# Patient Record
Sex: FEMALE | Race: WHITE | NOT HISPANIC OR LATINO | Employment: OTHER | URBAN - METROPOLITAN AREA
[De-identification: names, ages, dates, MRNs, and addresses within clinical notes are randomized per-mention and may not be internally consistent; named-entity substitution may affect disease eponyms.]

---

## 2024-06-18 ENCOUNTER — TELEPHONE (OUTPATIENT)
Dept: FAMILY MEDICINE CLINIC | Facility: CLINIC | Age: 69
End: 2024-06-18
Payer: COMMERCIAL

## 2024-06-18 NOTE — TELEPHONE ENCOUNTER
RELAY:  Patient daughter called ofc requesting we run ins to verify it; Mary Alice in our office has gotten on [facility] Website; Ins Policy is active.  However, we will need a copy of the card @ check in to verify the correct policy and the patient will be responsible for making sure Carmen Odonnellgomery is in the patient's network.

## 2024-06-19 ENCOUNTER — APPOINTMENT (OUTPATIENT)
Dept: CT IMAGING | Facility: HOSPITAL | Age: 69
End: 2024-06-19
Payer: COMMERCIAL

## 2024-06-19 ENCOUNTER — APPOINTMENT (OUTPATIENT)
Dept: GENERAL RADIOLOGY | Facility: HOSPITAL | Age: 69
End: 2024-06-19
Payer: COMMERCIAL

## 2024-06-19 ENCOUNTER — OFFICE VISIT (OUTPATIENT)
Dept: FAMILY MEDICINE CLINIC | Facility: CLINIC | Age: 69
End: 2024-06-19
Payer: COMMERCIAL

## 2024-06-19 ENCOUNTER — HOSPITAL ENCOUNTER (INPATIENT)
Facility: HOSPITAL | Age: 69
LOS: 14 days | Discharge: HOME | End: 2024-07-03
Attending: EMERGENCY MEDICINE | Admitting: STUDENT IN AN ORGANIZED HEALTH CARE EDUCATION/TRAINING PROGRAM
Payer: COMMERCIAL

## 2024-06-19 VITALS
OXYGEN SATURATION: 99 % | BODY MASS INDEX: 55.56 KG/M2 | HEIGHT: 60 IN | HEART RATE: 87 BPM | DIASTOLIC BLOOD PRESSURE: 84 MMHG | WEIGHT: 283 LBS | SYSTOLIC BLOOD PRESSURE: 132 MMHG

## 2024-06-19 DIAGNOSIS — K92.1 BLACK STOOLS: ICD-10-CM

## 2024-06-19 DIAGNOSIS — R06.02 SHORTNESS OF BREATH: ICD-10-CM

## 2024-06-19 DIAGNOSIS — K14.8 TONGUE LESION: ICD-10-CM

## 2024-06-19 DIAGNOSIS — I82.5Y9 CHRONIC DEEP VEIN THROMBOSIS (DVT) OF PROXIMAL VEIN OF LOWER EXTREMITY, UNSPECIFIED LATERALITY: ICD-10-CM

## 2024-06-19 DIAGNOSIS — K29.70 GASTRITIS: ICD-10-CM

## 2024-06-19 DIAGNOSIS — D62 ACUTE BLOOD LOSS ANEMIA: Primary | ICD-10-CM

## 2024-06-19 DIAGNOSIS — N17.9 ACUTE RENAL FAILURE, UNSPECIFIED ACUTE RENAL FAILURE TYPE: ICD-10-CM

## 2024-06-19 DIAGNOSIS — Z76.89 ENCOUNTER TO ESTABLISH CARE: Primary | ICD-10-CM

## 2024-06-19 DIAGNOSIS — E87.5 ACUTE HYPERKALEMIA: ICD-10-CM

## 2024-06-19 DIAGNOSIS — K92.2 ACUTE GI BLEEDING: ICD-10-CM

## 2024-06-19 PROBLEM — I10 PRIMARY HYPERTENSION: Status: ACTIVE | Noted: 2024-06-19

## 2024-06-19 PROBLEM — I83.009 CHRONIC CUTANEOUS VENOUS STASIS ULCER: Status: ACTIVE | Noted: 2024-06-19

## 2024-06-19 PROBLEM — L97.909 CHRONIC CUTANEOUS VENOUS STASIS ULCER: Status: ACTIVE | Noted: 2024-06-19

## 2024-06-19 PROBLEM — N19 RENAL FAILURE: Status: ACTIVE | Noted: 2024-06-19

## 2024-06-19 PROBLEM — I89.0 CHRONIC ACQUIRED LYMPHEDEMA: Status: ACTIVE | Noted: 2024-06-19

## 2024-06-19 PROBLEM — I82.509 CHRONIC DEEP VEIN THROMBOSIS (DVT): Status: ACTIVE | Noted: 2024-06-19

## 2024-06-19 LAB
ABO GROUP BLD: NORMAL
ABO GROUP BLD: NORMAL
ALBUMIN SERPL-MCNC: 3.2 G/DL (ref 3.5–5.2)
ALBUMIN/GLOB SERPL: 0.8 G/DL
ALP SERPL-CCNC: 59 U/L (ref 39–117)
ALT SERPL W P-5'-P-CCNC: 22 U/L (ref 1–33)
ANION GAP SERPL CALCULATED.3IONS-SCNC: 14 MMOL/L (ref 5–15)
AST SERPL-CCNC: 30 U/L (ref 1–32)
BASOPHILS # BLD AUTO: 0.03 10*3/MM3 (ref 0–0.2)
BASOPHILS NFR BLD AUTO: 0.2 % (ref 0–1.5)
BILIRUB SERPL-MCNC: 0.3 MG/DL (ref 0–1.2)
BLD GP AB SCN SERPL QL: NEGATIVE
BUN SERPL-MCNC: 88 MG/DL (ref 8–23)
BUN/CREAT SERPL: 13 (ref 7–25)
CALCIUM SPEC-SCNC: 9.5 MG/DL (ref 8.6–10.5)
CHLORIDE SERPL-SCNC: 103 MMOL/L (ref 98–107)
CO2 SERPL-SCNC: 17 MMOL/L (ref 22–29)
CREAT SERPL-MCNC: 6.77 MG/DL (ref 0.57–1)
DEPRECATED RDW RBC AUTO: 59.2 FL (ref 37–54)
DEVELOPER EXPIRATION DATE: ABNORMAL
DEVELOPER LOT NUMBER: ABNORMAL
EGFRCR SERPLBLD CKD-EPI 2021: 6.2 ML/MIN/1.73
EOSINOPHIL # BLD AUTO: 0.22 10*3/MM3 (ref 0–0.4)
EOSINOPHIL NFR BLD AUTO: 1.8 % (ref 0.3–6.2)
ERYTHROCYTE [DISTWIDTH] IN BLOOD BY AUTOMATED COUNT: 16.5 % (ref 12.3–15.4)
EXPIRATION DATE: ABNORMAL
FECAL OCCULT BLOOD SCREEN, POC: POSITIVE
GLOBULIN UR ELPH-MCNC: 4.1 GM/DL
GLUCOSE BLDC GLUCOMTR-MCNC: 113 MG/DL (ref 70–130)
GLUCOSE BLDC GLUCOMTR-MCNC: 116 MG/DL (ref 70–130)
GLUCOSE BLDC GLUCOMTR-MCNC: 117 MG/DL (ref 70–130)
GLUCOSE BLDC GLUCOMTR-MCNC: 128 MG/DL (ref 70–130)
GLUCOSE BLDC GLUCOMTR-MCNC: 188 MG/DL (ref 70–130)
GLUCOSE SERPL-MCNC: 125 MG/DL (ref 65–99)
HCT VFR BLD AUTO: 18.9 % (ref 34–46.6)
HGB BLD-MCNC: 5.8 G/DL (ref 12–15.9)
HOLD SPECIMEN: NORMAL
IMM GRANULOCYTES # BLD AUTO: 0.12 10*3/MM3 (ref 0–0.05)
IMM GRANULOCYTES NFR BLD AUTO: 1 % (ref 0–0.5)
INR PPP: >10 (ref 0.89–1.12)
LYMPHOCYTES # BLD AUTO: 1.81 10*3/MM3 (ref 0.7–3.1)
LYMPHOCYTES NFR BLD AUTO: 15.1 % (ref 19.6–45.3)
Lab: ABNORMAL
MCH RBC QN AUTO: 30.7 PG (ref 26.6–33)
MCHC RBC AUTO-ENTMCNC: 30.7 G/DL (ref 31.5–35.7)
MCV RBC AUTO: 100 FL (ref 79–97)
MONOCYTES # BLD AUTO: 0.7 10*3/MM3 (ref 0.1–0.9)
MONOCYTES NFR BLD AUTO: 5.8 % (ref 5–12)
NEGATIVE CONTROL: NEGATIVE
NEUTROPHILS NFR BLD AUTO: 76.1 % (ref 42.7–76)
NEUTROPHILS NFR BLD AUTO: 9.14 10*3/MM3 (ref 1.7–7)
NRBC BLD AUTO-RTO: 0 /100 WBC (ref 0–0.2)
NT-PROBNP SERPL-MCNC: 3437 PG/ML (ref 0–900)
PLATELET # BLD AUTO: 232 10*3/MM3 (ref 140–450)
PMV BLD AUTO: 11.3 FL (ref 6–12)
POSITIVE CONTROL: POSITIVE
POTASSIUM SERPL-SCNC: 6 MMOL/L (ref 3.5–5.2)
PROT SERPL-MCNC: 7.3 G/DL (ref 6–8.5)
PROTHROMBIN TIME: 82.1 SECONDS (ref 12.2–14.5)
RBC # BLD AUTO: 1.89 10*6/MM3 (ref 3.77–5.28)
RH BLD: POSITIVE
RH BLD: POSITIVE
SODIUM SERPL-SCNC: 134 MMOL/L (ref 136–145)
T&S EXPIRATION DATE: NORMAL
TROPONIN T SERPL HS-MCNC: 26 NG/L
WBC NRBC COR # BLD AUTO: 12.02 10*3/MM3 (ref 3.4–10.8)
WHOLE BLOOD HOLD COAG: NORMAL
WHOLE BLOOD HOLD SPECIMEN: NORMAL

## 2024-06-19 PROCEDURE — 71045 X-RAY EXAM CHEST 1 VIEW: CPT

## 2024-06-19 PROCEDURE — 86900 BLOOD TYPING SEROLOGIC ABO: CPT

## 2024-06-19 PROCEDURE — 74176 CT ABD & PELVIS W/O CONTRAST: CPT

## 2024-06-19 PROCEDURE — 86923 COMPATIBILITY TEST ELECTRIC: CPT

## 2024-06-19 PROCEDURE — 93005 ELECTROCARDIOGRAM TRACING: CPT

## 2024-06-19 PROCEDURE — 99204 OFFICE O/P NEW MOD 45 MIN: CPT | Performed by: PHYSICIAN ASSISTANT

## 2024-06-19 PROCEDURE — 82270 OCCULT BLOOD FECES: CPT | Performed by: EMERGENCY MEDICINE

## 2024-06-19 PROCEDURE — P9016 RBC LEUKOCYTES REDUCED: HCPCS

## 2024-06-19 PROCEDURE — 99418 PROLNG IP/OBS E/M EA 15 MIN: CPT | Performed by: INTERNAL MEDICINE

## 2024-06-19 PROCEDURE — 36415 COLL VENOUS BLD VENIPUNCTURE: CPT

## 2024-06-19 PROCEDURE — 85610 PROTHROMBIN TIME: CPT | Performed by: NURSE PRACTITIONER

## 2024-06-19 PROCEDURE — 36430 TRANSFUSION BLD/BLD COMPNT: CPT

## 2024-06-19 PROCEDURE — 83880 ASSAY OF NATRIURETIC PEPTIDE: CPT | Performed by: EMERGENCY MEDICINE

## 2024-06-19 PROCEDURE — 86901 BLOOD TYPING SEROLOGIC RH(D): CPT | Performed by: EMERGENCY MEDICINE

## 2024-06-19 PROCEDURE — 86900 BLOOD TYPING SEROLOGIC ABO: CPT | Performed by: EMERGENCY MEDICINE

## 2024-06-19 PROCEDURE — 86901 BLOOD TYPING SEROLOGIC RH(D): CPT

## 2024-06-19 PROCEDURE — 99291 CRITICAL CARE FIRST HOUR: CPT

## 2024-06-19 PROCEDURE — 93005 ELECTROCARDIOGRAM TRACING: CPT | Performed by: EMERGENCY MEDICINE

## 2024-06-19 PROCEDURE — 99223 1ST HOSP IP/OBS HIGH 75: CPT | Performed by: INTERNAL MEDICINE

## 2024-06-19 PROCEDURE — 85025 COMPLETE CBC W/AUTO DIFF WBC: CPT | Performed by: EMERGENCY MEDICINE

## 2024-06-19 PROCEDURE — 25010000002 CALCIUM GLUCONATE-NACL 1-0.675 GM/50ML-% SOLUTION: Performed by: EMERGENCY MEDICINE

## 2024-06-19 PROCEDURE — 83036 HEMOGLOBIN GLYCOSYLATED A1C: CPT | Performed by: NURSE PRACTITIONER

## 2024-06-19 PROCEDURE — 84484 ASSAY OF TROPONIN QUANT: CPT | Performed by: EMERGENCY MEDICINE

## 2024-06-19 PROCEDURE — 82948 REAGENT STRIP/BLOOD GLUCOSE: CPT

## 2024-06-19 PROCEDURE — 0 DEXTROSE 5 % SOLUTION 1,000 ML FLEX CONT: Performed by: EMERGENCY MEDICINE

## 2024-06-19 PROCEDURE — 63710000001 INSULIN REGULAR HUMAN PER 5 UNITS: Performed by: EMERGENCY MEDICINE

## 2024-06-19 PROCEDURE — 86850 RBC ANTIBODY SCREEN: CPT | Performed by: EMERGENCY MEDICINE

## 2024-06-19 PROCEDURE — 80053 COMPREHEN METABOLIC PANEL: CPT | Performed by: EMERGENCY MEDICINE

## 2024-06-19 RX ORDER — SODIUM CHLORIDE 9 MG/ML
40 INJECTION, SOLUTION INTRAVENOUS AS NEEDED
Status: DISCONTINUED | OUTPATIENT
Start: 2024-06-19 | End: 2024-07-03 | Stop reason: HOSPADM

## 2024-06-19 RX ORDER — BISACODYL 5 MG/1
5 TABLET, DELAYED RELEASE ORAL DAILY PRN
Status: DISCONTINUED | OUTPATIENT
Start: 2024-06-19 | End: 2024-07-03 | Stop reason: HOSPADM

## 2024-06-19 RX ORDER — PANTOPRAZOLE SODIUM 40 MG/10ML
40 INJECTION, POWDER, LYOPHILIZED, FOR SOLUTION INTRAVENOUS
Status: DISCONTINUED | OUTPATIENT
Start: 2024-06-20 | End: 2024-06-20

## 2024-06-19 RX ORDER — LAMOTRIGINE 25 MG/1
500 TABLET ORAL
COMMUNITY

## 2024-06-19 RX ORDER — ROSUVASTATIN CALCIUM 10 MG/1
10 TABLET, COATED ORAL NIGHTLY
COMMUNITY

## 2024-06-19 RX ORDER — TERAZOSIN 2 MG/1
2 CAPSULE ORAL NIGHTLY
Status: DISCONTINUED | OUTPATIENT
Start: 2024-06-19 | End: 2024-07-03 | Stop reason: HOSPADM

## 2024-06-19 RX ORDER — SODIUM CHLORIDE 0.9 % (FLUSH) 0.9 %
10 SYRINGE (ML) INJECTION AS NEEDED
Status: DISCONTINUED | OUTPATIENT
Start: 2024-06-19 | End: 2024-07-03 | Stop reason: HOSPADM

## 2024-06-19 RX ORDER — PANTOPRAZOLE SODIUM 40 MG/10ML
80 INJECTION, POWDER, LYOPHILIZED, FOR SOLUTION INTRAVENOUS ONCE
Status: COMPLETED | OUTPATIENT
Start: 2024-06-19 | End: 2024-06-19

## 2024-06-19 RX ORDER — NEBIVOLOL 2.5 MG/1
2.5 TABLET ORAL
Status: DISCONTINUED | OUTPATIENT
Start: 2024-06-20 | End: 2024-07-03 | Stop reason: HOSPADM

## 2024-06-19 RX ORDER — HEPARIN SODIUM 5000 [USP'U]/ML
5000 INJECTION, SOLUTION INTRAVENOUS; SUBCUTANEOUS EVERY 12 HOURS SCHEDULED
Status: DISCONTINUED | OUTPATIENT
Start: 2024-06-19 | End: 2024-06-19

## 2024-06-19 RX ORDER — SODIUM CHLORIDE 0.9 % (FLUSH) 0.9 %
10 SYRINGE (ML) INJECTION EVERY 12 HOURS SCHEDULED
Status: DISCONTINUED | OUTPATIENT
Start: 2024-06-19 | End: 2024-07-03 | Stop reason: HOSPADM

## 2024-06-19 RX ORDER — DABIGATRAN ETEXILATE 150 MG/1
150 CAPSULE ORAL 2 TIMES DAILY
Status: ON HOLD | COMMUNITY

## 2024-06-19 RX ORDER — DEXTROSE MONOHYDRATE 50 MG/ML
100 INJECTION, SOLUTION INTRAVENOUS CONTINUOUS
Status: DISCONTINUED | OUTPATIENT
Start: 2024-06-19 | End: 2024-06-19 | Stop reason: CLARIF

## 2024-06-19 RX ORDER — AMLODIPINE BESYLATE 2.5 MG/1
2.5 TABLET ORAL
Status: DISCONTINUED | OUTPATIENT
Start: 2024-06-20 | End: 2024-06-22

## 2024-06-19 RX ORDER — POLYETHYLENE GLYCOL 3350 17 G/17G
17 POWDER, FOR SOLUTION ORAL DAILY PRN
Status: DISCONTINUED | OUTPATIENT
Start: 2024-06-19 | End: 2024-07-03 | Stop reason: HOSPADM

## 2024-06-19 RX ORDER — AMOXICILLIN 250 MG
2 CAPSULE ORAL 2 TIMES DAILY PRN
Status: DISCONTINUED | OUTPATIENT
Start: 2024-06-19 | End: 2024-07-03 | Stop reason: HOSPADM

## 2024-06-19 RX ORDER — BISACODYL 10 MG
10 SUPPOSITORY, RECTAL RECTAL DAILY PRN
Status: DISCONTINUED | OUTPATIENT
Start: 2024-06-19 | End: 2024-07-03 | Stop reason: HOSPADM

## 2024-06-19 RX ORDER — CALCIUM GLUCONATE 20 MG/ML
1000 INJECTION, SOLUTION INTRAVENOUS ONCE
Status: COMPLETED | OUTPATIENT
Start: 2024-06-19 | End: 2024-06-19

## 2024-06-19 RX ORDER — DEXTROSE MONOHYDRATE 25 G/50ML
25 INJECTION, SOLUTION INTRAVENOUS ONCE
Status: COMPLETED | OUTPATIENT
Start: 2024-06-19 | End: 2024-06-19

## 2024-06-19 RX ADMIN — Medication 10 ML: at 21:34

## 2024-06-19 RX ADMIN — SODIUM BICARBONATE 150 MEQ: 84 INJECTION, SOLUTION INTRAVENOUS at 16:30

## 2024-06-19 RX ADMIN — PANTOPRAZOLE SODIUM 80 MG: 40 INJECTION, POWDER, FOR SOLUTION INTRAVENOUS at 16:03

## 2024-06-19 RX ADMIN — SODIUM BICARBONATE 50 MEQ: 84 INJECTION INTRAVENOUS at 16:03

## 2024-06-19 RX ADMIN — CALCIUM GLUCONATE 1000 MG: 20 INJECTION, SOLUTION INTRAVENOUS at 16:03

## 2024-06-19 RX ADMIN — INSULIN HUMAN 5 UNITS: 100 INJECTION, SOLUTION PARENTERAL at 15:47

## 2024-06-19 RX ADMIN — TERAZOSIN HYDROCHLORIDE 2 MG: 2 CAPSULE ORAL at 21:34

## 2024-06-19 RX ADMIN — SODIUM ZIRCONIUM CYCLOSILICATE 10 G: 10 POWDER, FOR SUSPENSION ORAL at 15:47

## 2024-06-19 RX ADMIN — DEXTROSE MONOHYDRATE 25 G: 25 INJECTION, SOLUTION INTRAVENOUS at 15:47

## 2024-06-19 NOTE — ED NOTES
Moon Lerma    Nursing Report ED to Floor:  Mental status: a&ox4  Ambulatory status: non ambulatory at this time  Oxygen Therapy:  room air   Cardiac Rhythm: NSR  Admitted from: home/ed  Safety Concerns:  fall risk  Social Issues: non-english speaking; speaks Faroese  ED Room #:  17    ED Nurse Phone Extension - [phone] or may call [phone].      HPI:   Chief Complaint   Patient presents with    Shortness of Breath    Black or Bloody Stool       Past Medical History:  Past Medical History:   Diagnosis Date    Breast cancer     DVT (deep venous thrombosis)     Hypertension         Past Surgical History:  Past Surgical History:   Procedure Laterality Date    MASTECTOMY, PARTIAL      left breast        Admitting Doctor:   James Zarate DO    Consulting Provider(s):  Consults       No orders found from 5/21/2024 to 6/20/2024.             Admitting Diagnosis:   The primary encounter diagnosis was Acute blood loss anemia. Diagnoses of Acute renal failure, unspecified acute renal failure type, Acute hyperkalemia, and Acute GI bleeding were also pertinent to this visit.    Most Recent Vitals:   Vitals:    06/19/24 1511 06/19/24 1530 06/19/24 1544 06/19/24 1559   BP:  145/84 145/84 146/87   BP Location:       Patient Position:       Pulse: 78 78 80 80   Resp:   20 20   Temp:   98.2 °F (36.8 °C) 98 °F (36.7 °C)   TempSrc:       SpO2: 98% 96% 97% 99%   Weight:       Height:           Active LDAs/IV Access:   Lines, Drains & Airways       Active LDAs       Name Placement date Placement time Site Days    Peripheral IV 06/19/24 1427 Left Antecubital 06/19/24  1427  Antecubital  less than 1    Peripheral IV 06/19/24 1540 Left Antecubital 06/19/24  1540  Antecubital  less than 1                    Labs (abnormal labs have a star):   Labs Reviewed   COMPREHENSIVE METABOLIC PANEL - Abnormal; Notable for the following components:       Result Value    Glucose 125 (*)     BUN 88 (*)     Creatinine 6.77 (*)     Sodium 134 (*)      Potassium 6.0 (*)     CO2 17.0 (*)     Albumin 3.2 (*)     eGFR 6.2 (*)     All other components within normal limits    Narrative:     GFR Normal >60  Chronic Kidney Disease <60  Kidney Failure <15     BNP (IN-HOUSE) - Abnormal; Notable for the following components:    proBNP 3,437.0 (*)     All other components within normal limits    Narrative:     This assay is used as an aid in the diagnosis of individuals suspected of having heart failure. It can be used as an aid in the diagnosis of acute decompensated heart failure (ADHF) in patients presenting with signs and symptoms of ADHF to the emergency department (ED). In addition, NT-proBNP of <300 pg/mL indicates ADHF is not likely.    Age Range Result Interpretation  NT-proBNP Concentration (pg/mL:      <50             Positive            >450                   Gray                 300-450                    Negative             <300    50-75           Positive            >900                  Gray                300-900                  Negative            <300      >75             Positive            >1800                  Gray                300-1800                  Negative            <300   SINGLE HS TROPONIN T - Abnormal; Notable for the following components:    HS Troponin T 26 (*)     All other components within normal limits    Narrative:     High Sensitive Troponin T Reference Range:  <14.0 ng/L- Negative Female for AMI  <22.0 ng/L- Negative Male for AMI  >=14 - Abnormal Female indicating possible myocardial injury.  >=22 - Abnormal Male indicating possible myocardial injury.   Clinicians would have to utilize clinical acumen, EKG, Troponin, and serial changes to determine if it is an Acute Myocardial Infarction or myocardial injury due to an underlying chronic condition.        CBC WITH AUTO DIFFERENTIAL - Abnormal; Notable for the following components:    WBC 12.02 (*)     RBC 1.89 (*)     Hemoglobin 5.8 (*)     Hematocrit 18.9 (*)     .0 (*)      MCHC 30.7 (*)     RDW 16.5 (*)     RDW-SD 59.2 (*)     Neutrophil % 76.1 (*)     Lymphocyte % 15.1 (*)     Immature Grans % 1.0 (*)     Neutrophils, Absolute 9.14 (*)     Immature Grans, Absolute 0.12 (*)     All other components within normal limits   POCT OCCULT BLOOD STOOL - Abnormal; Notable for the following components:    Fecal Occult Blood Positive (*)     All other components within normal limits   POCT GLUCOSE FINGERSTICK - Abnormal; Notable for the following components:    Glucose 188 (*)     All other components within normal limits   POCT GLUCOSE FINGERSTICK - Normal   RAINBOW DRAW    Narrative:     The following orders were created for panel order [place] Draw.  Procedure                               Abnormality         Status                     ---------                               -----------         ------                     Green Top (Gel)[[id number]]                                  Final result               Lavender Top[[id number]]                                     Final result               Gold Top - SST[[id number]]                                   Final result               Ackerman Top[[id number]]                                         Final result               Light Blue Top[[id number]]                                   Final result                 Please view results for these tests on the individual orders.   POCT GLUCOSE FINGERSTICK   POCT GLUCOSE FINGERSTICK   POCT GLUCOSE FINGERSTICK   POCT GLUCOSE FINGERSTICK   TYPE AND SCREEN   PREPARE RBC   ABORH 2ND SPECIMEN VERIFICATION   CBC AND DIFFERENTIAL    Narrative:     The following orders were created for panel order CBC & Differential.  Procedure                               Abnormality         Status                     ---------                               -----------         ------                     CBC Auto Differential[[id number]]        Abnormal            Final result                 Please view results for these tests on  the individual orders.   GREEN TOP   LAVENDER TOP   GOLD TOP - SST   GRAY TOP   LIGHT BLUE TOP       Meds Given in ED:   Medications   sodium chloride 0.9 % flush 10 mL (has no administration in time range)   calcium gluconate 1000 Mg/50ml 0.675% NaCl IV SOLN (1,000 mg Intravenous New Bag 6/19/24 1603)   sodium bicarbonate 8.4 % 150 mEq in dextrose (D5W) 5 % 1,000 mL infusion (greater than 100 mEq) (has no administration in time range)   insulin regular (humuLIN R,novoLIN R) injection 5 Units (5 Units Intravenous Given 6/19/24 1547)   dextrose (D50W) (25 g/50 mL) IV injection 25 g (25 g Intravenous Given 6/19/24 1547)   sodium zirconium cyclosilicate (LOKELMA) packet 10 g (10 g Oral Given 6/19/24 1547)   pantoprazole (PROTONIX) injection 80 mg (80 mg Intravenous Given 6/19/24 1603)   sodium bicarbonate injection 8.4% 50 mEq (50 mEq Intravenous Given 6/19/24 1603)     sodium bicarbonate 8.4 % 150 mEq in dextrose (D5W) 5 % 1,000 mL infusion (greater than 100 mEq), 150 mEq         Last NIH score:                                                          Dysphagia screening results:        Johnnie Coma Scale:  No data recorded     CIWA:        Restraint Type:            Isolation Status:  No active isolations

## 2024-06-19 NOTE — PROGRESS NOTES
"Chief Complaint   Patient presents with    [facility]    Shortness of Breath       Moon Lerma is a 68 y.o. female who presents to [facility].  Patient is visiting her daughter from [place].  She arrived last Wednesday.  Was on an antibiotic course for 2 weeks prior to flying to [place].  She has known chronic DVT in her leg and is on Pradaxa.  She reports new onset shortness of breath and is concerned for blood clot.  She reports dark tarry stools for the last few days.  No abdominal pain.  Has episodic nasal bleeding, none today.  Daughter is present and is interpreting for patient.    Chief Complaint   Patient presents with    [facility]    Shortness of Breath       Past Medical History:   Diagnosis Date    Breast cancer     DVT (deep venous thrombosis)     Hypertension        Past Surgical History:   Procedure Laterality Date    MASTECTOMY, PARTIAL      left breast       History reviewed. No pertinent family history.    Social History     Socioeconomic History    Marital status:    Tobacco Use    Smoking status: Never    Smokeless tobacco: Never   Vaping Use    Vaping status: Never Used   Substance and Sexual Activity    Alcohol use: Not Currently    Drug use: Never    Sexual activity: Defer       No Known Allergies    ROS    Review of Systems   Constitutional:  Positive for fatigue. Negative for chills and fever.   Respiratory:  Positive for shortness of breath. Negative for cough and wheezing.    Cardiovascular:  Positive for leg swelling. Negative for chest pain and palpitations.   Gastrointestinal:  Negative for abdominal pain.        Dark black stools   Neurological:  Negative for dizziness and headache.       Vitals:    06/19/24 1142   BP: 132/84   BP Location: Left arm   Patient Position: Sitting   Cuff Size: Adult   Pulse: 87   SpO2: 99%   Weight: 128 kg (283 lb)   Height: 152.4 cm (60\")     Body mass index is 55.27 kg/m².        Current Outpatient Medications on File Prior to Visit "   Medication Sig Dispense Refill    ANASTROZOLE PO Take  by mouth.      dabigatran etexilate (PRADAXA) 150 MG capsu Take 1 capsule by mouth 2 (Two) Times a Day.      NON FORMULARY MEBEVERINE      NON FORMULARY PROBOL NT      PRAZOSIN HCL PO Take  by mouth.       No current facility-administered medications on file prior to visit.       No results found for this or any previous visit.    PE  Physical Exam  Vitals reviewed.   Constitutional:       General: She is not in acute distress.     Appearance: Normal appearance. She is well-developed. She is not ill-appearing or diaphoretic.   HENT:      Head: Normocephalic and atraumatic.      Mouth/Throat:     Eyes:      Extraocular Movements: Extraocular movements intact.      Conjunctiva/sclera: Conjunctivae normal.   Cardiovascular:      Rate and Rhythm: Normal rate and regular rhythm.      Heart sounds: Heart sounds are distant.   Pulmonary:      Effort: Pulmonary effort is normal. No respiratory distress.      Breath sounds: Decreased air movement present. Decreased breath sounds present. No wheezing, rhonchi or rales.   Musculoskeletal:         General: Normal range of motion.      Cervical back: Normal range of motion.      Right lower leg: Edema present.      Left lower leg: Edema present.      Comments: Patient has swelling in both legs.  Lymphedema.   Neurological:      General: No focal deficit present.      Mental Status: She is alert.   Psychiatric:         Attention and Perception: She is attentive.         Speech: Speech normal.         Behavior: Behavior is cooperative.         A/P    Diagnoses and all orders for this visit:    1. Encounter to establish care (Primary)    2. Shortness of breath  -     Comprehensive Metabolic Panel; Future  -     XR Chest PA & Lateral; Future    3. Black stools  -     CBC Auto Differential; Future    4. Chronic deep vein thrombosis (DVT) of proximal vein of lower extremity, unspecified laterality  On Pradaxa.    5. Tongue  lesion  Time, orajel.  If symptoms persist ENT.        New patient.  Just arrived in [place] with multiple concerns.  Recent travel a week ago on plane.  Because of the nature of her concerns, I recommend she go to the ER.  With history of DVT, new onset shortness of breath and dark tarry stools, I recommend further work-up in ER to assess situation in a quick manner.  VSS.  Pulse ox is 99%.  Unable to order d-dimer with known DVT.  Already anticoagulated.    Will order chest x-ray to evaluate lungs for possible pneumonia.  Will order CBC and FIT test.  She had Covid test yesterday at home and was negative.  Again, reiterated to patient that she should go to ER.       Plan of care reviewed with patient at the conclusion of today's visit. Education was provided regarding diagnosis, management and any prescribed or recommended OTC medications.  Patient verbalizes understanding of and agreement with management plan.    Dictated Utilizing Dragon Dictation     Please note that portions of this note were completed with a voice recognition program.     Part of this note may be an electronic transcription/translation of spoken language to printed text using the Dragon Dictation System.      No follow-ups on file.     Carmen Singh PA-C

## 2024-06-19 NOTE — ED PROVIDER NOTES
Subjective   History of Present Illness  68-year-old female who presents for evaluation of tarry stool.  The daughter, is the primary historian.  She reports the patient has had 3 to 4 days of tarry appearing stool.  She has a previous history of DVT and pulmonary embolism diagnosed 2017 and therefore takes Pradaxa.  Does not any medication.  No previous history of GI bleed in the past.  She denies any abdominal pain.  Maybe mild fatigue but no significant lightheadedness.  She exhibits normal mentation and appears in no acute respiratory distress and has normal vital signs.  She last took her Pradaxa this morning.  Of note the patient also has an underlying history of breast cancer.  This has been worked up thoroughly in [place].  She is currently in [place] because she is visiting relatives.  She last had a PET scan that did not show any definitive metastasis and lab evaluation in mid April.  Her creatinine in mid April was 1.4.  Her hemoglobin at that given time was 9.8.  She denies fever, body aches, or chills.  No dysuria.  No other acute complaints.      Review of Systems   Constitutional:  Positive for activity change, appetite change and fatigue. Negative for chills and fever.   HENT:  Negative for congestion, ear pain, postnasal drip, sinus pressure and sore throat.    Eyes:  Negative for pain, redness and visual disturbance.   Respiratory:  Negative for cough, chest tightness and shortness of breath.    Cardiovascular:  Negative for chest pain, palpitations and leg swelling.   Gastrointestinal:  Positive for blood in stool. Negative for abdominal pain, anal bleeding, diarrhea, nausea and vomiting.   Endocrine: Negative for polydipsia and polyuria.   Genitourinary:  Negative for difficulty urinating, dysuria, frequency and urgency.   Musculoskeletal:  Negative for arthralgias, back pain and neck pain.   Skin:  Negative for pallor and rash.   Allergic/Immunologic: Negative for environmental allergies and  immunocompromised state.   Neurological:  Negative for dizziness, weakness and headaches.   Hematological:  Negative for adenopathy.   Psychiatric/Behavioral:  Negative for confusion, self-injury and suicidal ideas. The patient is not nervous/anxious.    All other systems reviewed and are negative.      Past Medical History:   Diagnosis Date    Breast cancer     Clotting disorder     DVT (deep venous thrombosis)     Hypertension        No Known Allergies    Past Surgical History:   Procedure Laterality Date    MASTECTOMY, PARTIAL      left breast       Family History   Problem Relation Age of Onset    No Known Problems Mother     Diabetes Father        Social History     Socioeconomic History    Marital status:    Tobacco Use    Smoking status: Never    Smokeless tobacco: Never   Vaping Use    Vaping status: Never Used   Substance and Sexual Activity    Alcohol use: Not Currently    Drug use: Never    Sexual activity: Defer           Objective   Physical Exam  Vitals and nursing note reviewed.   Constitutional:       General: She is not in acute distress.     Appearance: Normal appearance. She is well-developed. She is not toxic-appearing or diaphoretic.   HENT:      Head: Normocephalic and atraumatic.      Right Ear: External ear normal.      Left Ear: External ear normal.      Nose: Nose normal.   Eyes:      General: Lids are normal.      Pupils: Pupils are equal, round, and reactive to light.   Neck:      Trachea: No tracheal deviation.   Cardiovascular:      Rate and Rhythm: Normal rate and regular rhythm.      Pulses: No decreased pulses.      Heart sounds: Normal heart sounds. No murmur heard.     No friction rub. No gallop.   Pulmonary:      Effort: Pulmonary effort is normal. No respiratory distress.      Breath sounds: Normal breath sounds. No decreased breath sounds, wheezing, rhonchi or rales.   Abdominal:      General: Bowel sounds are normal.      Palpations: Abdomen is soft.      Tenderness:  There is no abdominal tenderness. There is no guarding or rebound.      Comments: Abdomen is soft and nontender diffusely.    Identified on guaiac testing of the stool.   Genitourinary:     Rectum: Guaiac result positive.   Musculoskeletal:         General: No deformity. Normal range of motion.      Cervical back: Normal range of motion and neck supple.   Lymphadenopathy:      Cervical: No cervical adenopathy.   Skin:     General: Skin is warm and dry.      Findings: No rash.   Neurological:      Mental Status: She is alert and oriented to person, place, and time.      Cranial Nerves: No cranial nerve deficit.      Sensory: No sensory deficit.   Psychiatric:         Speech: Speech normal.         Behavior: Behavior normal.         Thought Content: Thought content normal.         Judgment: Judgment normal.         Critical Care    Performed by: Emerald Bravo MD  Authorized by: Emerald Bravo MD    Critical care provider statement:     Critical care time (minutes):  45    Critical care time was exclusive of:  Separately billable procedures and treating other patients    Critical care was necessary to treat or prevent imminent or life-threatening deterioration of the following conditions:  Dehydration and renal failure    Critical care was time spent personally by me on the following activities:  Development of treatment plan with patient or surrogate, discussions with consultants, evaluation of patient's response to treatment, examination of patient, obtaining history from patient or surrogate, ordering and performing treatments and interventions, ordering and review of laboratory studies, ordering and review of radiographic studies, pulse oximetry, re-evaluation of patient's condition and review of old charts    I assumed direction of critical care for this patient from another provider in my specialty: no      Care discussed with: admitting provider               ED Course  ED Course as of 06/19/24  1720   Wed Jun 19, 2024   1550 This patient is currently visiting family from [place].  Arrived here in [place] roughly 1 week ago.  She has a known history of breast cancer being managed in [place].  Her most recent PET scan in April did not show definitive metastasis.  She also had labs most recently performed in April in [place] prior to coming here to [facility].  At that given time she had a hemoglobin 9.8 and a creatinine to 1.4.  She began to have melena 3 to 4 days ago.  She however is hemodynamically stable with normal heart rate and normal blood pressure.  Her hemoglobin is 5.8.  Her current creatinine is 6.7 with associated hyperkalemia to 6.0.  These are all acute changes.  She appears well nontoxic and in no acute distress.  CT scan of the abdomen pelvis without contrast is nonrevealing.  She is positive for melena with no active or severe bleeding.  I have ordered type and screen, 2 units of packed red blood cells,and protonix.  The patient does take Pradaxa due to a previous history of DVT and this has been held but not reversed at this given time due to hemodynamic stability.  I discussed the patient with Dr. Mazariegos who will consult with no further recommendations at this time.  I discussed the patient with the nephrologist Dr. Deutsch who recommends D5W with 150 mEq of bicarb at 100 mL/hr with Lokelma and appropriate potassium shifting medications.  I also ordered a bolus of 50 mequivalents of bicarb. [NS]      ED Course User Index  [NS] Emerald Bravo MD                                             Medical Decision Making  Differential diagnosis includes acute GI bleed, anemia, renal insufficiency, electrolyte abnormality, acute infectious process.    Chest x-ray and CT scan of abdomen pelvis without contrast shows no acute abnormalities.    Lab evaluation shows acute blood loss anemia with a hemoglobin of 5.8.  As stated previous comparison from 2 months ago was 9.8.  The patient has had a  type and screen ordered and 2 units of packed red blood cells ordered.    Labs also show a creatinine of 6.77 with a previous creatinine of 1.42 months ago.  There is also associated hyperkalemia to 6.0.    EKG independently interpreted myself shows sinus rhythm without acute ischemic changes.    As stated the Hemoccult stool test is positive.    The patient is hemodynamically stable therefore Pradaxa will not be reversed at this given time.    I discussed the GI bleed Dr. Mazariegos the GI service who is agreeable with IV fluids, Protonix, and packed red blood cells.  No reversal deemed necessary at this time.    I discussed the patient's acute renal sufficiency with the nephrologist, Dr. Deutsch, who recommends D5W with 150 mill equivalents of bicarb at 100 mL/h.  Appropriate hyperkalemic medications correction initaited include Lokelma, insulin, dextrose, and calcium gluconate.  In addition to bicarb and the drip a amp of bolus bicarb was also ordered.    I discussed the patient with the hospitalist, Dr. Zarate, who will consult on the patient to determine status of admission.    Problems Addressed:  Acute blood loss anemia: complicated acute illness or injury with systemic symptoms  Acute GI bleeding: complicated acute illness or injury with systemic symptoms  Acute hyperkalemia: complicated acute illness or injury with systemic symptoms  Acute renal failure, unspecified acute renal failure type: complicated acute illness or injury with systemic symptoms    Amount and/or Complexity of Data Reviewed  Independent Historian: friend     Details: Daughter provides additional history.  External Data Reviewed: labs, radiology, ECG and notes.  Labs: ordered. Decision-making details documented in ED Course.  Radiology: ordered and independent interpretation performed. Decision-making details documented in ED Course.  ECG/medicine tests: ordered and independent interpretation performed. Decision-making details documented in ED  Course.     Details: EKG independently interpreted myself shows sinus rhythm without acute ischemic changes.  Discussion of management or test interpretation with external provider(s): The patient was discussed with Dr. Brunner GI, Dr. Deutsch of nephrology, and Dr. Zarate of the hospitalist service.    Risk  OTC drugs.  Prescription drug management.  Decision regarding hospitalization.        Final diagnoses:   Acute blood loss anemia   Acute renal failure, unspecified acute renal failure type   Acute hyperkalemia   Acute GI bleeding       ED Disposition  ED Disposition       ED Disposition   Decision to Admit    Condition   --    Comment   Level of Care: Telemetry [5]   Diagnosis: Renal failure [562197]   Certification: I Certify That Inpatient Hospital Services Are Medically Necessary For Greater Than 2 Midnights                 No follow-up provider specified.       Medication List      No changes were made to your prescriptions during this visit.            Emerald Bravo MD  06/19/24 9471

## 2024-06-19 NOTE — PROGRESS NOTES
Case discussed with Dr Bravo, Patient is presenting with acute kidney injury in the setting of poor oral intake and severe anemia.    Recs  Agree with shifting agents Insulin, bicarb, Dextrose  Start D5w+150 meq of Nabicarb@ 100cc/hr  2 U PRBC for severe anemia  Recheck BMP in 2 hr   Gonzalez cath  Strict I/O  Full consultation note to follow

## 2024-06-19 NOTE — H&P
"    [facility] Medicine Services  HISTORY AND PHYSICAL    Patient Name: Moon Lerma  : 1955  MRN: [id number]  Primary Care Physician: Carmen Singh PA-C  Date of admission: 2024    Subjective   Subjective     Chief Complaint:  Dark stools     HPI:  Moon Lerma is a 68 y.o. female with PMH of breast cancer s/p right mastectomy , PE/ DVT 2016 on chronic anticoagulation, HTN, and chronic lymphedema presented to ED at request of PCP due to abnormal labs and SOA. Patient is from [facility], and traveled to the  a few weeks ago, now visiting family. Was treated fro chronic lymphedema vs mild RLE cellulitis with 20 days of oral antibiotics in [place] prior to traveling. (Cephalosporin/ Augmentin).  Dtr states RLE will weep clear fluid and legs are painful.   Has been having increasing SOA with ambulation over the last week since arriving to visit. Daughter helps with history and medical records. [facility] patient went to establish care with new PCP today with current complaints and was encouraged to go to ED for further evaluation with known SOA, dark tarry stools x 3-4 days. No abdominal pain, no n/v. Has had a decreased oral intake due to \"biting her tongue\" one week ago with left lateral tongue ulceration and significant pain. No f/c, hematochezia or hematemesis. No dizziness or lightheadedness. No chest pain or palpitations. No ASA, NSAID, or ETOH use. Does chronically take Pradaxa since  for chronic DVT. No previous history of GIB or ulcerations. Never had an endoscopy.   In ED with labs with sodium 134, potassium 6.0, bicarb 17, creatinine 6.77, WBC 12, hemoglobin 5.8/ hematocrit 18.9. Positive fecal occult. CT a/p negative for any acute findings. Blood pressures and heart rate stable. Will admit to hospital medicine for further evaluation and treatment.         Review of Systems   Constitutional:  Positive for activity change and appetite change. Negative for " fever.   HENT:  Positive for mouth sores. Negative for trouble swallowing and voice change.    Eyes:  Negative for photophobia and visual disturbance.   Respiratory:  Positive for shortness of breath. Negative for cough and chest tightness.    Cardiovascular:  Positive for leg swelling. Negative for chest pain and palpitations.   Gastrointestinal:  Positive for blood in stool. Negative for abdominal distention, abdominal pain, nausea and vomiting.   Endocrine: Negative for cold intolerance and heat intolerance.   Genitourinary:  Negative for difficulty urinating, dysuria, flank pain and hematuria.   Musculoskeletal:  Negative for arthralgias, back pain, gait problem and joint swelling.   Skin:  Positive for wound. Negative for color change, pallor and rash.   Neurological:  Negative for dizziness, syncope, facial asymmetry, speech difficulty, weakness, light-headedness and numbness.   Hematological:  Negative for adenopathy. Does not bruise/bleed easily.   Psychiatric/Behavioral:  Negative for agitation, behavioral problems and confusion. The patient is not nervous/anxious.           Personal History     Past Medical History:   Diagnosis Date    Breast cancer     Clotting disorder     DVT (deep venous thrombosis)     Hypertension              Past Surgical History:   Procedure Laterality Date    MASTECTOMY, PARTIAL      left breast       Family History:  family history includes Diabetes in her father; No Known Problems in her mother.     Social History:  reports that she has never smoked. She has never used smokeless tobacco. She reports that she does not currently use alcohol. She reports that she does not use drugs.  Social History     Social History Narrative    Just arrived in the [place] from [place] to visit daughter        Medications:  Anastrozole, Calcium Carbonate, Dabigatran Etexilate Mesylate, NON FORMULARY, Prazosin HCl, and dabigatran etexilate    No Known Allergies    Objective   Objective      Vital Signs:   Temp:  [97.6 °F (36.4 °C)-98.2 °F (36.8 °C)] 98 °F (36.7 °C)  Heart Rate:  [77-93] 80  Resp:  [18-20] 20  BP: (106-146)/(74-88) 146/87    Physical Exam   Constitutional: Awake, alert  Eyes: PERRLA, sclerae anicteric, no conjunctival injection  HENT: NCAT, mucous membranes moist  Neck: Supple, no thyromegaly, no lymphadenopathy, trachea midline  Respiratory: Clear to auscultation bilaterally, nonlabored respirations   Cardiovascular: RRR, no murmurs, rubs, or gallops, palpable pedal pulses bilaterally  Gastrointestinal: Positive bowel sounds, soft, nontender, nondistended, obese  Musculoskeletal: No bilateral ankle edema (removed ace wrap from RLE) , no clubbing or cyanosis to extremities  Psychiatric: Appropriate affect, cooperative  Neurologic: Oriented x 3, strength symmetric in all extremities, Cranial Nerves grossly intact to confrontation, speech clear  Skin: No rashes, multiple venous ulcerations on bilateral feet/ toes with chronic venous stasis changes to BLE     Result Review:  I have personally reviewed the results from the time of this admission to 6/19/2024 17:10 EDT and agree with these findings:  [x]  Laboratory list / accordion  []  Microbiology  [x]  Radiology  [x]  EKG/Telemetry   []  Cardiology/Vascular   []  Pathology  []  Old records  []  Other:  Most notable findings include:     LAB RESULTS:      Lab 06/19/24  1328   WBC 12.02*   HEMOGLOBIN 5.8*   HEMATOCRIT 18.9*   PLATELETS 232   NEUTROS ABS 9.14*   IMMATURE GRANS (ABS) 0.12*   LYMPHS ABS 1.81   MONOS ABS 0.70   EOS ABS 0.22   .0*         Lab 06/19/24  1328   SODIUM 134*   POTASSIUM 6.0*   CHLORIDE 103   CO2 17.0*   ANION GAP 14.0   BUN 88*   CREATININE 6.77*   EGFR 6.2*   GLUCOSE 125*   CALCIUM 9.5         Lab 06/19/24  1328   TOTAL PROTEIN 7.3   ALBUMIN 3.2*   GLOBULIN 4.1   ALT (SGPT) 22   AST (SGOT) 30   BILIRUBIN 0.3   ALK PHOS 59         Lab 06/19/24  1328   PROBNP 3,437.0*   HSTROP T 26*             Lab  06/19/24  1422   ABO TYPING O   RH TYPING Positive   ANTIBODY SCREEN Negative         Brief Urine Lab Results       None          Microbiology Results (last 10 days)       ** No results found for the last 240 hours. **            CT Abdomen Pelvis Without Contrast    Result Date: 6/19/2024  CT ABDOMEN PELVIS WO CONTRAST Date of Exam: 6/19/2024 2:45 PM EDT Indication: gi bleed. Comparison: None available. Technique: Axial CT images were obtained of the abdomen and pelvis without the administration of contrast. Reconstructed coronal and sagittal images were also obtained. Automated exposure control and iterative construction methods were used. Findings: Evaluation for GI bleeding is significantly limited in the absence of IV contrast. Accounting for this there is no noncontrast CT evidence of obvious GI hemorrhage. There are subpleural reticulations at the left lung base, likely chronic interstitial change. Unremarkable appearance of the liver, gallbladder, bile ducts, spleen, pancreas, adrenal glands, kidneys, ureters, bladder, uterus, and adnexa. No bowel obstruction or inflammatory change of the GI tract. Normal appendix. There is some fluid in the stomach, nonspecific. No abdominopelvic free fluid or fat stranding. No pneumoperitoneum. Unremarkable noncontrast appearance of the vasculature. There are shotty bilateral inguinal lymph nodes and shotty and mildly enlarged bilateral iliac chain lymph nodes, for example a left external iliac chain lymph node measuring 1.4 cm in short axis (series 2 image 105). No enlarged or conspicuous retroperitoneal nodes. Unremarkable appearance of the body wall soft tissues. No acute or suspicious bony findings. There is rightward curvature of the thoracolumbar spine.     Impression: Impression: No acute abdominopelvic findings. No obvious evidence of active GI bleeding noting that assessment is significantly limited in the absence of IV contrast. Shotty and mildly enlarged  bilateral iliac chain lymph nodes, and shotty inguinal lymph nodes, nonspecific. No bulky adenopathy. Electronically Signed: Joshua Meeks MD  6/19/2024 2:59 PM EDT  Workstation ID: [id number]    XR Chest 1 View    Result Date: 6/19/2024  XR CHEST 1 VW Date of Exam: 6/19/2024 1:43 PM EDT Indication: SOA triage protocol Comparison: None available. Findings: Exam is limited by obesity and low lung volumes. The left lower lobe is poorly penetrated. The right lung appears clear. Heart and pulmonary vessels appear within normal limits for technique. There are no definite effusions.     Impression: Impression: Suboptimal exam. No obvious acute abnormality. Electronically Signed: Piper Saldana MD  6/19/2024 2:46 PM EDT  Workstation ID: [id number]         Assessment & Plan   Assessment & Plan       Renal failure    Acute blood loss anemia    Hyperkalemia    Acute upper GI bleeding    Chronic deep vein thrombosis (DVT)    Primary hypertension    Chronic acquired lymphedema    Chronic cutaneous venous stasis ulcer    69 y/o female on chronic pradaxa for DVT, reported Cr of 1.4 in April, recent prolonged abx for LE edema and weeping; she presents w/ a Cr of 6.77 and K of 6.0, INR is >10, Hgb of 5.8, and melena; suspect high serum levels of pradaxa from renal failure precipitating GIB. Getting fluids w/ bicarb per nephro recs, transfusing blood, planned for GI and nephro eval in the morning. Adding urine studies for further eval of renal insufficiency. *Of note, family is concerned about patient's lack of insurance coverage while in the [place], they would like her stabilized enough to make the trip back to [facility] and complete the rest of her care there, as much as is reasonable    Acute blood loss anemia  Acute upper GIB  --H/H of 5.8/ 18.9 in ED, hemoglobin in April of 9.8 per records   --transfuse 2 units PRBC now  --PT/INR pending   --GI consultation   --clear liquids tonight, NPO after MN   --holding Pradaxa  --PPI      Acute  renal failure  Hyperkalemia  Metabolic acidosis   --creatinine in April was 1.4  --creatinine in ED of 6.77 likely from decreased oral intake and anemia  --Nephrology consultation   --Potassium of 6.0, treated with Insulin/ Bicarb/ Dextrose   --started on Bicarb drip     Chronic LLE DVT  --holding Pradaxa for above  --discussed with Pharmacy, doesn't need DVT ppx at this time due to long half life of Pradaxa and severe renal disease, will keep on mechanical ppx for now       HTN  --cont Nebivolol and Amlodipine daily   (Home medication is a combo pill, family states takes BID, I will start at once daily and monitor )  --consider stopping Amlodipine for BLE edema   --cont Prazosin nightly     Chronic venous stasis ulcer  Chronic lymphedema  --PT wound for compression wraps and toe ulcerations     Oral ulceration   --magic mouthwash     History of breast cancer 2020      DVT prophylaxis:  Mechanical     CODE STATUS:    Level Of Support Discussed With: Patient  Code Status (Patient has no pulse and is not breathing): CPR (Attempt to Resuscitate)  Medical Interventions (Patient has pulse or is breathing): Full Support      Expected Discharge  Expected discharge date/ time has not been documented.    APC Time Spent: 60 minutes  This note has been completed as part of a split-shared workflow.     Signature: Electronically signed by VINITA Dodge, 06/19/24, 6:04 PM EDT        Attending   Admission Attestation       I have performed an independent face-to-face diagnostic evaluation including performing an independent physical examination.  I approve of the documented plan of care above that was reviewed and developed with the advanced practice clinician (APC) and take responsibility for that plan along with its associated risks.  I have updated the HPI as appropriate.    Brief HPI    This is a 69 y/o female from [place] here visiting family, w/ Hx of breast cancer s/p mastectomy on chronic hormonal therapy, chronic  pradaxa use for dvt 2016, HTN; she has recently been on 20 days of oral abx for fluid weeping from her lower extremities. She established with a new PCP this AM and reported new dark/tarry stools, new shortness of breath. In the ED she was noted to have a Cr of 6.77 w/ K of 6.0; family reports her Cr was 1.4 in April 2024.    Attending Physical Exam:  Temp:  [97.6 °F (36.4 °C)-98.3 °F (36.8 °C)] 98.3 °F (36.8 °C)  Heart Rate:  [70-93] 74  Resp:  [16-20] 16  BP: (106-152)/() 128/78    Constitutional: Awake, alert, laying in bed in NAD  Respiratory: Clear to auscultation bilaterally, respiratory effort normal   Cardiovascular: RRR, palpable radial pulse  Gastrointestinal: Positive bowel sounds, soft, nontender, nondistended  Musculoskeletal: BL LE edema  Psychiatric: Appropriate affect, cooperative  Neurologic: Speech clear    Result Review:  I have personally reviewed the results from the time of this admission to 6/20/2024 00:25 EDT and agree with these findings:  [x]  Laboratory list / accordion  []  Microbiology  []  Radiology  []  EKG/Telemetry   []  Cardiology/Vascular   []  Pathology  []  Old records  []  Other:  Most notable findings include: renal failure    Assessment and Plan:    See assessment and plan documented by APC above and updated/edited by me as appropriate.    Total time spent: 58  Time spent includes time reviewing chart, face-to-face time, counseling patient/family/caregiver, ordering medications/tests/procedures, communicating with other health care professionals, documenting clinical information in the electronic health record, and coordination of care.     James Zarate,   06/20/24

## 2024-06-20 ENCOUNTER — TELEPHONE (OUTPATIENT)
Dept: FAMILY MEDICINE CLINIC | Facility: CLINIC | Age: 69
End: 2024-06-20
Payer: COMMERCIAL

## 2024-06-20 ENCOUNTER — APPOINTMENT (OUTPATIENT)
Dept: ULTRASOUND IMAGING | Facility: HOSPITAL | Age: 69
End: 2024-06-20
Payer: COMMERCIAL

## 2024-06-20 LAB
ALBUMIN UR-MCNC: 1.9 MG/DL
ANION GAP SERPL CALCULATED.3IONS-SCNC: 13 MMOL/L (ref 5–15)
ANION GAP SERPL CALCULATED.3IONS-SCNC: 14 MMOL/L (ref 5–15)
BACTERIA UR QL AUTO: ABNORMAL /HPF
BASOPHILS # BLD AUTO: 0.02 10*3/MM3 (ref 0–0.2)
BASOPHILS NFR BLD AUTO: 0.2 % (ref 0–1.5)
BH BB BLOOD EXPIRATION DATE: NORMAL
BH BB BLOOD EXPIRATION DATE: NORMAL
BH BB BLOOD TYPE BARCODE: 5100
BH BB BLOOD TYPE BARCODE: 5100
BH BB DISPENSE STATUS: NORMAL
BH BB DISPENSE STATUS: NORMAL
BH BB PRODUCT CODE: NORMAL
BH BB PRODUCT CODE: NORMAL
BH BB UNIT NUMBER: NORMAL
BH BB UNIT NUMBER: NORMAL
BILIRUB UR QL STRIP: NEGATIVE
BUN SERPL-MCNC: 85 MG/DL (ref 8–23)
BUN SERPL-MCNC: 85 MG/DL (ref 8–23)
BUN/CREAT SERPL: 13.2 (ref 7–25)
BUN/CREAT SERPL: 13.4 (ref 7–25)
C3 SERPL-MCNC: 163 MG/DL (ref 82–167)
C4 SERPL-MCNC: 28 MG/DL (ref 14–44)
CALCIUM SPEC-SCNC: 9 MG/DL (ref 8.6–10.5)
CALCIUM SPEC-SCNC: 9.1 MG/DL (ref 8.6–10.5)
CHLORIDE SERPL-SCNC: 105 MMOL/L (ref 98–107)
CHLORIDE SERPL-SCNC: 96 MMOL/L (ref 98–107)
CLARITY UR: ABNORMAL
CO2 SERPL-SCNC: 19 MMOL/L (ref 22–29)
CO2 SERPL-SCNC: 25 MMOL/L (ref 22–29)
COLOR UR: YELLOW
CREAT SERPL-MCNC: 6.35 MG/DL (ref 0.57–1)
CREAT SERPL-MCNC: 6.43 MG/DL (ref 0.57–1)
CREAT UR-MCNC: 23.5 MG/DL
CROSSMATCH INTERPRETATION: NORMAL
CROSSMATCH INTERPRETATION: NORMAL
DEPRECATED RDW RBC AUTO: 58.7 FL (ref 37–54)
EGFRCR SERPLBLD CKD-EPI 2021: 6.6 ML/MIN/1.73
EGFRCR SERPLBLD CKD-EPI 2021: 6.7 ML/MIN/1.73
EOSINOPHIL # BLD AUTO: 0.22 10*3/MM3 (ref 0–0.4)
EOSINOPHIL NFR BLD AUTO: 2.2 % (ref 0.3–6.2)
EOSINOPHIL SPEC QL MICRO: 0 % EOS/100 CELLS (ref 0–0)
ERYTHROCYTE [DISTWIDTH] IN BLOOD BY AUTOMATED COUNT: 17.9 % (ref 12.3–15.4)
GLUCOSE SERPL-MCNC: 117 MG/DL (ref 65–99)
GLUCOSE SERPL-MCNC: 172 MG/DL (ref 65–99)
GLUCOSE UR STRIP-MCNC: NEGATIVE MG/DL
HBA1C MFR BLD: 5.7 % (ref 4.8–5.6)
HCT VFR BLD AUTO: 24.3 % (ref 34–46.6)
HCT VFR BLD AUTO: 24.6 % (ref 34–46.6)
HGB BLD-MCNC: 8 G/DL (ref 12–15.9)
HGB BLD-MCNC: 8 G/DL (ref 12–15.9)
HGB UR QL STRIP.AUTO: ABNORMAL
HYALINE CASTS UR QL AUTO: ABNORMAL /LPF
IMM GRANULOCYTES # BLD AUTO: 0.07 10*3/MM3 (ref 0–0.05)
IMM GRANULOCYTES NFR BLD AUTO: 0.7 % (ref 0–0.5)
KETONES UR QL STRIP: NEGATIVE
LEUKOCYTE ESTERASE UR QL STRIP.AUTO: ABNORMAL
LYMPHOCYTES # BLD AUTO: 2.02 10*3/MM3 (ref 0.7–3.1)
LYMPHOCYTES NFR BLD AUTO: 20.1 % (ref 19.6–45.3)
MCH RBC QN AUTO: 29.9 PG (ref 26.6–33)
MCHC RBC AUTO-ENTMCNC: 32.5 G/DL (ref 31.5–35.7)
MCV RBC AUTO: 91.8 FL (ref 79–97)
MICROALBUMIN/CREAT UR: 80.9 MG/G (ref 0–29)
MONOCYTES # BLD AUTO: 0.57 10*3/MM3 (ref 0.1–0.9)
MONOCYTES NFR BLD AUTO: 5.7 % (ref 5–12)
NEUTROPHILS NFR BLD AUTO: 7.17 10*3/MM3 (ref 1.7–7)
NEUTROPHILS NFR BLD AUTO: 71.1 % (ref 42.7–76)
NITRITE UR QL STRIP: NEGATIVE
NRBC BLD AUTO-RTO: 0 /100 WBC (ref 0–0.2)
PH UR STRIP.AUTO: 6.5 [PH] (ref 5–8)
PLATELET # BLD AUTO: 209 10*3/MM3 (ref 140–450)
PMV BLD AUTO: 11.3 FL (ref 6–12)
POTASSIUM SERPL-SCNC: 4.9 MMOL/L (ref 3.5–5.2)
POTASSIUM SERPL-SCNC: 5.6 MMOL/L (ref 3.5–5.2)
PROT ?TM UR-MCNC: 17.2 MG/DL
PROT UR QL STRIP: ABNORMAL
RBC # BLD AUTO: 2.68 10*6/MM3 (ref 3.77–5.28)
RBC # UR STRIP: ABNORMAL /HPF
REF LAB TEST METHOD: ABNORMAL
SODIUM SERPL-SCNC: 135 MMOL/L (ref 136–145)
SODIUM SERPL-SCNC: 137 MMOL/L (ref 136–145)
SODIUM UR-SCNC: 75 MMOL/L
SP GR UR STRIP: 1.01 (ref 1–1.03)
SQUAMOUS #/AREA URNS HPF: ABNORMAL /HPF
UNIT  ABO: NORMAL
UNIT  ABO: NORMAL
UNIT  RH: NORMAL
UNIT  RH: NORMAL
UROBILINOGEN UR QL STRIP: ABNORMAL
WBC # UR STRIP: ABNORMAL /HPF
WBC NRBC COR # BLD AUTO: 10.07 10*3/MM3 (ref 3.4–10.8)

## 2024-06-20 PROCEDURE — 85014 HEMATOCRIT: CPT | Performed by: STUDENT IN AN ORGANIZED HEALTH CARE EDUCATION/TRAINING PROGRAM

## 2024-06-20 PROCEDURE — 99232 SBSQ HOSP IP/OBS MODERATE 35: CPT | Performed by: STUDENT IN AN ORGANIZED HEALTH CARE EDUCATION/TRAINING PROGRAM

## 2024-06-20 PROCEDURE — 97164 PT RE-EVAL EST PLAN CARE: CPT

## 2024-06-20 PROCEDURE — 82043 UR ALBUMIN QUANTITATIVE: CPT | Performed by: INTERNAL MEDICINE

## 2024-06-20 PROCEDURE — 86160 COMPLEMENT ANTIGEN: CPT | Performed by: INTERNAL MEDICINE

## 2024-06-20 PROCEDURE — 25810000003 SODIUM CHLORIDE 0.9 % SOLUTION: Performed by: INTERNAL MEDICINE

## 2024-06-20 PROCEDURE — 76775 US EXAM ABDO BACK WALL LIM: CPT

## 2024-06-20 PROCEDURE — 85025 COMPLETE CBC W/AUTO DIFF WBC: CPT | Performed by: NURSE PRACTITIONER

## 2024-06-20 PROCEDURE — 85018 HEMOGLOBIN: CPT | Performed by: STUDENT IN AN ORGANIZED HEALTH CARE EDUCATION/TRAINING PROGRAM

## 2024-06-20 PROCEDURE — 97116 GAIT TRAINING THERAPY: CPT | Performed by: PHYSICAL THERAPIST

## 2024-06-20 PROCEDURE — 84156 ASSAY OF PROTEIN URINE: CPT | Performed by: INTERNAL MEDICINE

## 2024-06-20 PROCEDURE — 81001 URINALYSIS AUTO W/SCOPE: CPT | Performed by: INTERNAL MEDICINE

## 2024-06-20 PROCEDURE — 29581 APPL MULTLAYER CMPRN SYS LEG: CPT

## 2024-06-20 PROCEDURE — 97166 OT EVAL MOD COMPLEX 45 MIN: CPT

## 2024-06-20 PROCEDURE — 82570 ASSAY OF URINE CREATININE: CPT | Performed by: INTERNAL MEDICINE

## 2024-06-20 PROCEDURE — 80048 BASIC METABOLIC PNL TOTAL CA: CPT | Performed by: STUDENT IN AN ORGANIZED HEALTH CARE EDUCATION/TRAINING PROGRAM

## 2024-06-20 PROCEDURE — 0 DEXTROSE 5 % SOLUTION 1,000 ML FLEX CONT: Performed by: EMERGENCY MEDICINE

## 2024-06-20 PROCEDURE — 97535 SELF CARE MNGMENT TRAINING: CPT

## 2024-06-20 PROCEDURE — 84300 ASSAY OF URINE SODIUM: CPT | Performed by: INTERNAL MEDICINE

## 2024-06-20 PROCEDURE — 80048 BASIC METABOLIC PNL TOTAL CA: CPT | Performed by: INTERNAL MEDICINE

## 2024-06-20 PROCEDURE — 87205 SMEAR GRAM STAIN: CPT | Performed by: INTERNAL MEDICINE

## 2024-06-20 PROCEDURE — 99222 1ST HOSP IP/OBS MODERATE 55: CPT | Performed by: PHYSICIAN ASSISTANT

## 2024-06-20 PROCEDURE — 97161 PT EVAL LOW COMPLEX 20 MIN: CPT | Performed by: PHYSICAL THERAPIST

## 2024-06-20 RX ORDER — PANTOPRAZOLE SODIUM 40 MG/10ML
40 INJECTION, POWDER, LYOPHILIZED, FOR SOLUTION INTRAVENOUS
Status: DISCONTINUED | OUTPATIENT
Start: 2024-06-20 | End: 2024-06-28

## 2024-06-20 RX ORDER — SODIUM CHLORIDE 9 MG/ML
75 INJECTION, SOLUTION INTRAVENOUS CONTINUOUS
Status: DISCONTINUED | OUTPATIENT
Start: 2024-06-20 | End: 2024-06-23

## 2024-06-20 RX ORDER — ACETAMINOPHEN 325 MG/1
650 TABLET ORAL EVERY 6 HOURS PRN
Status: DISCONTINUED | OUTPATIENT
Start: 2024-06-20 | End: 2024-07-03 | Stop reason: HOSPADM

## 2024-06-20 RX ADMIN — TERAZOSIN HYDROCHLORIDE 2 MG: 2 CAPSULE ORAL at 21:03

## 2024-06-20 RX ADMIN — SODIUM ZIRCONIUM CYCLOSILICATE 10 G: 10 POWDER, FOR SUSPENSION ORAL at 21:03

## 2024-06-20 RX ADMIN — SODIUM ZIRCONIUM CYCLOSILICATE 10 G: 10 POWDER, FOR SUSPENSION ORAL at 16:37

## 2024-06-20 RX ADMIN — NEBIVOLOL 2.5 MG: 2.5 TABLET ORAL at 08:50

## 2024-06-20 RX ADMIN — ACETAMINOPHEN 650 MG: 325 TABLET, FILM COATED ORAL at 08:50

## 2024-06-20 RX ADMIN — SODIUM ZIRCONIUM CYCLOSILICATE 10 G: 10 POWDER, FOR SUSPENSION ORAL at 08:50

## 2024-06-20 RX ADMIN — AMLODIPINE BESYLATE 2.5 MG: 2.5 TABLET ORAL at 08:50

## 2024-06-20 RX ADMIN — PANTOPRAZOLE SODIUM 40 MG: 40 INJECTION, POWDER, FOR SOLUTION INTRAVENOUS at 05:18

## 2024-06-20 RX ADMIN — SODIUM CHLORIDE 100 ML/HR: 9 INJECTION, SOLUTION INTRAVENOUS at 14:39

## 2024-06-20 RX ADMIN — Medication 10 ML: at 08:51

## 2024-06-20 RX ADMIN — SODIUM BICARBONATE 150 MEQ: 84 INJECTION, SOLUTION INTRAVENOUS at 02:39

## 2024-06-20 RX ADMIN — PANTOPRAZOLE SODIUM 40 MG: 40 INJECTION, POWDER, FOR SOLUTION INTRAVENOUS at 16:37

## 2024-06-20 RX ADMIN — Medication 10 ML: at 21:02

## 2024-06-20 NOTE — CONSULTS
[facility]ology Consult    Referring Provider: Mali Carrillo MD     PCP: Carmen Singh PA-C    Reason for Consultation: GI bleed with melena     Chief complaint: Fatigue, dark stools     History of present illness:    Moon Lerma is a 68 y.o. female who is admitted with acute renal failure and gastrointestinal bleeding with melena.   She resides in [facility] and recently traveled to [place] last week to visit her daughter.   She has history of breast cancer, diagnosed in 2020 with subsequent mastectomy, chronic hormonal therapy and lymphedema.   She has been on a 20 day antibiotic course for concerns of lower extremity cellulitis (Cephalosporin/Augmentin).       The patient bit her tongue one week ago, resulting in left lateral tongue ulceration, significant oral pain and bleeding.   She then began to notice dark stools with lightheadedness, fatigue.   She denies any abdominal pain, nausea nor vomiting.       She denies any history of previous GI bleeding.  She denies use of NSAIDs.  She has never had a colonoscopy nor EGD.      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       Labs on arrival showed acute renal failure with Cr of 6.77, hyperkalemia (K+ 6) and metabolic acidosis.   H&H was 5.8 and 18.   INR is > 10 , Protime is significantly prolonged at 82.        She denies any abdominal surgeries.    Her daughter is at the bedside and very helpful in assisting with history above.   They do voice concerns that they wish to return to [facility] as soon as possible for extended care if patient can be stabilized.       Allergies:  Patient has no known allergies.    Scheduled Meds:  amLODIPine, 2.5 mg, Oral, Q24H  nebivolol, 2.5 mg, Oral, Q24H  pantoprazole, 40 mg, Intravenous, Q AM  sodium chloride, 10 mL, Intravenous, Q12H  sodium zirconium cyclosilicate, 10 g, Oral, TID  terazosin, 2 mg, Oral, Nightly         Infusions:  sodium bicarbonate 8.4 % 150 mEq in dextrose (D5W) 5 % 1,000 mL  infusion (greater than 100 mEq), 150 mEq, Last Rate: 150 mEq (06/20/24 3186)        PRN Meds:    acetaminophen    senna-docusate sodium **AND** polyethylene glycol **AND** bisacodyl **AND** bisacodyl    sodium chloride    sodium chloride    sodium chloride    Home Meds:  Medications Prior to Admission   Medication Sig Dispense Refill Last Dose    ANASTROZOLE PO Take  by mouth.       Calcium Carbonate (CALCIUM 500 PO) Take  by mouth.       dabigatran etexilate (PRADAXA) 150 MG capsu Take 1 capsule by mouth 2 (Two) Times a Day.       Dabigatran Etexilate Mesylate (PRADAXA PO) Take 220 mg by mouth Daily.       Fulvestrant (FASLODEX) 250 MG/5ML chemo syringe Inject 10 mL into the appropriate muscle as directed by prescriber Every 30 (Thirty) Days. Last dose 6/6/24       NON FORMULARY MEBEVERINE       NON FORMULARY PROBOL NT       NON FORMULARY Take 5 mg by mouth Daily. amlodipine and nebivolol compound       PRAZOSIN HCL PO Take 2.5 mg by mouth Daily.       rosuvastatin (CRESTOR) 10 MG tablet Take 1 tablet by mouth Every Night.          ROS: Review of Systems   Constitutional:  Positive for fatigue.   HENT:  Positive for mouth sores.    Eyes: Negative.    Respiratory: Negative.     Cardiovascular:  Positive for leg swelling.   Gastrointestinal:  Positive for blood in stool. Negative for abdominal pain, nausea and vomiting.   Endocrine: Negative.    Genitourinary: Negative.    Musculoskeletal: Negative.    Skin:  Positive for wound.   Neurological:  Positive for weakness.   Hematological:  Bruises/bleeds easily.   Psychiatric/Behavioral: Negative.       PAST MED HX:  Past Medical History:   Diagnosis Date    Breast cancer     Clotting disorder     DVT (deep venous thrombosis)     Hypertension      PAST SURG HX:  Past Surgical History:   Procedure Laterality Date    MASTECTOMY, PARTIAL      left breast     FAM HX:  Family History   Problem Relation Age of Onset    No Known Problems Mother     Diabetes Father      SOC  "HX:  Social History     Socioeconomic History    Marital status:    Tobacco Use    Smoking status: Never    Smokeless tobacco: Never   Vaping Use    Vaping status: Never Used   Substance and Sexual Activity    Alcohol use: Not Currently    Drug use: Never    Sexual activity: Defer     PHYSICAL EXAM  /71   Pulse 74   Temp 97.4 °F (36.3 °C) (Oral)   Resp 16   Ht 152.4 cm (60\")   Wt 128 kg (282 lb 3 oz)   SpO2 97%   BMI 55.11 kg/m²   Wt Readings from Last 3 Encounters:   06/19/24 128 kg (282 lb 3 oz)   06/19/24 128 kg (283 lb)   ,body mass index is 55.11 kg/m².  Physical Exam  Constitutional:       General: She is not in acute distress.     Appearance: She is obese.   HENT:      Head: Normocephalic and atraumatic.   Cardiovascular:      Rate and Rhythm: Normal rate and regular rhythm.   Pulmonary:      Effort: Pulmonary effort is normal. No respiratory distress.   Abdominal:      General: Bowel sounds are normal. There is no distension.      Palpations: Abdomen is soft.      Tenderness: There is no abdominal tenderness. There is no guarding.   Skin:     General: Skin is warm and dry.   Neurological:      Mental Status: She is alert and oriented to person, place, and time.         Results Review:   I reviewed the patient's new clinical results.    Lab Results   Component Value Date    WBC 10.07 06/20/2024    HGB 8.0 (L) 06/20/2024    HGB 5.8 (C) 06/19/2024    HCT 24.6 (L) 06/20/2024    MCV 91.8 06/20/2024     06/20/2024     Lab Results   Component Value Date    INR >10.00 (C) 06/19/2024     Lab Results   Component Value Date    GLUCOSE 117 (H) 06/20/2024    BUN 85 (H) 06/20/2024    CREATININE 6.35 (H) 06/20/2024    BCR 13.4 06/20/2024     06/20/2024    K 5.6 (H) 06/20/2024    CO2 19.0 (L) 06/20/2024    CALCIUM 9.1 06/20/2024    ALBUMIN 3.2 (L) 06/19/2024    ALKPHOS 59 06/19/2024    BILITOT 0.3 06/19/2024    ALT 22 06/19/2024    AST 30 06/19/2024     CT Abdomen/Pelvis without " contrast:  Findings:  Evaluation for GI bleeding is significantly limited in the absence of IV contrast. Accounting for this there is no noncontrast CT evidence of obvious GI hemorrhage.   There are subpleural reticulations at the left lung base, likely chronic interstitial change. Unremarkable appearance of the liver, gallbladder, bile ducts, spleen, pancreas, adrenal glands, kidneys, ureters, bladder, uterus, and adnexa. No bowel obstruction or inflammatory change of the GI tract. Normal appendix. There is some fluid in the stomach, nonspecific. No abdominopelvic free fluid or fat stranding. No pneumoperitoneum. Unremarkable noncontrast appearance of the vasculature. There are shotty bilateral inguinal lymph nodes and shotty and mildly enlarged bilateral iliac chain lymph nodes, for example a left external iliac chain lymph node measuring 1.4 cm in short axis (series 2 image 105). No enlarged or conspicuous retroperitoneal nodes. Unremarkable appearance of the body wall soft tissues. No acute or suspicious bony findings. There is rightward curvature of the thoracolumbar spine.   IMPRESSION:  Impression:  No acute abdominopelvic findings. No obvious evidence of active GI bleeding noting that assessment is significantly limited in the absence of IV contrast.   Shotty and mildly enlarged bilateral iliac chain lymph nodes, and shotty inguinal lymph nodes, nonspecific. No bulky adenopathy.    ASSESSMENTS/PLANS    Gastrointestinal bleeding with melena   Acute blood loss anemia   Supra therapeutic INR in the setting of Pradaxa use with renal failure.  INR >10.    Recent oral ulceration with bleeding  Acute renal failure  Hyperkalemia   History of breast cancer, on chronic hormonal therapy.     Differential of melena is broad, would include peptic ulcer disease, reflux esophagitis, angiectasias. Possible swallowed blood from recent mouth bleeding.  Bleeding is certainly exacerbated by her elevated INR/Pradaxa use in the  setting of renal failure.  She has never had a colonoscopy nor EGD.    She responded well to transfusion overnight with improvement in Hgb to 8.0.     >> Recommend medical management at this time with BID Pantoprazole  >> Discussed EGD and Colonoscopy inpatient if melena persists when INR is corrected.   Can consider this to be performed in [place] if patient remains stable on medical management.     >> Serial H&H  >> Pradaxa has been held.       I discussed the patient's findings and my recommendations with patient and her daughter whom is at the bedside.      BHARAT Monet  06/20/24  09:17 EDT

## 2024-06-20 NOTE — NURSING NOTE
Patient is very overbearing and does not want to use a purwick and demands to go to bathroom. When patient was walking to the restroom her heart went into odd rhythm however, it was difficult to analyze. I told her that she needed to get into a hospital gown so we could more readily access her leads. She protested but I explained it was crucial we determine her rhythm.     Patient's daughter constantly wants to know updates and has further questions that writer cannot answer. Patient care is being affected and I'm not certain if it's the daughter or the patient that is displaying extreme anxiety.

## 2024-06-20 NOTE — PROGRESS NOTES
[facility] Medicine Services  PROGRESS NOTE    Patient Name: Moon Lerma  : 1955  MRN: [id number]    Date of Admission: 2024  Primary Care Physician: Carmen Singh PA-C    Subjective   Subjective     CC:  Acute renal failure    HPI:  Walked around with therapy today without any shortness of breath.  No nausea or vomiting.  No bowel movement.  Patient's daughter is at bedside.    Objective   Objective     Vital Signs:   Temp:  [97.4 °F (36.3 °C)-98.3 °F (36.8 °C)] 97.4 °F (36.3 °C)  Heart Rate:  [70-93] 74  Resp:  [16-20] 18  BP: (106-152)/() 119/69     Physical Exam:  Constitutional: No acute distress, awake, alert, obese  HENT: NCAT, mucous membranes moist  Respiratory: Clear to auscultation bilaterally, respiratory effort normal   Cardiovascular: RRR, no murmurs, rubs, or gallops  Gastrointestinal: Positive bowel sounds, soft, nontender, nondistended  Musculoskeletal: No bilateral ankle edema  Psychiatric: Appropriate affect, cooperative  Neurologic: PERRL, symmetric facies, moves all extremities, speech clear  Skin: No rashes    Results Reviewed:  LAB RESULTS:      Lab 24  0035 24  1328   WBC 10.07 12.02*   HEMOGLOBIN 8.0* 5.8*   HEMATOCRIT 24.6* 18.9*   PLATELETS 209 232   NEUTROS ABS 7.17* 9.14*   IMMATURE GRANS (ABS) 0.07* 0.12*   LYMPHS ABS 2.02 1.81   MONOS ABS 0.57 0.70   EOS ABS 0.22 0.22   MCV 91.8 100.0*   PROTIME  --  82.1*         Lab 24  0035 24  2219 24  1328   SODIUM 137  --  134*   POTASSIUM 5.6*  --  6.0*   CHLORIDE 105  --  103   CO2 19.0*  --  17.0*   ANION GAP 13.0  --  14.0   BUN 85*  --  88*   CREATININE 6.35*  --  6.77*   EGFR 6.7*  --  6.2*   GLUCOSE 117*  --  125*   CALCIUM 9.1  --  9.5   HEMOGLOBIN A1C  --  5.70*  --          Lab 24  1328   TOTAL PROTEIN 7.3   ALBUMIN 3.2*   GLOBULIN 4.1   ALT (SGPT) 22   AST (SGOT) 30   BILIRUBIN 0.3   ALK PHOS 59         Lab 24  1328   PROBNP 3,437.0*    HSTROP T 26*   PROTIME 82.1*   INR >10.00*             Lab 06/19/24  1422   ABO TYPING O   RH TYPING Positive   ANTIBODY SCREEN Negative         Brief Urine Lab Results       None            Microbiology Results Abnormal       None            CT Abdomen Pelvis Without Contrast    Result Date: 6/19/2024  CT ABDOMEN PELVIS WO CONTRAST Date of Exam: 6/19/2024 2:45 PM EDT Indication: gi bleed. Comparison: None available. Technique: Axial CT images were obtained of the abdomen and pelvis without the administration of contrast. Reconstructed coronal and sagittal images were also obtained. Automated exposure control and iterative construction methods were used. Findings: Evaluation for GI bleeding is significantly limited in the absence of IV contrast. Accounting for this there is no noncontrast CT evidence of obvious GI hemorrhage. There are subpleural reticulations at the left lung base, likely chronic interstitial change. Unremarkable appearance of the liver, gallbladder, bile ducts, spleen, pancreas, adrenal glands, kidneys, ureters, bladder, uterus, and adnexa. No bowel obstruction or inflammatory change of the GI tract. Normal appendix. There is some fluid in the stomach, nonspecific. No abdominopelvic free fluid or fat stranding. No pneumoperitoneum. Unremarkable noncontrast appearance of the vasculature. There are shotty bilateral inguinal lymph nodes and shotty and mildly enlarged bilateral iliac chain lymph nodes, for example a left external iliac chain lymph node measuring 1.4 cm in short axis (series 2 image 105). No enlarged or conspicuous retroperitoneal nodes. Unremarkable appearance of the body wall soft tissues. No acute or suspicious bony findings. There is rightward curvature of the thoracolumbar spine.     Impression: Impression: No acute abdominopelvic findings. No obvious evidence of active GI bleeding noting that assessment is significantly limited in the absence of IV contrast. Shotty and mildly  enlarged bilateral iliac chain lymph nodes, and shotty inguinal lymph nodes, nonspecific. No bulky adenopathy. Electronically Signed: Joshua Meeks MD  6/19/2024 2:59 PM EDT  Workstation ID: [id number]    XR Chest 1 View    Result Date: 6/19/2024  XR CHEST 1 VW Date of Exam: 6/19/2024 1:43 PM EDT Indication: SOA triage protocol Comparison: None available. Findings: Exam is limited by obesity and low lung volumes. The left lower lobe is poorly penetrated. The right lung appears clear. Heart and pulmonary vessels appear within normal limits for technique. There are no definite effusions.     Impression: Impression: Suboptimal exam. No obvious acute abnormality. Electronically Signed: Piper Saldana MD  6/19/2024 2:46 PM EDT  Workstation ID: [id number]         Current medications:  Scheduled Meds:amLODIPine, 2.5 mg, Oral, Q24H  nebivolol, 2.5 mg, Oral, Q24H  pantoprazole, 40 mg, Intravenous, Q AM  sodium chloride, 10 mL, Intravenous, Q12H  sodium zirconium cyclosilicate, 10 g, Oral, TID  terazosin, 2 mg, Oral, Nightly      Continuous Infusions:sodium bicarbonate 8.4 % 150 mEq in dextrose (D5W) 5 % 1,000 mL infusion (greater than 100 mEq), 150 mEq, Last Rate: 150 mEq (06/20/24 0239)      PRN Meds:.  senna-docusate sodium **AND** polyethylene glycol **AND** bisacodyl **AND** bisacodyl    sodium chloride    sodium chloride    sodium chloride    Assessment & Plan   Assessment & Plan     Active Hospital Problems    Diagnosis  POA    **Renal failure [N19]  Yes    Acute blood loss anemia [D62]  Unknown    Hyperkalemia [E87.5]  Unknown    Acute upper GI bleeding [K92.2]  Unknown    Chronic deep vein thrombosis (DVT) [I82.509]  Unknown    Primary hypertension [I10]  Unknown    Chronic acquired lymphedema [I89.0]  Unknown    Chronic cutaneous venous stasis ulcer [I83.009, L97.909]  Unknown      Resolved Hospital Problems   No resolved problems to display.        Brief Hospital Course to date:  Moon Lerma is a 68 y.o. female  on chronic pradaxa for DVT, reported Cr of 1.4 in April, recent prolonged abx for LE edema and weeping; she presents w/ a Cr of 6.77 and K of 6.0, INR is >10, Hgb of 5.8, and melena; suspect high serum levels of pradaxa from renal failure, which precipitated GIB.      *Of note, family is concerned about patient's lack of insurance coverage while in the [place], they would like her stabilized enough to make the trip back to [facility] and complete the rest of her care there, as much as is reasonable     Acute blood loss anemia  Acute upper GIB  --H/H of 5.8/ 18.9 in ED, hemoglobin in April of 9.8 per records   --s/p 2 units RBC   --GI consulted  -- If melena persists once INR is corrected, then may perform EGD/colonoscopy inpatient.  Can consider this to be performed in [facility] if patient stable with medical management  --holding Pradaxa  --PPI     Acute renal failure  Hyperkalemia  Metabolic acidosis   --creatinine in April was 1.4  --creatinine in ED of 6.77 likely from decreased oral intake and anemia  --Nephrology consulted  --Potassium of 6.0, treated with Insulin/ Bicarb/ Dextrose   --started on Bicarb drip   --continue TID lokelma  --repeat BMP midday     Chronic LLE DVT  --holding Pradaxa for above  --my practice partner discussed with Pharmacy, doesn't need DVT ppx at this time due to long half life of Pradaxa and severe renal disease; no SCDs given DVT     HTN  --cont Nebivolol and Amlodipine daily   --consider stopping Amlodipine for BLE edema   --cont Prazosin nightly      Chronic venous stasis ulcer  Chronic lymphedema  --PT wound for compression wraps and toe ulcerations      Oral ulceration --magic mouthwash   History of breast cancer 2020    Expected Discharge Location and Transportation: home  Expected Discharge   Expected Discharge Date: 6/22/2024; Expected Discharge Time:      VTE Prophylaxis:  Mechanical VTE prophylaxis orders are present.         AM-PAC 6 Clicks Score (PT): 12 (06/19/24 2015)    CODE  STATUS:   Code Status and Medical Interventions:   Ordered at: 06/19/24 7657     Level Of Support Discussed With:    Patient     Code Status (Patient has no pulse and is not breathing):    CPR (Attempt to Resuscitate)     Medical Interventions (Patient has pulse or is breathing):    Full Support       Mali Carrillo MD  06/20/24

## 2024-06-20 NOTE — PLAN OF CARE
Problem: Adult Inpatient Plan of Care  Goal: Plan of Care Review  Outcome: Ongoing, Progressing  Goal: Patient-Specific Goal (Individualized)  Outcome: Ongoing, Progressing  Goal: Absence of Hospital-Acquired Illness or Injury  Outcome: Ongoing, Progressing  Intervention: Identify and Manage Fall Risk  Recent Flowsheet Documentation  Taken 6/20/2024 1600 by Nola Meier RN  Safety Promotion/Fall Prevention:   activity supervised   assistive device/personal items within reach   clutter free environment maintained   toileting scheduled   safety round/check completed   room organization consistent  Taken 6/20/2024 1400 by Nola Meier RN  Safety Promotion/Fall Prevention:   activity supervised   assistive device/personal items within reach   clutter free environment maintained   toileting scheduled   safety round/check completed   room organization consistent  Taken 6/20/2024 1200 by Nola Meier RN  Safety Promotion/Fall Prevention:   activity supervised   assistive device/personal items within reach   clutter free environment maintained   toileting scheduled   safety round/check completed   room organization consistent  Taken 6/20/2024 1000 by Nola Meier RN  Safety Promotion/Fall Prevention:   activity supervised   assistive device/personal items within reach   clutter free environment maintained   toileting scheduled   safety round/check completed   room organization consistent  Taken 6/20/2024 0800 by Nola Meier RN  Safety Promotion/Fall Prevention:   activity supervised   assistive device/personal items within reach   clutter free environment maintained   toileting scheduled   safety round/check completed   room organization consistent  Intervention: Prevent Skin Injury  Recent Flowsheet Documentation  Taken 6/20/2024 1600 by Nola Meier RN  Body Position:   weight shifting   tilted   left  Skin Protection:   adhesive use limited   tubing/devices free from skin contact    transparent dressing maintained  Taken 6/20/2024 1400 by Nola Meier RN  Body Position:   weight shifting   sitting up in bed  Skin Protection:   adhesive use limited   tubing/devices free from skin contact   transparent dressing maintained   skin-to-skin areas padded   skin-to-device areas padded  Taken 6/20/2024 1200 by Nola Meier RN  Body Position:   weight shifting   supine, legs elevated  Skin Protection:   adhesive use limited   tubing/devices free from skin contact   transparent dressing maintained   skin-to-skin areas padded   skin-to-device areas padded  Taken 6/20/2024 1000 by Nola Meier RN  Body Position:   weight shifting   supine, legs elevated  Skin Protection:   adhesive use limited   tubing/devices free from skin contact   transparent dressing maintained   skin-to-skin areas padded   skin-to-device areas padded  Taken 6/20/2024 0800 by Nola Meier RN  Body Position:   weight shifting   sitting up in bed  Skin Protection:   adhesive use limited   tubing/devices free from skin contact   transparent dressing maintained   skin-to-device areas padded   skin-to-skin areas padded  Intervention: Prevent and Manage VTE (Venous Thromboembolism) Risk  Recent Flowsheet Documentation  Taken 6/20/2024 1600 by Nola Meier RN  Activity Management: activity encouraged  Taken 6/20/2024 1400 by Nola Meier RN  Activity Management: activity encouraged  Taken 6/20/2024 1200 by Nola Meier RN  Activity Management: activity encouraged  Taken 6/20/2024 1000 by Nola Meier RN  Activity Management:   activity encouraged   up in chair  Taken 6/20/2024 0800 by Nola Meier RN  Activity Management: activity encouraged  Range of Motion: active ROM (range of motion) encouraged  Goal: Optimal Comfort and Wellbeing  Outcome: Ongoing, Progressing  Intervention: Provide Person-Centered Care  Recent Flowsheet Documentation  Taken 6/20/2024 0800 by Nola Meier RN  Trust  Relationship/Rapport: care explained  Goal: Readiness for Transition of Care  Outcome: Ongoing, Progressing     Problem: Skin Injury Risk Increased  Goal: Skin Health and Integrity  Outcome: Ongoing, Progressing  Intervention: Optimize Skin Protection  Recent Flowsheet Documentation  Taken 6/20/2024 1600 by Nola Meier RN  Pressure Reduction Techniques:   frequent weight shift encouraged   heels elevated off bed   pressure points protected   weight shift assistance provided  Head of Bed (HOB) Positioning: HOB elevated  Pressure Reduction Devices:   specialty bed utilized   positioning supports utilized   foam padding utilized   heel offloading device utilized  Skin Protection:   adhesive use limited   tubing/devices free from skin contact   transparent dressing maintained  Taken 6/20/2024 1400 by Nola Meier RN  Pressure Reduction Techniques:   frequent weight shift encouraged   heels elevated off bed   pressure points protected   weight shift assistance provided  Head of Bed (HOB) Positioning: HOB elevated  Pressure Reduction Devices:   specialty bed utilized   positioning supports utilized   heel offloading device utilized   foam padding utilized  Skin Protection:   adhesive use limited   tubing/devices free from skin contact   transparent dressing maintained   skin-to-skin areas padded   skin-to-device areas padded  Taken 6/20/2024 1200 by Nola Meier RN  Pressure Reduction Techniques:   frequent weight shift encouraged   heels elevated off bed   pressure points protected   weight shift assistance provided  Head of Bed (HOB) Positioning: HOB elevated  Pressure Reduction Devices:   pressure-redistributing mattress utilized   positioning supports utilized   heel offloading device utilized   foam padding utilized  Skin Protection:   adhesive use limited   tubing/devices free from skin contact   transparent dressing maintained   skin-to-skin areas padded   skin-to-device areas padded  Taken  6/20/2024 1000 by Nola Meier RN  Pressure Reduction Techniques:   frequent weight shift encouraged   heels elevated off bed   pressure points protected   weight shift assistance provided  Head of Bed (HOB) Positioning: HOB elevated  Pressure Reduction Devices:   pressure-redistributing mattress utilized   positioning supports utilized   heel offloading device utilized   foam padding utilized   chair cushion utilized  Skin Protection:   adhesive use limited   tubing/devices free from skin contact   transparent dressing maintained   skin-to-skin areas padded   skin-to-device areas padded  Taken 6/20/2024 0800 by Nola Meier RN  Pressure Reduction Techniques:   frequent weight shift encouraged   heels elevated off bed   pressure points protected   weight shift assistance provided  Head of Bed (HOB) Positioning: HOB elevated  Pressure Reduction Devices:   pressure-redistributing mattress utilized   positioning supports utilized   heel offloading device utilized   foam padding utilized  Skin Protection:   adhesive use limited   tubing/devices free from skin contact   transparent dressing maintained   skin-to-device areas padded   skin-to-skin areas padded     Problem: Fall Injury Risk  Goal: Absence of Fall and Fall-Related Injury  Outcome: Ongoing, Progressing  Intervention: Promote Injury-Free Environment  Recent Flowsheet Documentation  Taken 6/20/2024 1600 by Nola Meier RN  Safety Promotion/Fall Prevention:   activity supervised   assistive device/personal items within reach   clutter free environment maintained   toileting scheduled   safety round/check completed   room organization consistent  Taken 6/20/2024 1400 by Nola Meier RN  Safety Promotion/Fall Prevention:   activity supervised   assistive device/personal items within reach   clutter free environment maintained   toileting scheduled   safety round/check completed   room organization consistent  Taken 6/20/2024 1200 by Hardeep  JEWEL Vaca  Safety Promotion/Fall Prevention:   activity supervised   assistive device/personal items within reach   clutter free environment maintained   toileting scheduled   safety round/check completed   room organization consistent  Taken 6/20/2024 1000 by Nola Meier RN  Safety Promotion/Fall Prevention:   activity supervised   assistive device/personal items within reach   clutter free environment maintained   toileting scheduled   safety round/check completed   room organization consistent  Taken 6/20/2024 0800 by Nola Meier RN  Safety Promotion/Fall Prevention:   activity supervised   assistive device/personal items within reach   clutter free environment maintained   toileting scheduled   safety round/check completed   room organization consistent   Goal Outcome Evaluation:

## 2024-06-20 NOTE — PLAN OF CARE
Goal Outcome Evaluation:  Plan of Care Reviewed With: patient        Progress: no change  Outcome Evaluation: Interpretor Monse [id number] utilized this PTx, via  patient requested her daughter interpret for her. PT wound eval complete. Patient with mild edema in B LE. Patient would benefit from MLW to improve venous return and manage BLE edema/ girth. PT provided education about wraps and signs/symptoms to watch for if wraps too tight. PT also provided education about skin integrity. PT will check back in a couple of days to monitor skin and wraps

## 2024-06-20 NOTE — PLAN OF CARE
Goal Outcome Evaluation:  Plan of Care Reviewed With: patient, daughter           Outcome Evaluation: PT evaluation completed.  Pt stood with MinAx1 and ambulated 16' + 20' using rw with CGAx1.  Pt presents below baseline function d/t weakness and decreased activity tolerance.  Recommend home with assist and  PT.      Anticipated Discharge Disposition (PT): home with assist, home with home health

## 2024-06-20 NOTE — NURSING NOTE
WOC consulted for chronic lymphedema and venous stasis.    Per admitting MD note, chronic lymphedema and need for compression wraps. Toe ulcerations?    Discussed with patient's RN. Did not identify any wounds. Will consult PT to evaluate for compression therapy.  Keep legs elevated and heels offloaded.    Apply Allevyn dressing to sacrum and heels.    WOC will sign off.    Antelmo Hooper RN, BSN, CCRN, CWOCN  Wound, Ostomy and Continence (WOC) Department  [facility]

## 2024-06-20 NOTE — PAYOR COMM NOTE
"Moon Lerma (68 y.o. Female)    [facility] Dept contact information: Betzy REYNA [phone]  Fax [phone]   Date of Birth   1955    Social Security Number       Address   [address] [place] [address]    Home Phone   [phone]    MRN   [id number]       Holiness   None    Marital Status                               Admission Date   6/19/24    Admission Type   Emergency    Admitting Provider   Mali Carrillo MD    Attending Provider   Mali Carrillo MD    Department, Room/Bed   [address], S581/1       Discharge Date       Discharge Disposition       Discharge Destination                                 Attending Provider: Mali Carrillo MD    Allergies: No Known Allergies    Isolation: None   Infection: None   Code Status: CPR    Ht: 152.4 cm (60\")   Wt: 128 kg (282 lb 3 oz)    Admission Cmt: None   Principal Problem: Renal failure [N19]                   Active Insurance as of 6/19/2024       Primary Coverage       Payor Plan Insurance Group Employer/Plan Group    MISC COMMERCIAL MISC COMMERCIAL [id number]       Coverage Address Coverage Phone Number Coverage Fax Number Effective Dates    [address] [phone]  6/8/2024 - None Entered    [facility] [address]         Subscriber Name Subscriber Birth Date Member ID       MOON LERMA 1955 [id number]                     Emergency Contacts        (Rel.) Home Phone Work Phone Mobile Phone    SHOLA ONTIVEROS (Daughter) [phone] -- --    MARIO MARRERO (Other) [phone] -- --              [place]: [facility] [id number] Tax ID [id number]  Insurance Information                  MISC COMMERCIAL/Singly Phone: [phone]    Subscriber: Moon Lerma Subscriber#: [id number]    Group#: [id number] Precert#: --             History & Physical        James Zarate DO at 06/19/24 [facility]              [facility] Medicine Services  HISTORY AND PHYSICAL    Patient Name: Moon " "Maria Guadalupe  : 1955  MRN: [id number]  Primary Care Physician: Carmen Singh PA-C  Date of admission: 2024    Subjective  Subjective     Chief Complaint:  Dark stools     HPI:  Moon Lerma is a 68 y.o. female with PMH of breast cancer s/p right mastectomy , PE/ DVT 2016 on chronic anticoagulation, HTN, and chronic lymphedema presented to ED at request of PCP due to abnormal labs and SOA. Patient is from [facility], and traveled to the [place] a few weeks ago, now visiting family. Was treated fro chronic lymphedema vs mild RLE cellulitis with 20 days of oral antibiotics in [place] prior to traveling. (Cephalosporin/ Augmentin).  Dtr states RLE will weep clear fluid and legs are painful.   Has been having increasing SOA with ambulation over the last week since arriving to visit. Daughter helps with history and medical records. [facility] patient went to establish care with new PCP today with current complaints and was encouraged to go to ED for further evaluation with known SOA, dark tarry stools x 3-4 days. No abdominal pain, no n/v. Has had a decreased oral intake due to \"biting her tongue\" one week ago with left lateral tongue ulceration and significant pain. No f/c, hematochezia or hematemesis. No dizziness or lightheadedness. No chest pain or palpitations. No ASA, NSAID, or ETOH use. Does chronically take Pradaxa since 2016 for chronic DVT. No previous history of GIB or ulcerations. Never had an endoscopy.   In ED with labs with sodium 134, potassium 6.0, bicarb 17, creatinine 6.77, WBC 12, hemoglobin 5.8/ hematocrit 18.9. Positive fecal occult. CT a/p negative for any acute findings. Blood pressures and heart rate stable. Will admit to hospital medicine for further evaluation and treatment.         Review of Systems   Constitutional:  Positive for activity change and appetite change. Negative for fever.   HENT:  Positive for mouth sores. Negative for trouble swallowing and voice change.    Eyes:  " Negative for photophobia and visual disturbance.   Respiratory:  Positive for shortness of breath. Negative for cough and chest tightness.    Cardiovascular:  Positive for leg swelling. Negative for chest pain and palpitations.   Gastrointestinal:  Positive for blood in stool. Negative for abdominal distention, abdominal pain, nausea and vomiting.   Endocrine: Negative for cold intolerance and heat intolerance.   Genitourinary:  Negative for difficulty urinating, dysuria, flank pain and hematuria.   Musculoskeletal:  Negative for arthralgias, back pain, gait problem and joint swelling.   Skin:  Positive for wound. Negative for color change, pallor and rash.   Neurological:  Negative for dizziness, syncope, facial asymmetry, speech difficulty, weakness, light-headedness and numbness.   Hematological:  Negative for adenopathy. Does not bruise/bleed easily.   Psychiatric/Behavioral:  Negative for agitation, behavioral problems and confusion. The patient is not nervous/anxious.           Personal History     Past Medical History:   Diagnosis Date    Breast cancer     Clotting disorder     DVT (deep venous thrombosis)     Hypertension              Past Surgical History:   Procedure Laterality Date    MASTECTOMY, PARTIAL      left breast       Family History:  family history includes Diabetes in her father; No Known Problems in her mother.     Social History:  reports that she has never smoked. She has never used smokeless tobacco. She reports that she does not currently use alcohol. She reports that she does not use drugs.  Social History     Social History Narrative    Just arrived in the [place] from [place] to visit daughter        Medications:  Anastrozole, Calcium Carbonate, Dabigatran Etexilate Mesylate, NON FORMULARY, Prazosin HCl, and dabigatran etexilate    No Known Allergies    Objective  Objective     Vital Signs:   Temp:  [97.6 °F (36.4 °C)-98.2 °F (36.8 °C)] 98 °F (36.7 °C)  Heart Rate:  [77-93]  80  Resp:  [18-20] 20  BP: (106-146)/(74-88) 146/87    Physical Exam   Constitutional: Awake, alert  Eyes: PERRLA, sclerae anicteric, no conjunctival injection  HENT: NCAT, mucous membranes moist  Neck: Supple, no thyromegaly, no lymphadenopathy, trachea midline  Respiratory: Clear to auscultation bilaterally, nonlabored respirations   Cardiovascular: RRR, no murmurs, rubs, or gallops, palpable pedal pulses bilaterally  Gastrointestinal: Positive bowel sounds, soft, nontender, nondistended, obese  Musculoskeletal: No bilateral ankle edema (removed ace wrap from RLE) , no clubbing or cyanosis to extremities  Psychiatric: Appropriate affect, cooperative  Neurologic: Oriented x 3, strength symmetric in all extremities, Cranial Nerves grossly intact to confrontation, speech clear  Skin: No rashes, multiple venous ulcerations on bilateral feet/ toes with chronic venous stasis changes to BLE     Result Review:  I have personally reviewed the results from the time of this admission to 6/19/2024 17:10 EDT and agree with these findings:  [x]  Laboratory list / accordion  []  Microbiology  [x]  Radiology  [x]  EKG/Telemetry   []  Cardiology/Vascular   []  Pathology  []  Old records  []  Other:  Most notable findings include:     LAB RESULTS:      Lab 06/19/24  1328   WBC 12.02*   HEMOGLOBIN 5.8*   HEMATOCRIT 18.9*   PLATELETS 232   NEUTROS ABS 9.14*   IMMATURE GRANS (ABS) 0.12*   LYMPHS ABS 1.81   MONOS ABS 0.70   EOS ABS 0.22   .0*         Lab 06/19/24  1328   SODIUM 134*   POTASSIUM 6.0*   CHLORIDE 103   CO2 17.0*   ANION GAP 14.0   BUN 88*   CREATININE 6.77*   EGFR 6.2*   GLUCOSE 125*   CALCIUM 9.5         Lab 06/19/24  1328   TOTAL PROTEIN 7.3   ALBUMIN 3.2*   GLOBULIN 4.1   ALT (SGPT) 22   AST (SGOT) 30   BILIRUBIN 0.3   ALK PHOS 59         Lab 06/19/24  1328   PROBNP 3,437.0*   HSTROP T 26*             Lab 06/19/24  1422   ABO TYPING O   RH TYPING Positive   ANTIBODY SCREEN Negative         Brief Urine Lab  Results       None          Microbiology Results (last 10 days)       ** No results found for the last 240 hours. **            CT Abdomen Pelvis Without Contrast    Result Date: 6/19/2024  CT ABDOMEN PELVIS WO CONTRAST Date of Exam: 6/19/2024 2:45 PM EDT Indication: gi bleed. Comparison: None available. Technique: Axial CT images were obtained of the abdomen and pelvis without the administration of contrast. Reconstructed coronal and sagittal images were also obtained. Automated exposure control and iterative construction methods were used. Findings: Evaluation for GI bleeding is significantly limited in the absence of IV contrast. Accounting for this there is no noncontrast CT evidence of obvious GI hemorrhage. There are subpleural reticulations at the left lung base, likely chronic interstitial change. Unremarkable appearance of the liver, gallbladder, bile ducts, spleen, pancreas, adrenal glands, kidneys, ureters, bladder, uterus, and adnexa. No bowel obstruction or inflammatory change of the GI tract. Normal appendix. There is some fluid in the stomach, nonspecific. No abdominopelvic free fluid or fat stranding. No pneumoperitoneum. Unremarkable noncontrast appearance of the vasculature. There are shotty bilateral inguinal lymph nodes and shotty and mildly enlarged bilateral iliac chain lymph nodes, for example a left external iliac chain lymph node measuring 1.4 cm in short axis (series 2 image 105). No enlarged or conspicuous retroperitoneal nodes. Unremarkable appearance of the body wall soft tissues. No acute or suspicious bony findings. There is rightward curvature of the thoracolumbar spine.     Impression: Impression: No acute abdominopelvic findings. No obvious evidence of active GI bleeding noting that assessment is significantly limited in the absence of IV contrast. Shotty and mildly enlarged bilateral iliac chain lymph nodes, and shotty inguinal lymph nodes, nonspecific. No bulky adenopathy.  Electronically Signed: Joshua Meeks MD  6/19/2024 2:59 PM EDT  Workstation ID: [id number]    XR Chest 1 View    Result Date: 6/19/2024  XR CHEST 1 VW Date of Exam: 6/19/2024 1:43 PM EDT Indication: SOA triage protocol Comparison: None available. Findings: Exam is limited by obesity and low lung volumes. The left lower lobe is poorly penetrated. The right lung appears clear. Heart and pulmonary vessels appear within normal limits for technique. There are no definite effusions.     Impression: Impression: Suboptimal exam. No obvious acute abnormality. Electronically Signed: Piper Saldana MD  6/19/2024 2:46 PM EDT  Workstation ID: [id number]         Assessment & Plan  Assessment & Plan       Renal failure    Acute blood loss anemia    Hyperkalemia    Acute upper GI bleeding    Chronic deep vein thrombosis (DVT)    Primary hypertension    Chronic acquired lymphedema    Chronic cutaneous venous stasis ulcer    67 y/o female on chronic pradaxa for DVT, reported Cr of 1.4 in April, recent prolonged abx for LE edema and weeping; she presents w/ a Cr of 6.77 and K of 6.0, INR is >10, Hgb of 5.8, and melena; suspect high serum levels of pradaxa from renal failure precipitating GIB. Getting fluids w/ bicarb per nephro recs, transfusing blood, planned for GI and nephro eval in the morning. Adding urine studies for further eval of renal insufficiency. *Of note, family is concerned about patient's lack of insurance coverage while in the [place], they would like her stabilized enough to make the trip back to [facility] and complete the rest of her care there, as much as is reasonable    Acute blood loss anemia  Acute upper GIB  --H/H of 5.8/ 18.9 in ED, hemoglobin in April of 9.8 per records   --transfuse 2 units PRBC now  --PT/INR pending   --GI consultation   --clear liquids tonight, NPO after MN   --holding Pradaxa  --PPI      Acute renal failure  Hyperkalemia  Metabolic acidosis   --creatinine in April was 1.4  --creatinine in ED of  6.77 likely from decreased oral intake and anemia  --Nephrology consultation   --Potassium of 6.0, treated with Insulin/ Bicarb/ Dextrose   --started on Bicarb drip     Chronic LLE DVT  --holding Pradaxa for above  --discussed with Pharmacy, doesn't need DVT ppx at this time due to long half life of Pradaxa and severe renal disease, will keep on mechanical ppx for now       HTN  --cont Nebivolol and Amlodipine daily   (Home medication is a combo pill, family states takes BID, I will start at once daily and monitor )  --consider stopping Amlodipine for BLE edema   --cont Prazosin nightly     Chronic venous stasis ulcer  Chronic lymphedema  --PT wound for compression wraps and toe ulcerations     Oral ulceration   --magic mouthwash     History of breast cancer 2020      DVT prophylaxis:  Mechanical     CODE STATUS:    Level Of Support Discussed With: Patient  Code Status (Patient has no pulse and is not breathing): CPR (Attempt to Resuscitate)  Medical Interventions (Patient has pulse or is breathing): Full Support      Expected Discharge  Expected discharge date/ time has not been documented.    APC Time Spent: 60 minutes  This note has been completed as part of a split-shared workflow.     Signature: Electronically signed by VINITA Dodge, 06/19/24, 6:04 PM EDT        Attending   Admission Attestation       I have performed an independent face-to-face diagnostic evaluation including performing an independent physical examination.  I approve of the documented plan of care above that was reviewed and developed with the advanced practice clinician (APC) and take responsibility for that plan along with its associated risks.  I have updated the HPI as appropriate.    Brief HPI    This is a 67 y/o female from [place] here visiting family, w/ Hx of breast cancer s/p mastectomy on chronic hormonal therapy, chronic pradaxa use for dvt 2016, HTN; she has recently been on 20 days of oral abx for fluid weeping from her  lower extremities. She established with a new PCP this AM and reported new dark/tarry stools, new shortness of breath. In the ED she was noted to have a Cr of 6.77 w/ K of 6.0; family reports her Cr was 1.4 in April 2024.    Attending Physical Exam:  Temp:  [97.6 °F (36.4 °C)-98.3 °F (36.8 °C)] 98.3 °F (36.8 °C)  Heart Rate:  [70-93] 74  Resp:  [16-20] 16  BP: (106-152)/() 128/78    Constitutional: Awake, alert, laying in bed in NAD  Respiratory: Clear to auscultation bilaterally, respiratory effort normal   Cardiovascular: RRR, palpable radial pulse  Gastrointestinal: Positive bowel sounds, soft, nontender, nondistended  Musculoskeletal: BL LE edema  Psychiatric: Appropriate affect, cooperative  Neurologic: Speech clear    Result Review:  I have personally reviewed the results from the time of this admission to 6/20/2024 00:25 EDT and agree with these findings:  [x]  Laboratory list / accordion  []  Microbiology  []  Radiology  []  EKG/Telemetry   []  Cardiology/Vascular   []  Pathology  []  Old records  []  Other:  Most notable findings include: renal failure    Assessment and Plan:    See assessment and plan documented by APC above and updated/edited by me as appropriate.    Total time spent: 58  Time spent includes time reviewing chart, face-to-face time, counseling patient/family/caregiver, ordering medications/tests/procedures, communicating with other health care professionals, documenting clinical information in the electronic health record, and coordination of care.     James Zarate DO  06/20/24                      Electronically signed by James Zarate DO at 06/20/24 0038       Current Facility-Administered Medications   Medication Dose Route Frequency Provider Last Rate Last Admin    acetaminophen (TYLENOL) tablet 650 mg  650 mg Oral Q6H PRN Mali Carrillo MD   650 mg at 06/20/24 0850    amLODIPine (NORVASC) tablet 2.5 mg  2.5 mg Oral Q24H Negrita Padilla APRN   2.5 mg at 06/20/24  0850    sennosides-docusate (PERICOLACE) 8.6-50 MG per tablet 2 tablet  2 tablet Oral BID PRN Negrita Padilla APRN        And    polyethylene glycol (MIRALAX) packet 17 g  17 g Oral Daily PRN Negrita Padilla APRN        And    bisacodyl (DULCOLAX) EC tablet 5 mg  5 mg Oral Daily PRN Negrita Padilla APRN        And    bisacodyl (DULCOLAX) suppository 10 mg  10 mg Rectal Daily PRN Negrita Padilla APRN        nebivolol (BYSTOLIC) tablet 2.5 mg  2.5 mg Oral Q24H Negrita Padilla APRN   2.5 mg at 06/20/24 0850    pantoprazole (PROTONIX) injection 40 mg  40 mg Intravenous BID Rosanna Valencia PA        sodium bicarbonate 8.4 % 150 mEq in dextrose (D5W) 5 % 1,000 mL infusion (greater than 100 mEq)  150 mEq Intravenous Continuous Emerald Bravo  mL/hr at 06/20/24 0239 150 mEq at 06/20/24 0239    sodium chloride 0.9 % flush 10 mL  10 mL Intravenous PRN Emerald Bravo MD        sodium chloride 0.9 % flush 10 mL  10 mL Intravenous Q12H Negrita Padilla APRN   10 mL at 06/20/24 0851    sodium chloride 0.9 % flush 10 mL  10 mL Intravenous PRN Negrita Padilla APRN        sodium chloride 0.9 % infusion 40 mL  40 mL Intravenous PRN Negrita Padilla APRN        sodium zirconium cyclosilicate (LOKELMA) packet 10 g  10 g Oral TID Reji Deutsch MD   10 g at 06/20/24 0850    terazosin (HYTRIN) capsule 2 mg  2 mg Oral Nightly Negrita Padilla APRN   2 mg at 06/19/24 2134     Lab Results (last 24 hours)       Procedure Component Value Units Date/Time    Urinalysis, Microscopic Only - Urine, Clean Catch [[id number]]  (Abnormal) Collected: 06/20/24 1014    Specimen: Urine, Clean Catch Updated: 06/20/24 1047     RBC, UA 6-10 /HPF      WBC, UA Too Numerous to Count /HPF      Bacteria, UA 4+ /HPF      Squamous Epithelial Cells, UA 3-6 /HPF      Hyaline Casts, UA 0-6 /LPF      Methodology Manual Light Microscopy    Sodium, Urine, Random - Urine, Clean Catch [[id number]] Collected: 06/20/24 1014    Specimen:  Urine, Clean Catch Updated: 06/20/24 1040     Sodium, Urine 75 mmol/L     Narrative:      Reference intervals for random urine have not been established.  Clinical usage is dependent upon physician's interpretation in combination with other laboratory tests.       Protein, Urine, Random - Urine, Clean Catch [[id number]] Collected: 06/20/24 1014    Specimen: Urine, Clean Catch Updated: 06/20/24 1040     Total Protein, Urine 17.2 mg/dL     Narrative:      Reference intervals for random urine have not been established.  Clinical usage is dependent upon physician's interpretation in combination with other laboratory tests.       Urinalysis With Microscopic If Indicated (No Culture) - Urine, Clean Catch [[id number]]  (Abnormal) Collected: 06/20/24 1014    Specimen: Urine, Clean Catch Updated: 06/20/24 1031     Color, UA Yellow     Appearance, UA Turbid     pH, UA 6.5     Specific Gravity, UA 1.008     Glucose, UA Negative     Ketones, UA Negative     Bilirubin, UA Negative     Blood, UA Moderate (2+)     Protein, UA Trace     Leuk Esterase, UA Large (3+)     Nitrite, UA Negative     Urobilinogen, UA 0.2 E.U./dL    Creatinine Urine Random (kidney function) GFR component - Urine, Clean Catch [[id number]] Collected: 06/20/24 1015    Specimen: Urine, Clean Catch Updated: 06/20/24 1015    Basic Metabolic Panel [[id number]]  (Abnormal) Collected: 06/20/24 0035    Specimen: Blood Updated: 06/20/24 0103     Glucose 117 mg/dL      BUN 85 mg/dL      Creatinine 6.35 mg/dL      Sodium 137 mmol/L      Potassium 5.6 mmol/L      Comment: Slight hemolysis detected by analyzer. Result may be falsely elevated.        Chloride 105 mmol/L      CO2 19.0 mmol/L      Calcium 9.1 mg/dL      BUN/Creatinine Ratio 13.4     Anion Gap 13.0 mmol/L      eGFR 6.7 mL/min/1.73      Comment: <15 Indicative of kidney failure       Narrative:      GFR Normal >60  Chronic Kidney Disease <60  Kidney Failure <15      CBC Auto Differential [[id number]]  (Abnormal)  Collected: 06/20/24 0035    Specimen: Blood Updated: 06/20/24 0055     WBC 10.07 10*3/mm3      RBC 2.68 10*6/mm3      Hemoglobin 8.0 g/dL      Hematocrit 24.6 %      MCV 91.8 fL      MCH 29.9 pg      MCHC 32.5 g/dL      RDW 17.9 %      RDW-SD 58.7 fl      MPV 11.3 fL      Platelets 209 10*3/mm3      Neutrophil % 71.1 %      Lymphocyte % 20.1 %      Monocyte % 5.7 %      Eosinophil % 2.2 %      Basophil % 0.2 %      Immature Grans % 0.7 %      Neutrophils, Absolute 7.17 10*3/mm3      Lymphocytes, Absolute 2.02 10*3/mm3      Monocytes, Absolute 0.57 10*3/mm3      Eosinophils, Absolute 0.22 10*3/mm3      Basophils, Absolute 0.02 10*3/mm3      Immature Grans, Absolute 0.07 10*3/mm3      nRBC 0.0 /100 WBC     Hemoglobin A1c [[id number]]  (Abnormal) Collected: 06/19/24 2219    Specimen: Blood Updated: 06/20/24 0007     Hemoglobin A1C 5.70 %     Narrative:      Hemoglobin A1C Ranges:    Increased Risk for Diabetes  5.7% to 6.4%  Diabetes                     >= 6.5%  Diabetic Goal                < 7.0%    POC Glucose Once [[id number]]  (Normal) Collected: 06/19/24 1938    Specimen: Blood Updated: 06/19/24 1940     Glucose 113 mg/dL     Protime-INR [[id number]]  (Abnormal) Collected: 06/19/24 1328    Specimen: Blood Updated: 06/19/24 1925     Protime 82.1 Seconds      INR >10.00    POC Glucose Once [[id number]]  (Normal) Collected: 06/19/24 1835    Specimen: Blood Updated: 06/19/24 1837     Glucose 116 mg/dL     POC Glucose Once [[id number]]  (Normal) Collected: 06/19/24 1724    Specimen: Blood Updated: 06/19/24 1725     Glucose 128 mg/dL     POC Glucose Once [[id number]]  (Abnormal) Collected: 06/19/24 1611    Specimen: Blood Updated: 06/19/24 1612     Glucose 188 mg/dL     POC Glucose Once [[id number]]  (Normal) Collected: 06/19/24 1546    Specimen: Blood Updated: 06/19/24 1552     Glucose 117 mg/dL     POC Occult Blood Stool [[id number]]  (Abnormal) Resulted: 06/19/24 1503    Specimen: Stool from Per Rectum Updated: 06/19/24 1505      Fecal Occult Blood Positive     Lot Number [id number]     Expiration Date 10-26     DEVELOPER LOT NUMBER [id number]     DEVELOPER EXPIRATION DATE 2,027-2     Positive Control Positive     Negative Control Negative    Comprehensive Metabolic Panel [[id number]]  (Abnormal) Collected: 06/19/24 1328    Specimen: Blood Updated: 06/19/24 1406     Glucose 125 mg/dL      BUN 88 mg/dL      Creatinine 6.77 mg/dL      Sodium 134 mmol/L      Potassium 6.0 mmol/L      Chloride 103 mmol/L      CO2 17.0 mmol/L      Calcium 9.5 mg/dL      Total Protein 7.3 g/dL      Albumin 3.2 g/dL      ALT (SGPT) 22 U/L      AST (SGOT) 30 U/L      Alkaline Phosphatase 59 U/L      Total Bilirubin 0.3 mg/dL      Globulin 4.1 gm/dL      Comment: Calculated Result        A/G Ratio 0.8 g/dL      BUN/Creatinine Ratio 13.0     Anion Gap 14.0 mmol/L      eGFR 6.2 mL/min/1.73      Comment: <15 Indicative of kidney failure       Narrative:      GFR Normal >60  Chronic Kidney Disease <60  Kidney Failure <15      BNP [[id number]]  (Abnormal) Collected: 06/19/24 1328    Specimen: Blood Updated: 06/19/24 1403     proBNP 3,437.0 pg/mL     Narrative:      This assay is used as an aid in the diagnosis of individuals suspected of having heart failure. It can be used as an aid in the diagnosis of acute decompensated heart failure (ADHF) in patients presenting with signs and symptoms of ADHF to the emergency department (ED). In addition, NT-proBNP of <300 pg/mL indicates ADHF is not likely.    Age Range Result Interpretation  NT-proBNP Concentration (pg/mL:      <50             Positive            >450                   Gray                 300-450                    Negative             <300    50-75           Positive            >900                  Gray                300-900                  Negative            <300      >75             Positive            >1800                  Gray                300-1800                  Negative            <300    Single High  Sensitivity Troponin T [[id number]]  (Abnormal) Collected: 06/19/24 1328    Specimen: Blood Updated: 06/19/24 1403     HS Troponin T 26 ng/L     Narrative:      High Sensitive Troponin T Reference Range:  <14.0 ng/L- Negative Female for AMI  <22.0 ng/L- Negative Male for AMI  >=14 - Abnormal Female indicating possible myocardial injury.  >=22 - Abnormal Male indicating possible myocardial injury.   Clinicians would have to utilize clinical acumen, EKG, Troponin, and serial changes to determine if it is an Acute Myocardial Infarction or myocardial injury due to an underlying chronic condition.         CBC & Differential [[id number]]  (Abnormal) Collected: 06/19/24 1328    Specimen: Blood Updated: 06/19/24 1347    Narrative:      The following orders were created for panel order CBC & Differential.  Procedure                               Abnormality         Status                     ---------                               -----------         ------                     CBC Auto Differential[[id number]]        Abnormal            Final result                 Please view results for these tests on the individual orders.    CBC Auto Differential [[id number]]  (Abnormal) Collected: 06/19/24 1328    Specimen: Blood Updated: 06/19/24 1347     WBC 12.02 10*3/mm3      RBC 1.89 10*6/mm3      Hemoglobin 5.8 g/dL      Hematocrit 18.9 %      .0 fL      MCH 30.7 pg      MCHC 30.7 g/dL      RDW 16.5 %      RDW-SD 59.2 fl      MPV 11.3 fL      Platelets 232 10*3/mm3      Neutrophil % 76.1 %      Lymphocyte % 15.1 %      Monocyte % 5.8 %      Eosinophil % 1.8 %      Basophil % 0.2 %      Immature Grans % 1.0 %      Neutrophils, Absolute 9.14 10*3/mm3      Lymphocytes, Absolute 1.81 10*3/mm3      Monocytes, Absolute 0.70 10*3/mm3      Eosinophils, Absolute 0.22 10*3/mm3      Basophils, Absolute 0.03 10*3/mm3      Immature Grans, Absolute 0.12 10*3/mm3      nRBC 0.0 /100 WBC     [facility] Draw [[id number]] Collected: 06/19/24 1328     Specimen: Blood Updated: 06/19/24 1345    Narrative:      The following orders were created for panel order [facility] Draw.  Procedure                               Abnormality         Status                     ---------                               -----------         ------                     Green Top (Gel)[[id number]]                                  Final result               Lavender Top[[id number]]                                     Final result               Gold Top - SST[[id number]]                                   Final result               Ackerman Top[[id number]]                                         Final result               Light Blue Top[[id number]]                                   Final result                 Please view results for these tests on the individual orders.    Green Top (Gel) [[id number]] Collected: 06/19/24 1328    Specimen: Blood Updated: 06/19/24 1345     Extra Tube Hold for add-ons.     Comment: Auto resulted.       Lavender Top [[id number]] Collected: 06/19/24 1328    Specimen: Blood Updated: 06/19/24 1345     Extra Tube hold for add-on     Comment: Auto resulted       Gold Top - SST [[id number]] Collected: 06/19/24 1328    Specimen: Blood Updated: 06/19/24 1345     Extra Tube Hold for add-ons.     Comment: Auto resulted.       Gray Top [[id number]] Collected: 06/19/24 1328    Specimen: Blood Updated: 06/19/24 1345     Extra Tube Hold for add-ons.     Comment: Auto resulted.       Light Blue Top [[id number]] Collected: 06/19/24 1328    Specimen: Blood Updated: 06/19/24 1345     Extra Tube Hold for add-ons.     Comment: Auto resulted             Imaging Results (Last 24 Hours)       Procedure Component Value Units Date/Time    CT Abdomen Pelvis Without Contrast [[id number]] Collected: 06/19/24 1454     Updated: 06/19/24 1503    Narrative:      CT ABDOMEN PELVIS WO CONTRAST    Date of Exam: 6/19/2024 2:45 PM EDT    Indication: gi bleed.    Comparison: None available.    Technique: Axial CT  images were obtained of the abdomen and pelvis without the administration of contrast. Reconstructed coronal and sagittal images were also obtained. Automated exposure control and iterative construction methods were used.      Findings:  Evaluation for GI bleeding is significantly limited in the absence of IV contrast. Accounting for this there is no noncontrast CT evidence of obvious GI hemorrhage.    There are subpleural reticulations at the left lung base, likely chronic interstitial change. Unremarkable appearance of the liver, gallbladder, bile ducts, spleen, pancreas, adrenal glands, kidneys, ureters, bladder, uterus, and adnexa. No bowel   obstruction or inflammatory change of the GI tract. Normal appendix. There is some fluid in the stomach, nonspecific. No abdominopelvic free fluid or fat stranding. No pneumoperitoneum. Unremarkable noncontrast appearance of the vasculature. There are   shotty bilateral inguinal lymph nodes and shotty and mildly enlarged bilateral iliac chain lymph nodes, for example a left external iliac chain lymph node measuring 1.4 cm in short axis (series 2 image 105). No enlarged or conspicuous retroperitoneal   nodes. Unremarkable appearance of the body wall soft tissues. No acute or suspicious bony findings. There is rightward curvature of the thoracolumbar spine.      Impression:      Impression:  No acute abdominopelvic findings. No obvious evidence of active GI bleeding noting that assessment is significantly limited in the absence of IV contrast.    Shotty and mildly enlarged bilateral iliac chain lymph nodes, and shotty inguinal lymph nodes, nonspecific. No bulky adenopathy.            Electronically Signed: Joshua Meeks MD    6/19/2024 2:59 PM EDT    Workstation ID: [id number]    XR Chest 1 View [[id number]] Collected: 06/19/24 1445     Updated: 06/19/24 1449    Narrative:      XR CHEST 1 VW    Date of Exam: 6/19/2024 1:43 PM EDT    Indication: SOA triage  protocol    Comparison: None available.    Findings:  Exam is limited by obesity and low lung volumes. The left lower lobe is poorly penetrated. The right lung appears clear. Heart and pulmonary vessels appear within normal limits for technique. There are no definite effusions.      Impression:      Impression:  Suboptimal exam. No obvious acute abnormality.      Electronically Signed: Piper Saldana MD    6/19/2024 2:46 PM EDT    Workstation ID: [id number]          Orders (last 24 hrs)        Start     Ordered    06/21/24 0600  Basic Metabolic Panel  Morning Draw         06/20/24 0758    06/21/24 0600  Magnesium  Morning Draw         06/20/24 0758    06/21/24 0600  Phosphorus  Morning Draw         06/20/24 0758    06/21/24 0600  CBC (No Diff)  Morning Draw         06/20/24 0758    06/21/24 0600  Protime-INR  Morning Draw         06/20/24 1040    06/20/24 1730  pantoprazole (PROTONIX) injection 40 mg  2 Times Daily Before Meals         06/20/24 0932    06/20/24 1200  Basic Metabolic Panel  Timed         06/20/24 0758    06/20/24 1024  Urinalysis, Microscopic Only - Urine, Clean Catch  Once         06/20/24 1023    06/20/24 1011  DIET MESSAGE Can she have wheat toast and jelly sent up please?  Once        Comments: Can she have wheat toast and jelly sent up please?    06/20/24 1010    06/20/24 0927  Diet: Regular/House, Vegetarian, Renal; Lacto-Ovo Vegetarian (Allows dairy, eggs); Low Potassium; Fluid Consistency: Thin (IDDSI 0)  Diet Effective Now         06/20/24 0929    06/20/24 0900  nebivolol (BYSTOLIC) tablet 2.5 mg  Every 24 Hours Scheduled         06/19/24 1817    06/20/24 0900  amLODIPine (NORVASC) tablet 2.5 mg  Every 24 Hours Scheduled         06/19/24 1817    06/20/24 0900  sodium zirconium cyclosilicate (LOKELMA) packet 10 g  3 Times Daily         06/20/24 0212    06/20/24 0857  PT Wound Care  Once        Comments: Lizzy unknown    06/20/24 0857    06/20/24 0801  NPO Diet NPO Type: Sips with Meds  Diet  Effective Midnight,   Status:  Canceled         06/20/24 0800    06/20/24 0800  acetaminophen (TYLENOL) tablet 650 mg  Every 6 Hours PRN         06/20/24 0800    06/20/24 0800  Oral Care  2 Times Daily       06/19/24 1817 06/20/24 0600  Basic Metabolic Panel  Morning Draw,   Status:  Canceled         06/19/24 1817 06/20/24 0600  CBC Auto Differential  Morning Draw         06/19/24 1817 06/20/24 0600  pantoprazole (PROTONIX) injection 40 mg  Every Early Morning,   Status:  Discontinued         06/19/24 1817 06/20/24 0028  Basic Metabolic Panel  STAT         06/20/24 0027    06/20/24 0027  Sodium, Urine, Random - Urine, Clean Catch  Once         06/20/24 0026    06/20/24 0027  Protein, Urine, Random - Urine, Clean Catch  Once         06/20/24 0026    06/20/24 0027  Creatinine Urine Random (kidney function) GFR component - Urine, Clean Catch  Once         06/20/24 0026    06/20/24 0026  Urinalysis With Microscopic If Indicated (No Culture) - Urine, Clean Catch  Once         06/20/24 0026    06/20/24 0015  Hemoglobin & Hematocrit, Blood  STAT,   Status:  Canceled         06/20/24 0014    06/20/24 0001  NPO Diet NPO Type: Strict NPO  Diet Effective Midnight,   Status:  Canceled         06/19/24 1817 06/19/24 2224  PT Consult: Eval & Treat Functional Mobility Below Baseline  Once         06/19/24 2223    06/19/24 2224  OT Consult: Eval & Treat ADL Performance Below Baseline  Once         06/19/24 2223    06/19/24 2109  Hemoglobin A1c  Once         06/19/24 1817 06/19/24 2100  terazosin (HYTRIN) capsule 2 mg  Nightly         06/19/24 1817 06/19/24 2100  sodium chloride 0.9 % flush 10 mL  Every 12 Hours Scheduled         06/19/24 1817 06/19/24 2100  heparin (porcine) 5000 UNIT/ML injection 5,000 Units  Every 12 Hours Scheduled,   Status:  Discontinued         06/19/24 1805 06/19/24 2000  Vital Signs  Every 4 Hours       06/19/24 1817 06/19/24 1941  POC Glucose Once  PROCEDURE ONCE       "  Comments: Complete no more than 45 minutes prior to patient eating      06/19/24 1938    06/19/24 1838  POC Glucose Once  PROCEDURE ONCE        Comments: Complete no more than 45 minutes prior to patient eating      06/19/24 1835 06/19/24 1818  Intake & Output  Every Shift       06/19/24 1817 06/19/24 1818  Weigh Patient  Once         06/19/24 1817 06/19/24 1818  Insert Peripheral IV  Once         06/19/24 1817 06/19/24 1818  Saline Lock & Maintain IV Access  Continuous         06/19/24 1817 06/19/24 1818  Place Sequential Compression Device  Once         06/19/24 1817 06/19/24 1818  Maintain Sequential Compression Device  Continuous,   Status:  Canceled         06/19/24 1817 06/19/24 1818  Continuous Pulse Oximetry  Continuous         06/19/24 1817 06/19/24 1818  Inpatient Gastroenterology Consult  Once        Specialty:  Gastroenterology  Provider:  (Not yet assigned)    06/19/24 1817 06/19/24 1818  Protime-INR  Once         06/19/24 1817 06/19/24 1817  sodium chloride 0.9 % flush 10 mL  As Needed         06/19/24 1817 06/19/24 1817  sodium chloride 0.9 % infusion 40 mL  As Needed         06/19/24 1817 06/19/24 1817  sennosides-docusate (PERICOLACE) 8.6-50 MG per tablet 2 tablet  2 Times Daily PRN        Placed in \"And\" Linked Group    06/19/24 1817 06/19/24 1817  polyethylene glycol (MIRALAX) packet 17 g  Daily PRN        Placed in \"And\" Linked Group    06/19/24 1817 06/19/24 1817  bisacodyl (DULCOLAX) EC tablet 5 mg  Daily PRN        Placed in \"And\" Linked Group    06/19/24 1817 06/19/24 1817  bisacodyl (DULCOLAX) suppository 10 mg  Daily PRN        Placed in \"And\" Linked Group    06/19/24 1817 06/19/24 1749  Diet: Regular/House, Liquid, Vegetarian; Vegan (No animal products); Clear Liquid; Texture: Regular (IDDSI 7); Fluid Consistency: Thin (IDDSI 0)  Diet Effective Now,   Status:  Canceled         06/19/24 1749    06/19/24 1726  POC Glucose Once  PROCEDURE ONCE "        Comments: Complete no more than 45 minutes prior to patient eating      06/19/24 1724    06/19/24 1720  Critical Care  Once        Comments: This order was created via procedure documentation    06/19/24 1719    06/19/24 1712  Wound Ostomy Eval & Treat  Once         06/19/24 1712    06/19/24 1707  Code Status and Medical Interventions:  Continuous         06/19/24 1709    06/19/24 1630  sodium bicarbonate 8.4 % 150 mEq in dextrose (D5W) 5 % 1,000 mL infusion (greater than 100 mEq)  Continuous         06/19/24 1554    06/19/24 1613  POC Glucose Once  PROCEDURE ONCE        Comments: Complete no more than 45 minutes prior to patient eating      06/19/24 1611    06/19/24 1606  pantoprazole (PROTONIX) injection 80 mg  Once         06/19/24 1550    06/19/24 1606  sodium bicarbonate injection 8.4% 50 mEq  Once         06/19/24 1550    06/19/24 1600  POC Glucose Q1H  Every Hour      Comments: POC Glucose - 30 Minutes After Insulin Administration & Then Q1H x3      06/19/24 1502    06/19/24 1559  calcium gluconate 1000 Mg/50ml 0.675% NaCl IV SOLN  Once         06/19/24 1543    06/19/24 1558  dextrose (D5W) 5 % infusion  Continuous,   Status:  Discontinued         06/19/24 1542    06/19/24 1554  ED Bed Request  Once         06/19/24 1553    06/19/24 1554  Inpatient Admission  Once         06/19/24 1553    06/19/24 1553  POC Glucose Once  PROCEDURE ONCE        Comments: Complete no more than 45 minutes prior to patient eating      06/19/24 1546    06/19/24 1518  insulin regular (humuLIN R,novoLIN R) injection 5 Units  Once         06/19/24 1502    06/19/24 1518  dextrose (D50W) (25 g/50 mL) IV injection 25 g  Once         06/19/24 1502    06/19/24 1518  sodium zirconium cyclosilicate (LOKELMA) packet 10 g  Once         06/19/24 1502    06/19/24 1441  ABO RH Specimen Verification  STAT         06/19/24 1440    06/19/24 1415  CT Abdomen Pelvis Without Contrast  1 Time Imaging         06/19/24 1414    06/19/24 1415  POC  Occult Blood Stool  Once         06/19/24 1414    06/19/24 1359  Type & Screen  STAT         06/19/24 1359    06/19/24 1359  Verify Informed Consent  Once         06/19/24 1359    06/19/24 1359  Prepare RBC, 2 Units  Blood - Once         06/19/24 1359    06/19/24 1358  Transfuse RBC, 2 Units Infuse Each Unit Over: 2H  Transfusion       06/19/24 1359    06/19/24 1320  NPO Diet NPO Type: Strict NPO  Diet Effective Now,   Status:  Canceled         06/19/24 1319    06/19/24 1320  Undress & Gown  Once         06/19/24 1319    06/19/24 1320  Cardiac Monitoring  Continuous        Comments: Follow Standing Orders As Outlined in Process Instructions (Open Order Report to View Full Instructions)    06/19/24 1319    06/19/24 1320  Continuous Pulse Oximetry  Continuous,   Status:  Canceled         06/19/24 1319    06/19/24 1320  Vital Signs  Per Hospital Policy         06/19/24 1319    06/19/24 1320  ECG 12 Lead ED Triage Standing Order; SOA  Once         06/19/24 1319    06/19/24 1320  XR Chest 1 View  1 Time Imaging         06/19/24 1319    06/19/24 1320  Insert Peripheral IV  Once         06/19/24 1319    06/19/24 1320  [facility] Draw  Once         06/19/24 1319    06/19/24 1320  CBC & Differential  Once         06/19/24 1319    06/19/24 1320  Comprehensive Metabolic Panel  Once         06/19/24 1319    06/19/24 1320  BNP  Once         06/19/24 1319    06/19/24 1320  Single High Sensitivity Troponin T  Once         06/19/24 1319    06/19/24 1320  Green Top (Gel)  PROCEDURE ONCE         06/19/24 1319    06/19/24 1320  Lavender Top  PROCEDURE ONCE         06/19/24 1319    06/19/24 1320  Gold Top - SST  PROCEDURE ONCE         06/19/24 1319    06/19/24 1320  Ackerman Top  PROCEDURE ONCE         06/19/24 1319    06/19/24 1320  Light Blue Top  PROCEDURE ONCE         06/19/24 1319    06/19/24 1320  CBC Auto Differential  PROCEDURE ONCE         06/19/24 1319    06/19/24 1319  sodium chloride 0.9 % flush 10 mL  As Needed         06/19/24  1319    Unscheduled  Oxygen Therapy- Nasal Cannula; Titrate 1-6 LPM Per SpO2; 90 - 95%  Continuous PRN       24 1319    Unscheduled  Up With Assistance  As Needed       24    --  Fulvestrant (FASLODEX) 250 MG/5ML chemo syringe  Every 30 Days         24    --  rosuvastatin (CRESTOR) 10 MG tablet  Nightly         24                     Physician Progress Notes (last 24 hours)        Mali Carrillo MD at 24 0756              [facility]  PROGRESS NOTE    Patient Name: Moon Lerma  : 1955  MRN: [id number]    Date of Admission: 2024  Primary Care Physician: Carmen Singh PA-C    Subjective   Subjective     CC:  Acute renal failure    HPI:  Walked around with therapy today without any shortness of breath.  No nausea or vomiting.  No bowel movement.  Patient's daughter is at bedside.    Objective   Objective     Vital Signs:   Temp:  [97.4 °F (36.3 °C)-98.3 °F (36.8 °C)] 97.4 °F (36.3 °C)  Heart Rate:  [70-93] 74  Resp:  [16-20] 18  BP: (106-152)/() 119/69     Physical Exam:  Constitutional: No acute distress, awake, alert, obese  HENT: NCAT, mucous membranes moist  Respiratory: Clear to auscultation bilaterally, respiratory effort normal   Cardiovascular: RRR, no murmurs, rubs, or gallops  Gastrointestinal: Positive bowel sounds, soft, nontender, nondistended  Musculoskeletal: No bilateral ankle edema  Psychiatric: Appropriate affect, cooperative  Neurologic: PERRL, symmetric facies, moves all extremities, speech clear  Skin: No rashes    Results Reviewed:  LAB RESULTS:      Lab 24  0035 24  1328   WBC 10.07 12.02*   HEMOGLOBIN 8.0* 5.8*   HEMATOCRIT 24.6* 18.9*   PLATELETS 209 232   NEUTROS ABS 7.17* 9.14*   IMMATURE GRANS (ABS) 0.07* 0.12*   LYMPHS ABS 2.02 1.81   MONOS ABS 0.57 0.70   EOS ABS 0.22 0.22   MCV 91.8 100.0*   PROTIME  --  82.1*         Lab 24  0035 24  7411  06/19/24  1328   SODIUM 137  --  134*   POTASSIUM 5.6*  --  6.0*   CHLORIDE 105  --  103   CO2 19.0*  --  17.0*   ANION GAP 13.0  --  14.0   BUN 85*  --  88*   CREATININE 6.35*  --  6.77*   EGFR 6.7*  --  6.2*   GLUCOSE 117*  --  125*   CALCIUM 9.1  --  9.5   HEMOGLOBIN A1C  --  5.70*  --          Lab 06/19/24  1328   TOTAL PROTEIN 7.3   ALBUMIN 3.2*   GLOBULIN 4.1   ALT (SGPT) 22   AST (SGOT) 30   BILIRUBIN 0.3   ALK PHOS 59         Lab 06/19/24  1328   PROBNP 3,437.0*   HSTROP T 26*   PROTIME 82.1*   INR >10.00*             Lab 06/19/24  1422   ABO TYPING O   RH TYPING Positive   ANTIBODY SCREEN Negative         Brief Urine Lab Results       None            Microbiology Results Abnormal       None            CT Abdomen Pelvis Without Contrast    Result Date: 6/19/2024  CT ABDOMEN PELVIS WO CONTRAST Date of Exam: 6/19/2024 2:45 PM EDT Indication: gi bleed. Comparison: None available. Technique: Axial CT images were obtained of the abdomen and pelvis without the administration of contrast. Reconstructed coronal and sagittal images were also obtained. Automated exposure control and iterative construction methods were used. Findings: Evaluation for GI bleeding is significantly limited in the absence of IV contrast. Accounting for this there is no noncontrast CT evidence of obvious GI hemorrhage. There are subpleural reticulations at the left lung base, likely chronic interstitial change. Unremarkable appearance of the liver, gallbladder, bile ducts, spleen, pancreas, adrenal glands, kidneys, ureters, bladder, uterus, and adnexa. No bowel obstruction or inflammatory change of the GI tract. Normal appendix. There is some fluid in the stomach, nonspecific. No abdominopelvic free fluid or fat stranding. No pneumoperitoneum. Unremarkable noncontrast appearance of the vasculature. There are shotty bilateral inguinal lymph nodes and shotty and mildly enlarged bilateral iliac chain lymph nodes, for example a left external  iliac chain lymph node measuring 1.4 cm in short axis (series 2 image 105). No enlarged or conspicuous retroperitoneal nodes. Unremarkable appearance of the body wall soft tissues. No acute or suspicious bony findings. There is rightward curvature of the thoracolumbar spine.     Impression: Impression: No acute abdominopelvic findings. No obvious evidence of active GI bleeding noting that assessment is significantly limited in the absence of IV contrast. Shotty and mildly enlarged bilateral iliac chain lymph nodes, and shotty inguinal lymph nodes, nonspecific. No bulky adenopathy. Electronically Signed: Joshua Meeks MD  6/19/2024 2:59 PM EDT  Workstation ID: [id number]    XR Chest 1 View    Result Date: 6/19/2024  XR CHEST 1 VW Date of Exam: 6/19/2024 1:43 PM EDT Indication: SOA triage protocol Comparison: None available. Findings: Exam is limited by obesity and low lung volumes. The left lower lobe is poorly penetrated. The right lung appears clear. Heart and pulmonary vessels appear within normal limits for technique. There are no definite effusions.     Impression: Impression: Suboptimal exam. No obvious acute abnormality. Electronically Signed: Piper Saldana MD  6/19/2024 2:46 PM EDT  Workstation ID: [id number]         Current medications:  Scheduled Meds:amLODIPine, 2.5 mg, Oral, Q24H  nebivolol, 2.5 mg, Oral, Q24H  pantoprazole, 40 mg, Intravenous, Q AM  sodium chloride, 10 mL, Intravenous, Q12H  sodium zirconium cyclosilicate, 10 g, Oral, TID  terazosin, 2 mg, Oral, Nightly      Continuous Infusions:sodium bicarbonate 8.4 % 150 mEq in dextrose (D5W) 5 % 1,000 mL infusion (greater than 100 mEq), 150 mEq, Last Rate: 150 mEq (06/20/24 0239)      PRN Meds:.  senna-docusate sodium **AND** polyethylene glycol **AND** bisacodyl **AND** bisacodyl    sodium chloride    sodium chloride    sodium chloride    Assessment & Plan   Assessment & Plan     Active Hospital Problems    Diagnosis  POA    **Renal failure [N19]   Yes    Acute blood loss anemia [D62]  Unknown    Hyperkalemia [E87.5]  Unknown    Acute upper GI bleeding [K92.2]  Unknown    Chronic deep vein thrombosis (DVT) [I82.509]  Unknown    Primary hypertension [I10]  Unknown    Chronic acquired lymphedema [I89.0]  Unknown    Chronic cutaneous venous stasis ulcer [I83.009, L97.909]  Unknown      Resolved Hospital Problems   No resolved problems to display.        Brief Hospital Course to date:  Moon Lerma is a 68 y.o. female on chronic pradaxa for DVT, reported Cr of 1.4 in April, recent prolonged abx for LE edema and weeping; she presents w/ a Cr of 6.77 and K of 6.0, INR is >10, Hgb of 5.8, and melena; suspect high serum levels of pradaxa from renal failure, which precipitated GIB.      *Of note, family is concerned about patient's lack of insurance coverage while in the [place], they would like her stabilized enough to make the trip back to [facility] and complete the rest of her care there, as much as is reasonable     Acute blood loss anemia  Acute upper GIB  --H/H of 5.8/ 18.9 in ED, hemoglobin in April of 9.8 per records   --s/p 2 units RBC   --GI consulted  -- If melena persists once INR is corrected, then may perform EGD/colonoscopy inpatient.  Can consider this to be performed in [facility] if patient stable with medical management  --holding Pradaxa  --PPI     Acute renal failure  Hyperkalemia  Metabolic acidosis   --creatinine in April was 1.4  --creatinine in ED of 6.77 likely from decreased oral intake and anemia  --Nephrology consulted  --Potassium of 6.0, treated with Insulin/ Bicarb/ Dextrose   --started on Bicarb drip   --continue TID lokelma  --repeat BMP midday     Chronic LLE DVT  --holding Pradaxa for above  --my practice partner discussed with Pharmacy, doesn't need DVT ppx at this time due to long half life of Pradaxa and severe renal disease; no SCDs given DVT     HTN  --cont Nebivolol and Amlodipine daily   --consider stopping Amlodipine for BLE edema    --cont Prazosin nightly      Chronic venous stasis ulcer  Chronic lymphedema  --PT wound for compression wraps and toe ulcerations      Oral ulceration --magic mouthwash   History of breast cancer 2020    Expected Discharge Location and Transportation: home  Expected Discharge   Expected Discharge Date: 6/22/2024; Expected Discharge Time:      VTE Prophylaxis:  Mechanical VTE prophylaxis orders are present.         AM-PAC 6 Clicks Score (PT): 12 (06/19/24 2015)    CODE STATUS:   Code Status and Medical Interventions:   Ordered at: 06/19/24 1709     Level Of Support Discussed With:    Patient     Code Status (Patient has no pulse and is not breathing):    CPR (Attempt to Resuscitate)     Medical Interventions (Patient has pulse or is breathing):    Full Support       Mali Carrillo MD  06/20/24        Electronically signed by Mali Carrillo MD at 06/20/24 1040       Reji Deutsch MD at 06/19/24 1731          Case discussed with Dr Bravo, Patient is presenting with acute kidney injury in the setting of poor oral intake and severe anemia.    Recs  Agree with shifting agents Insulin, bicarb, Dextrose  Start D5w+150 meq of Nabicarb@ 100cc/hr  2 U PRBC for severe anemia  Recheck BMP in 2 hr   Gonzalez cath  Strict I/O  Full consultation note to follow  Electronically signed by Reji Deutsch MD at 06/19/24 1733          Consult Notes (last 24 hours)        Rosanna Anthony PA at 06/20/24 0917        Consult Orders    1. Inpatient Gastroenterology Consult [[id number]] ordered by Negrita Padilla APRN at 06/19/24 1709                 [facility] Gastroenterology Consult    Referring Provider: Mali Carrillo MD     PCP: Carmen Singh PA-C    Reason for Consultation: GI bleed with melena     Chief complaint: Fatigue, dark stools     History of present illness:    Moon Lerma is a 68 y.o. female who is admitted with acute renal failure and gastrointestinal bleeding with melena.   She resides in [facility] and recently traveled to  [place] last week to visit her daughter.   She has history of breast cancer, diagnosed in 2020 with subsequent mastectomy, chronic hormonal therapy and lymphedema.   She has been on a 20 day antibiotic course for concerns of lower extremity cellulitis (Cephalosporin/Augmentin).       The patient bit her tongue one week ago, resulting in left lateral tongue ulceration, significant oral pain and bleeding.   She then began to notice dark stools with lightheadedness, fatigue.   She denies any abdominal pain, nausea nor vomiting.       She denies any history of previous GI bleeding.  She denies use of NSAIDs.  She has never had a colonoscopy nor EGD.      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       Labs on arrival showed acute renal failure with Cr of 6.77, hyperkalemia (K+ 6) and metabolic acidosis.   H&H was 5.8 and 18.   INR is > 10 , Protime is significantly prolonged at 82.        She denies any abdominal surgeries.    Her daughter is at the bedside and very helpful in assisting with history above.   They do voice concerns that they wish to return to [facility] as soon as possible for extended care if patient can be stabilized.       Allergies:  Patient has no known allergies.    Scheduled Meds:  amLODIPine, 2.5 mg, Oral, Q24H  nebivolol, 2.5 mg, Oral, Q24H  pantoprazole, 40 mg, Intravenous, Q AM  sodium chloride, 10 mL, Intravenous, Q12H  sodium zirconium cyclosilicate, 10 g, Oral, TID  terazosin, 2 mg, Oral, Nightly         Infusions:  sodium bicarbonate 8.4 % 150 mEq in dextrose (D5W) 5 % 1,000 mL infusion (greater than 100 mEq), 150 mEq, Last Rate: 150 mEq (06/20/24 0246)        PRN Meds:    acetaminophen    senna-docusate sodium **AND** polyethylene glycol **AND** bisacodyl **AND** bisacodyl    sodium chloride    sodium chloride    sodium chloride    Home Meds:  Medications Prior to Admission   Medication Sig Dispense Refill Last Dose    ANASTROZOLE PO Take  by mouth.       Calcium Carbonate  "(CALCIUM 500 PO) Take  by mouth.       dabigatran etexilate (PRADAXA) 150 MG capsu Take 1 capsule by mouth 2 (Two) Times a Day.       Dabigatran Etexilate Mesylate (PRADAXA PO) Take 220 mg by mouth Daily.       Fulvestrant (FASLODEX) 250 MG/5ML chemo syringe Inject 10 mL into the appropriate muscle as directed by prescriber Every 30 (Thirty) Days. Last dose 6/6/24       NON FORMULARY MEBEVERINE       NON FORMULARY PROBOL NT       NON FORMULARY Take 5 mg by mouth Daily. amlodipine and nebivolol compound       PRAZOSIN HCL PO Take 2.5 mg by mouth Daily.       rosuvastatin (CRESTOR) 10 MG tablet Take 1 tablet by mouth Every Night.          ROS: Review of Systems   Constitutional:  Positive for fatigue.   HENT:  Positive for mouth sores.    Eyes: Negative.    Respiratory: Negative.     Cardiovascular:  Positive for leg swelling.   Gastrointestinal:  Positive for blood in stool. Negative for abdominal pain, nausea and vomiting.   Endocrine: Negative.    Genitourinary: Negative.    Musculoskeletal: Negative.    Skin:  Positive for wound.   Neurological:  Positive for weakness.   Hematological:  Bruises/bleeds easily.   Psychiatric/Behavioral: Negative.       PAST MED HX:  Past Medical History:   Diagnosis Date    Breast cancer     Clotting disorder     DVT (deep venous thrombosis)     Hypertension      PAST SURG HX:  Past Surgical History:   Procedure Laterality Date    MASTECTOMY, PARTIAL      left breast     FAM HX:  Family History   Problem Relation Age of Onset    No Known Problems Mother     Diabetes Father      SOC HX:  Social History     Socioeconomic History    Marital status:    Tobacco Use    Smoking status: Never    Smokeless tobacco: Never   Vaping Use    Vaping status: Never Used   Substance and Sexual Activity    Alcohol use: Not Currently    Drug use: Never    Sexual activity: Defer     PHYSICAL EXAM  /71   Pulse 74   Temp 97.4 °F (36.3 °C) (Oral)   Resp 16   Ht 152.4 cm (60\")   Wt 128 kg " (282 lb 3 oz)   SpO2 97%   BMI 55.11 kg/m²   Wt Readings from Last 3 Encounters:   06/19/24 128 kg (282 lb 3 oz)   06/19/24 128 kg (283 lb)   ,body mass index is 55.11 kg/m².  Physical Exam  Constitutional:       General: She is not in acute distress.     Appearance: She is obese.   HENT:      Head: Normocephalic and atraumatic.   Cardiovascular:      Rate and Rhythm: Normal rate and regular rhythm.   Pulmonary:      Effort: Pulmonary effort is normal. No respiratory distress.   Abdominal:      General: Bowel sounds are normal. There is no distension.      Palpations: Abdomen is soft.      Tenderness: There is no abdominal tenderness. There is no guarding.   Skin:     General: Skin is warm and dry.   Neurological:      Mental Status: She is alert and oriented to person, place, and time.         Results Review:   I reviewed the patient's new clinical results.    Lab Results   Component Value Date    WBC 10.07 06/20/2024    HGB 8.0 (L) 06/20/2024    HGB 5.8 (C) 06/19/2024    HCT 24.6 (L) 06/20/2024    MCV 91.8 06/20/2024     06/20/2024     Lab Results   Component Value Date    INR >10.00 (C) 06/19/2024     Lab Results   Component Value Date    GLUCOSE 117 (H) 06/20/2024    BUN 85 (H) 06/20/2024    CREATININE 6.35 (H) 06/20/2024    BCR 13.4 06/20/2024     06/20/2024    K 5.6 (H) 06/20/2024    CO2 19.0 (L) 06/20/2024    CALCIUM 9.1 06/20/2024    ALBUMIN 3.2 (L) 06/19/2024    ALKPHOS 59 06/19/2024    BILITOT 0.3 06/19/2024    ALT 22 06/19/2024    AST 30 06/19/2024     CT Abdomen/Pelvis without contrast:  Findings:  Evaluation for GI bleeding is significantly limited in the absence of IV contrast. Accounting for this there is no noncontrast CT evidence of obvious GI hemorrhage.   There are subpleural reticulations at the left lung base, likely chronic interstitial change. Unremarkable appearance of the liver, gallbladder, bile ducts, spleen, pancreas, adrenal glands, kidneys, ureters, bladder, uterus, and  adnexa. No bowel obstruction or inflammatory change of the GI tract. Normal appendix. There is some fluid in the stomach, nonspecific. No abdominopelvic free fluid or fat stranding. No pneumoperitoneum. Unremarkable noncontrast appearance of the vasculature. There are shotty bilateral inguinal lymph nodes and shotty and mildly enlarged bilateral iliac chain lymph nodes, for example a left external iliac chain lymph node measuring 1.4 cm in short axis (series 2 image 105). No enlarged or conspicuous retroperitoneal nodes. Unremarkable appearance of the body wall soft tissues. No acute or suspicious bony findings. There is rightward curvature of the thoracolumbar spine.   IMPRESSION:  Impression:  No acute abdominopelvic findings. No obvious evidence of active GI bleeding noting that assessment is significantly limited in the absence of IV contrast.   Shotty and mildly enlarged bilateral iliac chain lymph nodes, and shotty inguinal lymph nodes, nonspecific. No bulky adenopathy.    ASSESSMENTS/PLANS    Gastrointestinal bleeding with melena   Acute blood loss anemia   Supra therapeutic INR in the setting of Pradaxa use with renal failure.  INR >10.    Recent oral ulceration with bleeding  Acute renal failure  Hyperkalemia   History of breast cancer, on chronic hormonal therapy.     Differential of melena is broad, would include peptic ulcer disease, reflux esophagitis, angiectasias. Possible swallowed blood from recent mouth bleeding.  Bleeding is certainly exacerbated by her elevated INR/Pradaxa use in the setting of renal failure.  She has never had a colonoscopy nor EGD.    She responded well to transfusion overnight with improvement in Hgb to 8.0.     >> Recommend medical management at this time with BID Pantoprazole  >> Discussed EGD and Colonoscopy inpatient if melena persists when INR is corrected.   Can consider this to be performed in [place] if patient remains stable on medical management.     >> Serial H&H  >>  Pradaxa has been held.       I discussed the patient's findings and my recommendations with patient and her daughter whom is at the bedside.      BHARAT Monet  06/20/24  09:17 EDT      Electronically signed by Rosanna Anthony PA at 06/20/24 1007

## 2024-06-20 NOTE — THERAPY WOUND CARE TREATMENT
Acute Care - Wound/Debridement Initial Evaluation  [facility]     Patient Name: Moon Lerma  : 1955  MRN: [id number]  Today's Date: 2024                      Admit Date: 2024    Visit Dx:    ICD-10-CM ICD-9-CM   1. Acute blood loss anemia  D62 285.1   2. Acute renal failure, unspecified acute renal failure type  N17.9 584.9   3. Acute hyperkalemia  E87.5 276.7   4. Acute GI bleeding  K92.2 578.9       Patient Active Problem List   Diagnosis    Renal failure    Acute blood loss anemia    Hyperkalemia    Acute upper GI bleeding    Chronic deep vein thrombosis (DVT)    Primary hypertension    Chronic acquired lymphedema    Chronic cutaneous venous stasis ulcer        Past Medical History:   Diagnosis Date    Breast cancer     Clotting disorder     DVT (deep venous thrombosis)     Hypertension         Past Surgical History:   Procedure Laterality Date    MASTECTOMY, PARTIAL      left breast               Lymphedema       Row Name 24 1310             Lymphedema Edema Assessment    Ptting Edema Category By severity  -KW      Pitting Edema Mild  -KW         Skin Changes/Observations    Location/Assessment Lower Extremity  -KW      Lower Extremity Conditions bilateral:;intact;clean;dry  -KW      Lower Extremity Color/Pigment bilateral:;brawny  -KW         Lymphedema Pulses/Capillary Refill    Lymphedema Pulses/Capillary Refill capillary refill  -KW      Capillary Refill lower extremity capillary refill  -KW      Lower Extremity Capillary Refill right:;left:  -KW         Lymphedema Measurements    Measurement Type(s) Quick Girth  -KW      Quick Girth Areas Lower extremities  -KW         LLE Quick Girth (cm)    Met-heads 25 cm  -KW      Smallest ankle 25 cm  -KW      Largest calf 47 cm  -KW         RLE Quick Girth (cm)    Met-heads 25.5 cm  -KW      Smallest ankle 25 cm  -KW      Largest calf 43 cm  -KW      RLE Quick Girth Total 93.5  -KW         Compression/Skin Care    Compression/Skin Care  skin care;wrapping location  -KW      Skin Care washed/dried;lotion applied  -KW      Wrapping Location lower extremity  -KW      Wrapping Location LE bilateral:;foot to knee  -KW      Wrapping Comments compressiogrip size 4/5 doubled and overlapping for gradient compression  -KW                User Key  (r) = Recorded By, (t) = Taken By, (c) = Cosigned By      Initials Name Provider Type    Celia Arellano PT Physical Therapist                    WOUND DEBRIDEMENT                     PT Assessment (Last 12 Hours)       PT Evaluation and Treatment       Row Name 06/20/24 1310          Physical Therapy Time and Intention    Subjective Information no complaints  -KW     Document Type wound care;evaluation  -KW     Mode of Treatment physical therapy  -KW       Row Name 06/20/24 1310          General Information    Patient Profile Reviewed yes  -KW       Row Name 06/20/24 1310          Plan of Care Review    Plan of Care Reviewed With patient  -KW     Progress no change  -KW     Outcome Evaluation Interpretor Monse [id number] utilized this PTx, via  patient requested her daughter interpret for her. PT wound eval complete. Patient with mild edema in B LE. Patient would benefit from MLW to improve venous return and manage BLE edema/ girth. PT provided education about wraps and signs/symptoms to watch for if wraps too tight. PT also provided education about skin integrity. PT will check back in a couple of days to monitor skin and wraps  -KW       Row Name 06/20/24 1310          Positioning and Restraints    Pre-Treatment Position in bed  -KW     Post Treatment Position bed  -KW     In Bed supine;call light within reach;encouraged to call for assist  -KW               User Key  (r) = Recorded By, (t) = Taken By, (c) = Cosigned By      Initials Name Provider Type    Celia Arellano PT Physical Therapist                  Physical Therapy Education       Title: PT OT SLP Therapies (In Progress)        Topic: Physical Therapy (In Progress)       Point: Mobility training (In Progress)       Learning Progress Summary             Patient Acceptance, E, NR by  at 6/20/2024 1049                         Point: Home exercise program (Not Started)       Learner Progress:  Not documented in this visit.              Point: Body mechanics (Not Started)       Learner Progress:  Not documented in this visit.              Point: Precautions (In Progress)       Learning Progress Summary             Patient Acceptance, E, NR by  at 6/20/2024 1049                                         User Key       Initials Effective Dates Name Provider Type Discipline     07/11/23 -  Carrie Diaz, PT Physical Therapist PT                    Recommendation and Plan  Anticipated Discharge Disposition (PT): home with assist, home with home health  Planned Therapy Interventions (PT): balance training, bed mobility training, gait training, home exercise program, motor coordination training, neuromuscular re-education, patient/family education, postural re-education, ROM (range of motion), stair training, strengthening, stretching, transfer training  Therapy Frequency (PT): daily  Plan of Care Reviewed With: patient   Progress: no change       Progress: no change  Outcome Evaluation: Interpretor Monse [id number] utilized this PTx, via  patient requested her daughter interpret for her. PT wound eval complete. Patient with mild edema in B LE. Patient would benefit from MLW to improve venous return and manage BLE edema/ girth. PT provided education about wraps and signs/symptoms to watch for if wraps too tight. PT also provided education about skin integrity. PT will check back in a couple of days to monitor skin and wraps  Plan of Care Reviewed With: patient            Time Calculation   PT Charges       Row Name 06/20/24 1310 06/20/24 1049          Time Calculation    Start Time 1310  - 0835  -     PT Received On -- 06/20/24   -LM     PT Goal Re-Cert Due Date -- 06/30/24  -LM        Timed Charges    61670 - Gait Training Minutes  -- 8  -LM        Untimed Charges    PT Eval/Re-eval Minutes 35  -KW 46  -LM     Wound Care 90132 Multilayer comp below knee  -KW --     [id number] comp below knee 25  -KW --        Total Minutes    Timed Charges Total Minutes -- 8  -LM     Untimed Charges Total Minutes 60  -KW 46  -LM      Total Minutes 60  -KW 54  -LM               User Key  (r) = Recorded By, (t) = Taken By, (c) = Cosigned By      Initials Name Provider Type    Carrie Gutierrez, PT Physical Therapist    KW Celia Alan, MEÑO Physical Therapist                      Therapy Charges for Today       Code Description Service Date Service Provider Modifiers Qty    54326410480 HC PT RE-EVAL ESTABLISHED PLAN 2 6/20/2024 Celia Alan, PT GP 1    25428332452 HC PT MULTI LAYER COMP SYS BELOW KNEE 6/20/2024 Celia Alan, PT GP 1              PT G-Codes  Outcome Measure Options: AM-PAC 6 Clicks Basic Mobility (PT)  AM-PAC 6 Clicks Score (PT): 18  AM-PAC 6 Clicks Score (OT): 15       Celia Alan PT  6/20/2024

## 2024-06-20 NOTE — CONSULTS
Patient Care Team:  Carmen Singh PA-C as PCP - General (Physician Assistant)    Chief complaint: Acute kidney injury   Poor po intake  DANIEL   Anemia.     History of Present Illness: Ms Moon Wilder is a 69 yo lady with hx of breast cancer on anastrozole, DVT on AC, She presented with worsening DANIEL for few days. On admission found to have severe anemia Hb 5.3 and severe acute kidney injury. According to the patient's daughter patient had lab workup in April 2024 which showed cr ~ 1.4 mg/dl. Patient was treated with 3 week of Abx including ( Cephalosporin and amoxicillin for cellulitis at the start of June 2024. Cr on this admission 6.7 K 6.2 bicarb ~17. Patient is making good urine. CT A/P Didn't show any hydronephrosis.     Review of Systems   Constitutional: Negative.    HENT: Negative.     Respiratory: Negative.     Cardiovascular: Negative.  Positive for leg swelling.   Gastrointestinal: Negative.    Genitourinary: Negative.    Musculoskeletal: Negative.    Neurological: Negative.    Hematological: Negative.    Psychiatric/Behavioral: Negative.          Past Medical History:   Diagnosis Date    Breast cancer     Clotting disorder     DVT (deep venous thrombosis)     Hypertension    ,   Past Surgical History:   Procedure Laterality Date    MASTECTOMY, PARTIAL      left breast   ,   Family History   Problem Relation Age of Onset    No Known Problems Mother     Diabetes Father    ,   Social History     Socioeconomic History    Marital status:    Tobacco Use    Smoking status: Never    Smokeless tobacco: Never   Vaping Use    Vaping status: Never Used   Substance and Sexual Activity    Alcohol use: Not Currently    Drug use: Never    Sexual activity: Defer     E-cigarette/Vaping    E-cigarette/Vaping Use Never User      E-cigarette/Vaping Substances     E-cigarette/Vaping Devices         ,   Medications Prior to Admission   Medication Sig Dispense Refill Last Dose    ANASTROZOLE PO Take  by mouth.  "      Calcium Carbonate (CALCIUM 500 PO) Take  by mouth.       dabigatran etexilate (PRADAXA) 150 MG capsu Take 1 capsule by mouth 2 (Two) Times a Day.       Dabigatran Etexilate Mesylate (PRADAXA PO) Take 220 mg by mouth Daily.       Fulvestrant (FASLODEX) 250 MG/5ML chemo syringe Inject 10 mL into the appropriate muscle as directed by prescriber Every 30 (Thirty) Days. Last dose 6/6/24       NON FORMULARY MEBEVERINE       NON FORMULARY PROBOL NT       NON FORMULARY Take 5 mg by mouth Daily. amlodipine and nebivolol compound       PRAZOSIN HCL PO Take 2.5 mg by mouth Daily.       rosuvastatin (CRESTOR) 10 MG tablet Take 1 tablet by mouth Every Night.      , and Scheduled Meds:  amLODIPine, 2.5 mg, Oral, Q24H  nebivolol, 2.5 mg, Oral, Q24H  pantoprazole, 40 mg, Intravenous, BID AC  sodium chloride, 10 mL, Intravenous, Q12H  sodium zirconium cyclosilicate, 10 g, Oral, TID  terazosin, 2 mg, Oral, Nightly       Objective     Vital Signs  Temp:  [97.2 °F (36.2 °C)-98.3 °F (36.8 °C)] 97.2 °F (36.2 °C)  Heart Rate:  [70-87] 71  Resp:  [16-20] 18  BP: (105-152)/() 105/69    No intake/output data recorded.  I/O last 3 completed shifts:  In: 327.7 [Blood:327.7]  Out: -     Physical Exam    Results Review:    I reviewed the patient's new clinical results.    WBC WBC   Date Value Ref Range Status   06/20/2024 10.07 3.40 - 10.80 10*3/mm3 Final   06/19/2024 12.02 (H) 3.40 - 10.80 10*3/mm3 Final      HGB Hemoglobin   Date Value Ref Range Status   06/20/2024 8.0 (L) 12.0 - 15.9 g/dL Final   06/19/2024 5.8 (C) 12.0 - 15.9 g/dL Final      HCT Hematocrit   Date Value Ref Range Status   06/20/2024 24.6 (L) 34.0 - 46.6 % Final   06/19/2024 18.9 (C) 34.0 - 46.6 % Final      Platlets No results found for: \"LABPLAT\"   MCV MCV   Date Value Ref Range Status   06/20/2024 91.8 79.0 - 97.0 fL Final   06/19/2024 100.0 (H) 79.0 - 97.0 fL Final          Sodium Sodium   Date Value Ref Range Status   06/20/2024 135 (L) 136 - 145 mmol/L Final " "  06/20/2024 137 136 - 145 mmol/L Final   06/19/2024 134 (L) 136 - 145 mmol/L Final      Potassium Potassium   Date Value Ref Range Status   06/20/2024 4.9 3.5 - 5.2 mmol/L Final   06/20/2024 5.6 (H) 3.5 - 5.2 mmol/L Final     Comment:     Slight hemolysis detected by analyzer. Result may be falsely elevated.   06/19/2024 6.0 (H) 3.5 - 5.2 mmol/L Final      Chloride Chloride   Date Value Ref Range Status   06/20/2024 96 (L) 98 - 107 mmol/L Final   06/20/2024 105 98 - 107 mmol/L Final   06/19/2024 103 98 - 107 mmol/L Final      CO2 CO2   Date Value Ref Range Status   06/20/2024 25.0 22.0 - 29.0 mmol/L Final   06/20/2024 19.0 (L) 22.0 - 29.0 mmol/L Final   06/19/2024 17.0 (L) 22.0 - 29.0 mmol/L Final      BUN BUN   Date Value Ref Range Status   06/20/2024 85 (H) 8 - 23 mg/dL Final   06/20/2024 85 (H) 8 - 23 mg/dL Final   06/19/2024 88 (H) 8 - 23 mg/dL Final      Creatinine Creatinine   Date Value Ref Range Status   06/20/2024 6.43 (H) 0.57 - 1.00 mg/dL Final   06/20/2024 6.35 (H) 0.57 - 1.00 mg/dL Final   06/19/2024 6.77 (H) 0.57 - 1.00 mg/dL Final      Calcium Calcium   Date Value Ref Range Status   06/20/2024 9.0 8.6 - 10.5 mg/dL Final   06/20/2024 9.1 8.6 - 10.5 mg/dL Final   06/19/2024 9.5 8.6 - 10.5 mg/dL Final      PO4 No results found for: \"CAPO4\"   Albumin Albumin   Date Value Ref Range Status   06/19/2024 3.2 (L) 3.5 - 5.2 g/dL Final      Magnesium No results found for: \"MG\"   Uric Acid No results found for: \"URICACID\"         Assessment & Plan       Renal failure    Acute blood loss anemia    Hyperkalemia    Acute upper GI bleeding    Chronic deep vein thrombosis (DVT)    Primary hypertension    Chronic acquired lymphedema    Chronic cutaneous venous stasis ulcer      Assessment & Plan:    Acute kidney injury: Last known stable cr ~ 1.4mg/dl. Cr 6.7 mg/dl on admission. UA large alison esterase+ trace protein blood moderate ( Not a clean catch).    Hyperkalemia: In the setting of WALTER:     Met acidosis: Due to " WALTER    Severe anemia: FOBT+. On AC. GI following.     DANIEL: Likely due to severe anemia    Hx of breast cancer: On anastrozole    Volume status: hx of chronic LE edema.     Recs  Acute kidney injury likely severe hemodynamic injury in the setting of poor po intake and anemia s/p 2 unit PRBC. Patient has received IV abx for cellulitis for 3 weeks therefore  possibility of AIN as well.   D/C bicarb gtt  Start NS @ 100cc/hr  Check CK, UPC, Urine eosinophils, c3, and C4  Obtain renal US  Daily labs.   Strict I/o  No indication of dialysis at present.     I discussed the patients findings and my recommendations with patient    Reji Deutsch MD  06/20/24  13:57 EDT

## 2024-06-20 NOTE — THERAPY EVALUATION
Patient Name: Moon Lerma  : 1955    MRN: [id number]                              Today's Date: 2024       Admit Date: 2024    Visit Dx:     ICD-10-CM ICD-9-CM   1. Acute blood loss anemia  D62 285.1   2. Acute renal failure, unspecified acute renal failure type  N17.9 584.9   3. Acute hyperkalemia  E87.5 276.7   4. Acute GI bleeding  K92.2 578.9     Patient Active Problem List   Diagnosis    Renal failure    Acute blood loss anemia    Hyperkalemia    Acute upper GI bleeding    Chronic deep vein thrombosis (DVT)    Primary hypertension    Chronic acquired lymphedema    Chronic cutaneous venous stasis ulcer     Past Medical History:   Diagnosis Date    Breast cancer     Clotting disorder     DVT (deep venous thrombosis)     Hypertension      Past Surgical History:   Procedure Laterality Date    MASTECTOMY, PARTIAL      left breast      General Information       Row Name 24 1039          Physical Therapy Time and Intention    Document Type evaluation  -LM     Mode of Treatment physical therapy  -LM       Row Name 24 1039          General Information    Patient Profile Reviewed yes  -LM     Prior Level of Function independent:;all household mobility;gait;feeding;grooming;bathing;mod assist:;dressing  Uses SC for ambulation  -LM     Existing Precautions/Restrictions fall;other (see comments)  Interpretive cart used to ask pt if she would like family to interpret or if she wants to use offical : Name: Amador Almonte;  #: [id number];  Pt states she would like family to interpret  -LM     Barriers to Rehab previous functional deficit;language barrier  -LM       Row Name 24 1039          Living Environment    People in Home child(jennifer), adult;spouse  Normally lives in [place], but is staying with dtr, son in law, and  while here in the [facility]  -LM       Row Name 24 1039          Home Main Entrance    Number of Stairs, Main Entrance two  -LM     Stair Railings,  Main Entrance none  -LM       Row Name 06/20/24 1039          Stairs Within Home, Primary    Number of Stairs, Within Home, Primary none  -LM       Row Name 06/20/24 1039          Cognition    Orientation Status (Cognition) oriented x 3  -LM       Row Name 06/20/24 1039          Safety Issues, Functional Mobility    Safety Issues Affecting Function (Mobility) insight into deficits/self-awareness;safety precaution awareness  -LM     Impairments Affecting Function (Mobility) balance;endurance/activity tolerance;pain;strength  -LM               User Key  (r) = Recorded By, (t) = Taken By, (c) = Cosigned By      Initials Name Provider Type    Carrie Gutierrez PT Physical Therapist                   Mobility       Row Name 06/20/24 1043          Bed Mobility    Bed Mobility supine-sit  -LM     Supine-Sit [place] (Bed Mobility) minimum assist (75% patient effort);1 person assist;verbal cues  -LM     Assistive Device (Bed Mobility) bed rails;head of bed elevated  -LM       Row Name 06/20/24 1043          Sit-Stand Transfer    Sit-Stand [place] (Transfers) minimum assist (75% patient effort);1 person assist;verbal cues  -LM     Assistive Device (Sit-Stand Transfers) walker, front-wheeled  -LM     Comment, (Sit-Stand Transfer) Stood x 2 - first from EOB; second from low toilet.  -LM       Row Name 06/20/24 1043          Gait/Stairs (Locomotion)    [place] Level (Gait) contact guard;1 person assist  -LM     Assistive Device (Gait) walker, front-wheeled  -LM     Distance in Feet (Gait) 36  -LM     Deviations/Abnormal Patterns (Gait) base of support, wide;shelly decreased;gait speed decreased  -LM     Bilateral Gait Deviations heel strike decreased  -LM     Comment, (Gait/Stairs) No unsteadiness noted.  -LM               User Key  (r) = Recorded By, (t) = Taken By, (c) = Cosigned By      Initials Name Provider Type    Carrie Gutierrez PT Physical Therapist                   Obj/Interventions       Row Name  06/20/24 1044          Range of Motion Comprehensive    General Range of Motion lower extremity range of motion deficits identified  -LM     Comment, General Range of Motion BLEs - Decreased active hip flex - AAROM WFL;  Knees/Ankles AROM WFL  -LM       Row Name 06/20/24 1044          Strength Comprehensive (MMT)    General Manual Muscle Testing (MMT) Assessment lower extremity strength deficits identified  -LM     Comment, General Manual Muscle Testing (MMT) Assessment BLEs - Hip flex - 2/5;  Knee flex/ext - at least 3/5;  Ankle DF - at least 3/5  -LM       Row Name 06/20/24 1044          Balance    Balance Assessment sitting static balance;standing static balance;standing dynamic balance  -LM     Static Sitting Balance standby assist  -LM     Position, Sitting Balance unsupported;sitting edge of bed  -LM     Static Standing Balance contact guard  -LM     Dynamic Standing Balance contact guard  -LM     Position/Device Used, Standing Balance supported;walker, front-wheeled  -LM       Row Name 06/20/24 1044          Sensory Assessment (Somatosensory)    Sensory Assessment (Somatosensory) LE sensation intact  -LM               User Key  (r) = Recorded By, (t) = Taken By, (c) = Cosigned By      Initials Name Provider Type    LM Carrie Diaz, PT Physical Therapist                   Goals/Plan       Row Name 06/20/24 1048          Bed Mobility Goal 1 (PT)    Activity/Assistive Device (Bed Mobility Goal 1, PT) sit to supine/supine to sit  -LM     [place] Level/Cues Needed (Bed Mobility Goal 1, PT) standby assist  -LM     Time Frame (Bed Mobility Goal 1, PT) short term goal (STG);5 days  -LM       Row Name 06/20/24 1048          Transfer Goal 1 (PT)    Activity/Assistive Device (Transfer Goal 1, PT) bed-to-chair/chair-to-bed  -LM     [place] Level/Cues Needed (Transfer Goal 1, PT) modified independence  -LM     Time Frame (Transfer Goal 1, PT) long term goal (LTG);10 days  -LM       Row Name 06/20/24 1048           Gait Training Goal 1 (PT)    Activity/Assistive Device (Gait Training Goal 1, PT) gait (walking locomotion);assistive device use  -LM     [place] Level (Gait Training Goal 1, PT) modified independence  -LM     Distance (Gait Training Goal 1, PT) 150 feet  -LM     Time Frame (Gait Training Goal 1, PT) long term goal (LTG);10 days  -LM       Row Name 06/20/24 1048          Therapy Assessment/Plan (PT)    Planned Therapy Interventions (PT) balance training;bed mobility training;gait training;home exercise program;motor coordination training;neuromuscular re-education;patient/family education;postural re-education;ROM (range of motion);stair training;strengthening;stretching;transfer training  -LM               User Key  (r) = Recorded By, (t) = Taken By, (c) = Cosigned By      Initials Name Provider Type    LM Carrie Diaz, PT Physical Therapist                   Clinical Impression       Row Name 06/20/24 1046          Pain    Pretreatment Pain Rating 5/10  -LM     Posttreatment Pain Rating 5/10  -LM     Pain Location - other (see comments)  tongue  -LM     Pain Intervention(s) Nursing Notified;Medication (See MAR)  RN gave pain meds during treatment  -LM       Row Name 06/20/24 1046          Plan of Care Review    Plan of Care Reviewed With patient;daughter  -LM     Outcome Evaluation PT evaluation completed.  Pt stood with MinAx1 and ambulated 16' + 20' using rw with CGAx1.  Pt presents below baseline function d/t weakness and decreased activity tolerance.  Recommend home with assist and HH PT.  -LM       Row Name 06/20/24 1046          Therapy Assessment/Plan (PT)    Patient/Family Therapy Goals Statement (PT) to be strong enough to go back to [place]  -     Rehab Potential (PT) good, to achieve stated therapy goals  -LM     Criteria for Skilled Interventions Met (PT) yes;meets criteria;skilled treatment is necessary  -LM     Therapy Frequency (PT) daily  -LM     Predicted Duration of Therapy Intervention  (PT) 10 days  -LM       Row Name 06/20/24 1046          Vital Signs    Pre Systolic BP Rehab 115  -LM     Pre Treatment Diastolic BP 71  -LM     Pretreatment Heart Rate (beats/min) 84  -LM     Posttreatment Heart Rate (beats/min) 82  -LM     Pre SpO2 (%) 98  -LM     O2 Delivery Pre Treatment room air  -LM     Post SpO2 (%) 98  -LM     O2 Delivery Post Treatment room air  -LM     Pre Patient Position Supine  -LM     Post Patient Position Sitting  -LM       Row Name 06/20/24 1046          Positioning and Restraints    Pre-Treatment Position in bed  -LM     Post Treatment Position chair  -LM     In Chair sitting;call light within reach;encouraged to call for assist;exit alarm on;waffle cushion;with family/caregiver;notified nsg  -LM               User Key  (r) = Recorded By, (t) = Taken By, (c) = Cosigned By      Initials Name Provider Type    Carrie Gutierrez, PT Physical Therapist                   Outcome Measures       Row Name 06/20/24 1049 06/20/24 0800       How much help from another person do you currently need...    Turning from your back to your side while in flat bed without using bedrails? 3  -LM 3  -SW    Moving from lying on back to sitting on the side of a flat bed without bedrails? 3  -LM 3  -SW    Moving to and from a bed to a chair (including a wheelchair)? 3  -LM 3  -SW    Standing up from a chair using your arms (e.g., wheelchair, bedside chair)? 3  -LM 2  -SW    Climbing 3-5 steps with a railing? 3  -LM 2  -SW    To walk in hospital room? 3  -LM 3  -SW    AM-PAC 6 Clicks Score (PT) 18  -LM 16  -SW    Highest Level of Mobility Goal 6 --> Walk 10 steps or more  -LM 5 --> Static standing  -SW      Row Name 06/20/24 1049 06/20/24 1048       Functional Assessment    Outcome Measure Options AM-PAC 6 Clicks Basic Mobility (PT)  -LM AM-PAC 6 Clicks Daily Activity (OT)  -AC              User Key  (r) = Recorded By, (t) = Taken By, (c) = Cosigned By      Initials Name Provider Type    AC Yenni Aguirre, OT  Occupational Therapist    Carrie Gutierrez, PT Physical Therapist    Nola Benitez, RN Registered Nurse                                 Physical Therapy Education       Title: PT OT SLP Therapies (In Progress)       Topic: Physical Therapy (In Progress)       Point: Mobility training (In Progress)       Learning Progress Summary             Patient Acceptance, E, NR by NORMA at 6/20/2024 1049                         Point: Home exercise program (Not Started)       Learner Progress:  Not documented in this visit.              Point: Body mechanics (Not Started)       Learner Progress:  Not documented in this visit.              Point: Precautions (In Progress)       Learning Progress Summary             Patient Acceptance, E, NR by NORMA at 6/20/2024 1049                                         User Key       Initials Effective Dates Name Provider Type Discipline    NORMA 07/11/23 -  Carrie Diaz, PT Physical Therapist PT                  PT Recommendation and Plan  Planned Therapy Interventions (PT): balance training, bed mobility training, gait training, home exercise program, motor coordination training, neuromuscular re-education, patient/family education, postural re-education, ROM (range of motion), stair training, strengthening, stretching, transfer training  Plan of Care Reviewed With: patient, daughter  Outcome Evaluation: PT evaluation completed.  Pt stood with MinAx1 and ambulated 16' + 20' using rw with CGAx1.  Pt presents below baseline function d/t weakness and decreased activity tolerance.  Recommend home with assist and HH PT.     Time Calculation:   PT Evaluation Complexity  History, PT Evaluation Complexity: 3 or more personal factors and/or comorbidities  Examination of Body Systems (PT Eval Complexity): total of 3 or more elements  Clinical Presentation (PT Evaluation Complexity): stable  Clinical Decision Making (PT Evaluation Complexity): low complexity  Overall Complexity (PT Evaluation  Complexity): low complexity     PT Charges       Row Name 06/20/24 1049             Time Calculation    Start Time 0835  -LM      PT Received On 06/20/24  -LM      PT Goal Re-Cert Due Date 06/30/24  -LM         Timed Charges    42878 - Gait Training Minutes  8  -LM         Untimed Charges    PT Eval/Re-eval Minutes 46  -LM         Total Minutes    Timed Charges Total Minutes 8  -LM      Untimed Charges Total Minutes 46  -LM       Total Minutes 54  -LM                User Key  (r) = Recorded By, (t) = Taken By, (c) = Cosigned By      Initials Name Provider Type    LM Carrie Diaz, PT Physical Therapist                  Therapy Charges for Today       Code Description Service Date Service Provider Modifiers Qty    93406181944 HC GAIT TRAINING EA 15 MIN 6/20/2024 Carrie Diaz, PT GP 1    18024805787 HC PT EVAL LOW COMPLEXITY 4 6/20/2024 Carrie Diaz, PT GP 1            PT G-Codes  Outcome Measure Options: AM-PAC 6 Clicks Basic Mobility (PT)  AM-PAC 6 Clicks Score (PT): 18  AM-PAC 6 Clicks Score (OT): 15  PT Discharge Summary  Anticipated Discharge Disposition (PT): home with assist, home with home health    Carrie Diaz PT  6/20/2024

## 2024-06-20 NOTE — CASE MANAGEMENT/SOCIAL WORK
Discharge Planning Assessment  [facility]     Patient Name: Moon Lerma  MRN: [id number]  Today's Date: 6/20/2024    Admit Date: 6/19/2024    Plan: home   Discharge Needs Assessment       Row Name 06/20/24 1415       Living Environment    People in Home spouse    Current Living Arrangements home    Potentially Unsafe Housing Conditions none    In the past 12 months has the electric, gas, oil, or water company threatened to shut off services in your home? No    Primary Care Provided by self    Provides Primary Care For no one    Family Caregiver if Needed spouse    Quality of Family Relationships helpful;involved;supportive    Able to Return to Prior Arrangements yes       Resource/Environmental Concerns    Resource/Environmental Concerns none       Transportation Needs    In the past 12 months, has lack of transportation kept you from medical appointments or from getting medications? no    In the past 12 months, has lack of transportation kept you from meetings, work, or from getting things needed for daily living? No       Food Insecurity    Within the past 12 months, you worried that your food would run out before you got the money to buy more. Never true    Within the past 12 months, the food you bought just didn't last and you didn't have money to get more. Never true       Transition Planning    Patient/Family Anticipates Transition to home with family    Patient/Family Anticipated Services at Transition none    Transportation Anticipated family or friend will provide       Discharge Needs Assessment    Readmission Within the Last 30 Days no previous admission in last 30 days    Equipment Currently Used at Home cane, straight    Concerns to be Addressed no discharge needs identified;denies needs/concerns at this time                   Discharge Plan       Row Name 06/20/24 1415       Plan    Plan home    Patient/Family in Agreement with Plan yes    Plan Comments Met with patient and family at the bedside  to initiate discharge planning. Patient lives with her  in [place]. She is in the [place] visiting her daughter who lives in [facility]. At baseline, she is independent with ADLs and uses a cane to assist with mobility. Patient denies any home health or home oxygen. Patient has commercial insurance and doesn't have a preferred pharmacy. Patient's plan is to return home to her daughter's house. CM will continue to follow.    Final Discharge Disposition Code 01 - home or self-care                  Continued Care and Services - Admitted Since 6/19/2024    No active coordination exists for this encounter.       Expected Discharge Date and Time       Expected Discharge Date Expected Discharge Time    Jun 22, 2024            Demographic Summary       Row Name 06/20/24 1412       General Information    Admission Type inpatient    Arrived From home    Referral Source physician    Reason for Consult discharge planning    Preferred Language Carolyn    General Information Comments PCP Carmen Singh       Contact Information    Permission Granted to Share Info With family/designee    Contact Information Comments SHOLA ONTIVEROS (Daughter)  [phone] (Home Phone)                   Functional Status       Row Name 06/20/24 1414       Functional Status    Usual Activity Tolerance good    Current Activity Tolerance moderate       Functional Status, IADL    Medications independent    Meal Preparation independent    Housekeeping independent    Laundry independent    Shopping independent       Mental Status    General Appearance WDL WDL       Mental Status Summary    Recent Changes in Mental Status/Cognitive Functioning no changes       Employment/    Employment Status retired                   Psychosocial    No documentation.                  Abuse/Neglect    No documentation.                  Legal    No documentation.                  Substance Abuse    No documentation.                  Patient Forms    No  documentation.                     Afia Roy RN

## 2024-06-20 NOTE — PLAN OF CARE
Goal Outcome Evaluation:  Plan of Care Reviewed With: patient, daughter           Outcome Evaluation: Pt presents below baseline with self care/mobility d/t weakness, decr balance and activity tolerance.  Pt dep LBD,  Min A commode transfer,  CGA to ambulate in room with RW.  OT will follow to advance pt toward PLOF.  Recommend home with assist and home health OT.      Anticipated Discharge Disposition (OT): home with assist, home with home health

## 2024-06-20 NOTE — TELEPHONE ENCOUNTER
Spoke with daughter.  She is at hospital with mother (patient).  She is doing well and getting good care.  They are hoping to stabilize her enough to travel back to [place] where she lives.  She will call if she needs anything further from our office.

## 2024-06-20 NOTE — THERAPY EVALUATION
Patient Name: Moon Lerma  : 1955    MRN: [id number]                              Today's Date: 2024       Admit Date: 2024    Visit Dx:     ICD-10-CM ICD-9-CM   1. Acute blood loss anemia  D62 285.1   2. Acute renal failure, unspecified acute renal failure type  N17.9 584.9   3. Acute hyperkalemia  E87.5 276.7   4. Acute GI bleeding  K92.2 578.9     Patient Active Problem List   Diagnosis    Renal failure    Acute blood loss anemia    Hyperkalemia    Acute upper GI bleeding    Chronic deep vein thrombosis (DVT)    Primary hypertension    Chronic acquired lymphedema    Chronic cutaneous venous stasis ulcer     Past Medical History:   Diagnosis Date    Breast cancer     Clotting disorder     DVT (deep venous thrombosis)     Hypertension      Past Surgical History:   Procedure Laterality Date    MASTECTOMY, PARTIAL      left breast      General Information       Row Name 24 0840          OT Time and Intention    Document Type evaluation  -AC     Mode of Treatment occupational therapy  -AC       Row Name 24 0840          General Information    Patient Profile Reviewed yes  -AC     Prior Level of Function independent:;all household mobility;feeding;grooming;bathing;mod assist:;dressing  uses SPC  -AC     Existing Precautions/Restrictions fall  declined [place] # [id number] ,dtr interprets, pt understands some English  -AC     Barriers to Rehab previous functional deficit;language barrier  -AC       Row Name 24 0840          Occupational Profile    Environmental Supports and Barriers (Occupational Profile) walk in shower  -       Row Name 24 0840          Living Environment    People in Home child(jennifer), adult;spouse  lives in [place], but has been staying with dtr, son-in-law and spouse  -AC       Row Name 24 0840          Home Main Entrance    Number of Stairs, Main Entrance two  -AC     Stair Railings, Main Entrance none  -AC       Row Name 24 0840           Stairs Within Home, Primary    Stairs, Within Home, Primary 1 story  -     Number of Stairs, Within Home, Primary none  -       Row Name 06/20/24 0840          Cognition    Orientation Status (Cognition) oriented x 3  -       Row Name 06/20/24 0840          Safety Issues, Functional Mobility    Safety Issues Affecting Function (Mobility) insight into deficits/self-awareness;safety precaution awareness  -     Impairments Affecting Function (Mobility) balance;endurance/activity tolerance;pain;strength  -               User Key  (r) = Recorded By, (t) = Taken By, (c) = Cosigned By      Initials Name Provider Type     Yenni Aguirre OT Occupational Therapist                     Mobility/ADL's       Row Name 06/20/24 0938          Bed Mobility    Bed Mobility supine-sit  -     Supine-Sit [place] (Bed Mobility) verbal cues;nonverbal cues (demo/gesture);minimum assist (75% patient effort)  -     Assistive Device (Bed Mobility) bed rails;head of bed elevated  -       Row Name 06/20/24 0938          Transfers    Transfers sit-stand transfer;toilet transfer  -       Row Name 06/20/24 0938          Sit-Stand Transfer    Sit-Stand [place] (Transfers) verbal cues;nonverbal cues (demo/gesture);minimum assist (75% patient effort)  -     Assistive Device (Sit-Stand Transfers) walker, front-wheeled  -       Row Name 06/20/24 0938          Toilet Transfer    Type (Toilet Transfer) sit-stand  -     [place] Level (Toilet Transfer) verbal cues;minimum assist (75% patient effort)  -     Assistive Device (Toilet Transfer) commode;grab bars/safety frame;walker, front-wheeled  -       Row Name 06/20/24 0938          Functional Mobility    Functional Mobility- Ind. Level verbal cues required;nonverbal cues required (demo/gesture);contact guard assist  -     Functional Mobility- Device walker, front-wheeled  -     Functional Mobility-Distance (Feet) --  12 + 18  -     Functional Mobility-  Safety Issues step length decreased  -       Row Name 06/20/24 0938          Activities of Daily Living    BADL Assessment/Intervention lower body dressing;grooming;toileting  -       Row Name 06/20/24 0938          Lower Body Dressing Assessment/Training    [place] Level (Lower Body Dressing) don;socks;dependent (less than 25% patient effort)  -     Position (Lower Body Dressing) edge of bed sitting  -AC       Row Name 06/20/24 0938          Grooming Assessment/Training    [place] Level (Grooming) wash face, hands;set up  -     Position (Grooming) supported sitting  -       Row Name 06/20/24 0938          Toileting Assessment/Training    [place] Level (Toileting) perform perineal hygiene;maximum assist (25% patient effort)  -     Assistive Devices (Toileting) commode  -AC     Position (Toileting) supported standing;unsupported sitting  -               User Key  (r) = Recorded By, (t) = Taken By, (c) = Cosigned By      Initials Name Provider Type     Yenni Aguirre, OT Occupational Therapist                   Obj/Interventions       Row Name 06/20/24 0840          Sensory Assessment (Somatosensory)    Sensory Assessment (Somatosensory) UE sensation intact  -[facility] Name 06/20/24 0840          Range of Motion Comprehensive    General Range of Motion bilateral upper extremity ROM WNL  -[facility] Name 06/20/24 0840          Strength Comprehensive (MMT)    Comment, General Manual Muscle Testing (MMT) Assessment BUE grossly 4-/5  -AC       Row Name 06/20/24 0840          Balance    Balance Assessment sitting static balance;standing static balance;standing dynamic balance  -AC     Static Sitting Balance standby assist  -AC     Position, Sitting Balance unsupported;sitting edge of bed  -AC     Static Standing Balance contact guard  -AC     Dynamic Standing Balance contact guard  -AC     Position/Device Used, Standing Balance walker, front-wheeled  -               User Key  (r) =  Recorded By, (t) = Taken By, (c) = Cosigned By      Initials Name Provider Type    AC Yenni Aguirre OT Occupational Therapist                   Goals/Plan       Row Name 06/20/24 1045          Bed Mobility Goal 1 (OT)    Activity/Assistive Device (Bed Mobility Goal 1, OT) sit to supine;supine to sit  -AC     [place] Level/Cues Needed (Bed Mobility Goal 1, OT) contact guard required  -AC     Time Frame (Bed Mobility Goal 1, OT) long term goal (LTG);10 days  -AC     Progress/Outcomes (Bed Mobility Goal 1, OT) new goal;goal ongoing  -AC       Row Name 06/20/24 1045          Transfer Goal 1 (OT)    Activity/Assistive Device (Transfer Goal 1, OT) bed-to-chair/chair-to-bed;toilet  -AC     [place] Level/Cues Needed (Transfer Goal 1, OT) standby assist  -AC     Time Frame (Transfer Goal 1, OT) long term goal (LTG);10 days  -AC     Progress/Outcome (Transfer Goal 1, OT) new goal;goal ongoing  -AC       Row Name 06/20/24 1045          Toileting Goal 1 (OT)    Activity/Device (Toileting Goal 1, OT) adjust/manage clothing;perform perineal hygiene  -AC     [place] Level/Cues Needed (Toileting Goal 1, OT) minimum assist (75% or more patient effort)  -AC     Time Frame (Toileting Goal 1, OT) short term goal (STG);5 days  -AC     Progress/Outcome (Toileting Goal 1, OT) new goal;goal ongoing  -AC       Row Name 06/20/24 1045          Grooming Goal 1 (OT)    Activity/Device (Grooming Goal 1, OT) oral care  -AC     [place] (Grooming Goal 1, OT) standby assist  -AC     Time Frame (Grooming Goal 1, OT) short term goal (STG);5 days  -AC     Strategies/Barriers (Grooming Goal 1, OT) standing sink side  -AC     Progress/Outcome (Grooming Goal 1, OT) new goal;goal ongoing  -AC       Row Name 06/20/24 1045          Therapy Assessment/Plan (OT)    Planned Therapy Interventions (OT) activity tolerance training;BADL retraining;functional balance retraining;occupation/activity based interventions;patient/caregiver  education/training;strengthening exercise;transfer/mobility retraining  -               User Key  (r) = Recorded By, (t) = Taken By, (c) = Cosigned By      Initials Name Provider Type     Yenni Aguirre, OT Occupational Therapist                   Clinical Impression       Row Name 06/20/24 1041          Pain Assessment    Pretreatment Pain Rating 5/10  -     Posttreatment Pain Rating 5/10  -AC     Pain Location - other (see comments)  tongue  -     Pain Intervention(s) Medication (See MAR)  -       Row Name 06/20/24 1041          Plan of Care Review    Plan of Care Reviewed With patient;daughter  -     Outcome Evaluation Pt presents below baseline with self care/mobility d/t weakness, decr balance and activity tolerance.  Pt dep LBD,  Min A commode transfer,  CGA to ambulate in room with RW.  OT will follow to advance pt toward PLOF.  Recommend home with assist and home health OT.  -       Row Name 06/20/24 1041          Therapy Assessment/Plan (OT)    Patient/Family Therapy Goal Statement (OT) pt did not state  -     Rehab Potential (OT) good, to achieve stated therapy goals  -     Criteria for Skilled Therapeutic Interventions Met (OT) yes;skilled treatment is necessary  -     Therapy Frequency (OT) daily  -     Predicted Duration of Therapy Intervention (OT) 10 days  -       Row Name 06/20/24 1041          Therapy Plan Review/Discharge Plan (OT)    Anticipated Discharge Disposition (OT) home with assist;home with home health  -       Row Name 06/20/24 1041          Vital Signs    Pre Systolic BP Rehab 119  -AC     Pre Treatment Diastolic BP 69  -AC     Pretreatment Heart Rate (beats/min) 75  -AC     Posttreatment Heart Rate (beats/min) 81  -AC     Pre SpO2 (%) 98  -AC     O2 Delivery Pre Treatment room air  -AC     O2 Delivery Intra Treatment room air  -AC     Post SpO2 (%) 98  -AC     O2 Delivery Post Treatment room air  -AC     Pre Patient Position Supine  -AC     Post Patient  Position Sitting  -AC       Row Name 06/20/24 1041          Positioning and Restraints    Pre-Treatment Position in bed  -AC     Post Treatment Position chair  -AC     In Chair sitting;call light within reach;encouraged to call for assist;exit alarm on;with family/caregiver;waffle cushion  -               User Key  (r) = Recorded By, (t) = Taken By, (c) = Cosigned By      Initials Name Provider Type    Yenni Worrell, OT Occupational Therapist                   Outcome Measures       Row Name 06/20/24 1048          How much help from another is currently needed...    Putting on and taking off regular lower body clothing? 1  -AC     Bathing (including washing, rinsing, and drying) 2  -AC     Toileting (which includes using toilet bed pan or urinal) 2  -AC     Putting on and taking off regular upper body clothing 3  -AC     Taking care of personal grooming (such as brushing teeth) 3  -AC     Eating meals 4  -AC     AM-PAC 6 Clicks Score (OT) 15  -AC       Row Name 06/20/24 1049 06/20/24 0800       How much help from another person do you currently need...    Turning from your back to your side while in flat bed without using bedrails? 3  -LM 3  -SW    Moving from lying on back to sitting on the side of a flat bed without bedrails? 3  -LM 3  -SW    Moving to and from a bed to a chair (including a wheelchair)? 3  -LM 3  -SW    Standing up from a chair using your arms (e.g., wheelchair, bedside chair)? 3  -LM 2  -SW    Climbing 3-5 steps with a railing? 3  -LM 2  -SW    To walk in hospital room? 3  -LM 3  -SW    AM-PAC 6 Clicks Score (PT) 18  -LM 16  -SW    Highest Level of Mobility Goal 6 --> Walk 10 steps or more  -LM 5 --> Static standing  -SW      Row Name 06/20/24 1049 06/20/24 1048       Functional Assessment    Outcome Measure Options AM-PAC 6 Clicks Basic Mobility (PT)  -LM AM-PAC 6 Clicks Daily Activity (OT)  -AC              User Key  (r) = Recorded By, (t) = Taken By, (c) = Cosigned By      Initials Name  Provider Type    AC Yenni Aguirre, OT Occupational Therapist    Carrie Gutierrez, PT Physical Therapist    Nola Benitez RN Registered Nurse                    Occupational Therapy Education       Title: PT OT SLP Therapies (In Progress)       Topic: Occupational Therapy (In Progress)       Point: ADL training (In Progress)       Description:   Instruct learner(s) on proper safety adaptation and remediation techniques during self care or transfers.   Instruct in proper use of assistive devices.                  Learning Progress Summary             Patient Acceptance, E, NR by  at 6/20/2024 1049   Family Acceptance, E, VU by  at 6/20/2024 1049                         Point: Home exercise program (Not Started)       Description:   Instruct learner(s) on appropriate technique for monitoring, assisting and/or progressing therapeutic exercises/activities.                  Learner Progress:  Not documented in this visit.              Point: Precautions (Not Started)       Description:   Instruct learner(s) on prescribed precautions during self-care and functional transfers.                  Learner Progress:  Not documented in this visit.              Point: Body mechanics (Not Started)       Description:   Instruct learner(s) on proper positioning and spine alignment during self-care, functional mobility activities and/or exercises.                  Learner Progress:  Not documented in this visit.                              User Key       Initials Effective Dates Name Provider Type Discipline     02/03/23 -  Yenni Aguirre, OT Occupational Therapist OT                  OT Recommendation and Plan  Planned Therapy Interventions (OT): activity tolerance training, BADL retraining, functional balance retraining, occupation/activity based interventions, patient/caregiver education/training, strengthening exercise, transfer/mobility retraining  Therapy Frequency (OT): daily  Plan of Care Review  Plan of Care  Reviewed With: patient, daughter  Outcome Evaluation: Pt presents below baseline with self care/mobility d/t weakness, decr balance and activity tolerance.  Pt dep LBD,  Min A commode transfer,  CGA to ambulate in room with RW.  OT will follow to advance pt toward PLOF.  Recommend home with assist and home health OT.     Time Calculation:   Evaluation Complexity (OT)  Review Occupational Profile/Medical/Therapy History Complexity: expanded/moderate complexity  Assessment, Occupational Performance/Identification of Deficit Complexity: 3-5 performance deficits  Clinical Decision Making Complexity (OT): detailed assessment/moderate complexity  Overall Complexity of Evaluation (OT): moderate complexity     Time Calculation- OT       Row Name 06/20/24 1049 06/20/24 0840          Time Calculation- OT    OT Start Time -- 0840  -AC     OT Received On -- 06/20/24  -AC     OT Goal Re-Cert Due Date -- 06/21/24  -AC        Timed Charges    82292 - Gait Training Minutes  8  -LM --     77655 - OT Self Care/Mgmt Minutes -- 10  -AC        Untimed Charges    OT Eval/Re-eval Minutes -- 50  -AC        Total Minutes    Timed Charges Total Minutes 8  -LM 10  -AC     Untimed Charges Total Minutes -- 50  -AC      Total Minutes 8  -LM 60  -AC               User Key  (r) = Recorded By, (t) = Taken By, (c) = Cosigned By      Initials Name Provider Type    AC Yenni Aguirre, OT Occupational Therapist    LM Carrie Diaz, PT Physical Therapist                  Therapy Charges for Today       Code Description Service Date Service Provider Modifiers Qty    [id number]  OT SELF CARE/MGMT/TRAIN EA 15 MIN 6/20/2024 Yenni Aguirre OT GO 1    [id number] HC OT EVAL MOD COMPLEXITY 4 6/20/2024 Yenni Aguirre OT GO 1                 Yenni Aguirre OT  6/20/2024

## 2024-06-20 NOTE — NURSING NOTE
Clean catch urine passed onto days and circumstances of patient explained. The unit may need to look into a care contract because it has taken hours to reason with the family on small items such as not letter in walk to the bathroom and changing into a appropriate hospital gown.

## 2024-06-21 LAB
ANION GAP SERPL CALCULATED.3IONS-SCNC: 10 MMOL/L (ref 5–15)
BUN SERPL-MCNC: 83 MG/DL (ref 8–23)
BUN/CREAT SERPL: 13.1 (ref 7–25)
CALCIUM SPEC-SCNC: 8.3 MG/DL (ref 8.6–10.5)
CHLORIDE SERPL-SCNC: 103 MMOL/L (ref 98–107)
CK SERPL-CCNC: 41 U/L (ref 20–180)
CO2 SERPL-SCNC: 26 MMOL/L (ref 22–29)
CREAT SERPL-MCNC: 6.34 MG/DL (ref 0.57–1)
DEPRECATED RDW RBC AUTO: 59.5 FL (ref 37–54)
EGFRCR SERPLBLD CKD-EPI 2021: 6.7 ML/MIN/1.73
ERYTHROCYTE [DISTWIDTH] IN BLOOD BY AUTOMATED COUNT: 17.9 % (ref 12.3–15.4)
GLUCOSE SERPL-MCNC: 114 MG/DL (ref 65–99)
HCT VFR BLD AUTO: 20.6 % (ref 34–46.6)
HCT VFR BLD AUTO: 25.3 % (ref 34–46.6)
HGB BLD-MCNC: 6.6 G/DL (ref 12–15.9)
HGB BLD-MCNC: 8.4 G/DL (ref 12–15.9)
INR PPP: 8.51 (ref 0.89–1.12)
MAGNESIUM SERPL-MCNC: 2 MG/DL (ref 1.6–2.4)
MCH RBC QN AUTO: 29.3 PG (ref 26.6–33)
MCHC RBC AUTO-ENTMCNC: 32 G/DL (ref 31.5–35.7)
MCV RBC AUTO: 91.6 FL (ref 79–97)
PHOSPHATE SERPL-MCNC: 5.3 MG/DL (ref 2.5–4.5)
PLATELET # BLD AUTO: 177 10*3/MM3 (ref 140–450)
PMV BLD AUTO: 11.4 FL (ref 6–12)
POTASSIUM SERPL-SCNC: 4.5 MMOL/L (ref 3.5–5.2)
PROTHROMBIN TIME: 70.9 SECONDS (ref 12.2–14.5)
RBC # BLD AUTO: 2.25 10*6/MM3 (ref 3.77–5.28)
SODIUM SERPL-SCNC: 139 MMOL/L (ref 136–145)
WBC NRBC COR # BLD AUTO: 8.27 10*3/MM3 (ref 3.4–10.8)

## 2024-06-21 PROCEDURE — 86900 BLOOD TYPING SEROLOGIC ABO: CPT

## 2024-06-21 PROCEDURE — 25010000002 PHYTONADIONE 10 MG/ML SOLUTION: Performed by: STUDENT IN AN ORGANIZED HEALTH CARE EDUCATION/TRAINING PROGRAM

## 2024-06-21 PROCEDURE — P9016 RBC LEUKOCYTES REDUCED: HCPCS

## 2024-06-21 PROCEDURE — 36430 TRANSFUSION BLD/BLD COMPNT: CPT

## 2024-06-21 PROCEDURE — 85610 PROTHROMBIN TIME: CPT | Performed by: STUDENT IN AN ORGANIZED HEALTH CARE EDUCATION/TRAINING PROGRAM

## 2024-06-21 PROCEDURE — 99232 SBSQ HOSP IP/OBS MODERATE 35: CPT | Performed by: STUDENT IN AN ORGANIZED HEALTH CARE EDUCATION/TRAINING PROGRAM

## 2024-06-21 PROCEDURE — 85027 COMPLETE CBC AUTOMATED: CPT | Performed by: STUDENT IN AN ORGANIZED HEALTH CARE EDUCATION/TRAINING PROGRAM

## 2024-06-21 PROCEDURE — 25810000003 SODIUM CHLORIDE 0.9 % SOLUTION: Performed by: INTERNAL MEDICINE

## 2024-06-21 PROCEDURE — 82550 ASSAY OF CK (CPK): CPT | Performed by: INTERNAL MEDICINE

## 2024-06-21 PROCEDURE — 83735 ASSAY OF MAGNESIUM: CPT | Performed by: STUDENT IN AN ORGANIZED HEALTH CARE EDUCATION/TRAINING PROGRAM

## 2024-06-21 PROCEDURE — 84100 ASSAY OF PHOSPHORUS: CPT | Performed by: STUDENT IN AN ORGANIZED HEALTH CARE EDUCATION/TRAINING PROGRAM

## 2024-06-21 PROCEDURE — 85018 HEMOGLOBIN: CPT | Performed by: STUDENT IN AN ORGANIZED HEALTH CARE EDUCATION/TRAINING PROGRAM

## 2024-06-21 PROCEDURE — 85014 HEMATOCRIT: CPT | Performed by: STUDENT IN AN ORGANIZED HEALTH CARE EDUCATION/TRAINING PROGRAM

## 2024-06-21 PROCEDURE — 80048 BASIC METABOLIC PNL TOTAL CA: CPT | Performed by: STUDENT IN AN ORGANIZED HEALTH CARE EDUCATION/TRAINING PROGRAM

## 2024-06-21 RX ORDER — FLUORIDE TOOTHPASTE
15 TOOTHPASTE DENTAL
Status: DISCONTINUED | OUTPATIENT
Start: 2024-06-21 | End: 2024-07-03 | Stop reason: HOSPADM

## 2024-06-21 RX ORDER — PHYTONADIONE 2 MG/ML
5 INJECTION, EMULSION INTRAMUSCULAR; INTRAVENOUS; SUBCUTANEOUS ONCE
Status: COMPLETED | OUTPATIENT
Start: 2024-06-21 | End: 2024-06-21

## 2024-06-21 RX ORDER — DIPHENHYDRAMINE HYDROCHLORIDE AND LIDOCAINE HYDROCHLORIDE AND ALUMINUM HYDROXIDE AND MAGNESIUM HYDRO
5 KIT EVERY 8 HOURS SCHEDULED
Status: DISCONTINUED | OUTPATIENT
Start: 2024-06-21 | End: 2024-07-03 | Stop reason: HOSPADM

## 2024-06-21 RX ADMIN — PHYTONADIONE 5 MG: 10 INJECTION, EMULSION INTRAMUSCULAR; INTRAVENOUS; SUBCUTANEOUS at 17:32

## 2024-06-21 RX ADMIN — Medication 10 ML: at 21:13

## 2024-06-21 RX ADMIN — SODIUM ZIRCONIUM CYCLOSILICATE 10 G: 10 POWDER, FOR SUSPENSION ORAL at 17:32

## 2024-06-21 RX ADMIN — AMLODIPINE BESYLATE 2.5 MG: 2.5 TABLET ORAL at 09:12

## 2024-06-21 RX ADMIN — DIPHENHYDRAMINE HYDROCHLORIDE AND LIDOCAINE HYDROCHLORIDE AND ALUMINUM HYDROXIDE AND MAGNESIUM HYDRO 5 ML: KIT at 21:13

## 2024-06-21 RX ADMIN — DIPHENHYDRAMINE HYDROCHLORIDE AND LIDOCAINE HYDROCHLORIDE AND ALUMINUM HYDROXIDE AND MAGNESIUM HYDRO 5 ML: KIT at 14:28

## 2024-06-21 RX ADMIN — PANTOPRAZOLE SODIUM 40 MG: 40 INJECTION, POWDER, FOR SOLUTION INTRAVENOUS at 09:12

## 2024-06-21 RX ADMIN — SODIUM CHLORIDE 100 ML/HR: 9 INJECTION, SOLUTION INTRAVENOUS at 01:00

## 2024-06-21 RX ADMIN — ACETAMINOPHEN 650 MG: 325 TABLET, FILM COATED ORAL at 12:31

## 2024-06-21 RX ADMIN — PANTOPRAZOLE SODIUM 40 MG: 40 INJECTION, POWDER, FOR SOLUTION INTRAVENOUS at 17:33

## 2024-06-21 RX ADMIN — TERAZOSIN HYDROCHLORIDE 2 MG: 2 CAPSULE ORAL at 21:13

## 2024-06-21 RX ADMIN — SODIUM ZIRCONIUM CYCLOSILICATE 10 G: 10 POWDER, FOR SUSPENSION ORAL at 09:12

## 2024-06-21 RX ADMIN — SODIUM ZIRCONIUM CYCLOSILICATE 10 G: 10 POWDER, FOR SUSPENSION ORAL at 21:12

## 2024-06-21 RX ADMIN — NEBIVOLOL 2.5 MG: 2.5 TABLET ORAL at 09:12

## 2024-06-21 RX ADMIN — Medication 10 ML: at 09:12

## 2024-06-21 NOTE — PROGRESS NOTES
[facility] Medicine Services  PROGRESS NOTE    Patient Name: Moon Lerma  : 1955  MRN: [id number]    Date of Admission: 2024  Primary Care Physician: Carmen Singh PA-C    Subjective   Subjective     CC:  Acute renal failure    HPI:  One black stool overnight. No other bleeding. Has tongue ulcers that make it painful to eat.  No vomiting.  No shortness of breath.  Patient's daughter at bedside.    Objective   Objective     Vital Signs:   Temp:  [96.8 °F (36 °C)-97.6 °F (36.4 °C)] 97.5 °F (36.4 °C)  Heart Rate:  [76-86] 76  Resp:  [16] 16  BP: (107-133)/(69-86) 113/75     Physical Exam:  Constitutional: No acute distress, awake, alert, obese, sitting up  HENT: NCAT, mucous membranes moist  Respiratory: Clear to auscultation bilaterally, respiratory effort normal   Cardiovascular: RRR, no murmurs, rubs, or gallops  Gastrointestinal: Positive bowel sounds, soft, nontender, nondistended  Musculoskeletal: No bilateral ankle edema  Psychiatric: Appropriate affect, cooperative  Neurologic: PERRL, symmetric facies, moves all extremities, speech clear    Results Reviewed:  LAB RESULTS:      Lab 24  0507 24  1815 24  0035 24  1328   WBC 8.27  --  10.07 12.02*   HEMOGLOBIN 6.6* 8.0* 8.0* 5.8*   HEMATOCRIT 20.6* 24.3* 24.6* 18.9*   PLATELETS 177  --  209 232   NEUTROS ABS  --   --  7.17* 9.14*   IMMATURE GRANS (ABS)  --   --  0.07* 0.12*   LYMPHS ABS  --   --  2.02 1.81   MONOS ABS  --   --  0.57 0.70   EOS ABS  --   --  0.22 0.22   MCV 91.6  --  91.8 100.0*   PROTIME 70.9*  --   --  82.1*         Lab 24  0507 24  1325 24  0035 24  2219 24  1328   SODIUM 139 135* 137  --  134*   POTASSIUM 4.5 4.9 5.6*  --  6.0*   CHLORIDE 103 96* 105  --  103   CO2 26.0 25.0 19.0*  --  17.0*   ANION GAP 10.0 14.0 13.0  --  14.0   BUN 83* 85* 85*  --  88*   CREATININE 6.34* 6.43* 6.35*  --  6.77*   EGFR 6.7* 6.6* 6.7*  --  6.2*   GLUCOSE  114* 172* 117*  --  125*   CALCIUM 8.3* 9.0 9.1  --  9.5   MAGNESIUM 2.0  --   --   --   --    PHOSPHORUS 5.3*  --   --   --   --    HEMOGLOBIN A1C  --   --   --  5.70*  --          Lab 06/19/24  1328   TOTAL PROTEIN 7.3   ALBUMIN 3.2*   GLOBULIN 4.1   ALT (SGPT) 22   AST (SGOT) 30   BILIRUBIN 0.3   ALK PHOS 59         Lab 06/21/24  0507 06/19/24  1328   PROBNP  --  3,437.0*   HSTROP T  --  26*   PROTIME 70.9* 82.1*   INR 8.51* >10.00*             Lab 06/19/24  1422   ABO TYPING O   RH TYPING Positive   ANTIBODY SCREEN Negative         Brief Urine Lab Results  (Last result in the past 365 days)        Color   Clarity   Blood   Leuk Est   Nitrite   Protein   CREAT   Urine HCG        06/20/24 1015             23.5                 Microbiology Results Abnormal       Procedure Component Value - Date/Time    Eosinophil Smear - Urine, Urine, Clean Catch [[id number]]  (Normal) Collected: 06/20/24 1014    Lab Status: Final result Specimen: Urine, Clean Catch Updated: 06/20/24 1412     Eosinophil Smear 0 % EOS/100 Cells     Narrative:      No eosinophil seen            US Renal Limited    Result Date: 6/20/2024  US RENAL LIMITED Date of Exam: 6/20/2024 7:15 PM EDT Indication: WALTER. Comparison: No comparisons available. Technique: Grayscale and color Doppler ultrasound evaluation of the kidneys and urinary bladder was performed. Findings: Limited study due to overlying bowel gas. Right Kidney: The right kidney measures 9.1 x 4.4 x 4.6 cm. Renal parenchyma is within normal limits in echogenicity. There is borderline cortical thinning measuring 1 cm. There is no hydronephrosis. No obvious renal calculi. No evidence of solid renal mass. Left Kidney: The left kidney measures 8.6 x 4.5 x 4.5 cm. Renal parenchyma is within normal limits in echogenicity. There is mild left-sided cortical thinning measuring 0.8 cm. There is no hydronephrosis. No obvious renal calculi. No evidence of solid renal mass. Bladder: The urinary bladder  appears unremarkable.     Impression: Impression: No evidence of hydronephrosis. Borderline cortical thinning of the right kidney and mild cortical thinning of the left kidney. Correlate for medical renal disease. Electronically Signed: Andrew Quinonez MD  6/20/2024 9:00 PM EDT  Workstation ID: [id number]    CT Abdomen Pelvis Without Contrast    Result Date: 6/19/2024  CT ABDOMEN PELVIS WO CONTRAST Date of Exam: 6/19/2024 2:45 PM EDT Indication: gi bleed. Comparison: None available. Technique: Axial CT images were obtained of the abdomen and pelvis without the administration of contrast. Reconstructed coronal and sagittal images were also obtained. Automated exposure control and iterative construction methods were used. Findings: Evaluation for GI bleeding is significantly limited in the absence of IV contrast. Accounting for this there is no noncontrast CT evidence of obvious GI hemorrhage. There are subpleural reticulations at the left lung base, likely chronic interstitial change. Unremarkable appearance of the liver, gallbladder, bile ducts, spleen, pancreas, adrenal glands, kidneys, ureters, bladder, uterus, and adnexa. No bowel obstruction or inflammatory change of the GI tract. Normal appendix. There is some fluid in the stomach, nonspecific. No abdominopelvic free fluid or fat stranding. No pneumoperitoneum. Unremarkable noncontrast appearance of the vasculature. There are shotty bilateral inguinal lymph nodes and shotty and mildly enlarged bilateral iliac chain lymph nodes, for example a left external iliac chain lymph node measuring 1.4 cm in short axis (series 2 image 105). No enlarged or conspicuous retroperitoneal nodes. Unremarkable appearance of the body wall soft tissues. No acute or suspicious bony findings. There is rightward curvature of the thoracolumbar spine.     Impression: Impression: No acute abdominopelvic findings. No obvious evidence of active GI bleeding noting that assessment is  significantly limited in the absence of IV contrast. Shotty and mildly enlarged bilateral iliac chain lymph nodes, and shotty inguinal lymph nodes, nonspecific. No bulky adenopathy. Electronically Signed: Joshua Meeks MD  6/19/2024 2:59 PM EDT  Workstation ID: [id number]    XR Chest 1 View    Result Date: 6/19/2024  XR CHEST 1 VW Date of Exam: 6/19/2024 1:43 PM EDT Indication: SOA triage protocol Comparison: None available. Findings: Exam is limited by obesity and low lung volumes. The left lower lobe is poorly penetrated. The right lung appears clear. Heart and pulmonary vessels appear within normal limits for technique. There are no definite effusions.     Impression: Impression: Suboptimal exam. No obvious acute abnormality. Electronically Signed: Piper Saldana MD  6/19/2024 2:46 PM EDT  Workstation ID: [id number]         Current medications:  Scheduled Meds:amLODIPine, 2.5 mg, Oral, Q24H  First Mouthwash (Magic Mouthwash), 5 mL, Swish & Spit, Q8H  nebivolol, 2.5 mg, Oral, Q24H  pantoprazole, 40 mg, Intravenous, BID AC  sodium chloride, 10 mL, Intravenous, Q12H  sodium zirconium cyclosilicate, 10 g, Oral, TID  terazosin, 2 mg, Oral, Nightly      Continuous Infusions:sodium chloride, 100 mL/hr, Last Rate: 100 mL/hr (06/21/24 0100)      PRN Meds:.•  acetaminophen  •  Biotene dry mouth  •  senna-docusate sodium **AND** polyethylene glycol **AND** bisacodyl **AND** bisacodyl  •  sodium chloride  •  sodium chloride  •  sodium chloride    Assessment & Plan   Assessment & Plan     Active Hospital Problems    Diagnosis  POA   • **Renal failure [N19]  Yes   • Acute blood loss anemia [D62]  Unknown   • Hyperkalemia [E87.5]  Unknown   • Acute upper GI bleeding [K92.2]  Unknown   • Chronic deep vein thrombosis (DVT) [I82.509]  Unknown   • Primary hypertension [I10]  Unknown   • Chronic acquired lymphedema [I89.0]  Unknown   • Chronic cutaneous venous stasis ulcer [I83.009, L97.909]  Unknown      Resolved Hospital Problems    No resolved problems to display.        Brief Hospital Course to date:  Moon Lerma is a 68 y.o. female on chronic pradaxa for DVT, reported Cr of 1.4 in April, recent prolonged abx for LE edema and weeping; she presents w/ a Cr of 6.77 and K of 6.0, INR is >10, Hgb of 5.8, and melena; suspect high serum levels of pradaxa from renal failure, which precipitated GIB.      *Of note, family is concerned about patient's lack of insurance coverage while in the [place], they would like her stabilized enough to make the trip back to [facility] and complete the rest of her care there, as much as is reasonable     Acute blood loss anemia  Acute upper GIB  --H/H of 5.8/ 18.9 in ED, hemoglobin in April of 9.8 per records   --s/p 4 units RBC; apparently the daughter refused the O- blood and demanded O+ blood this AM  --GI consulted  -- If melena persists once INR is corrected, then may perform EGD/colonoscopy inpatient.  Can consider this to be performed in [facility] if patient stable with medical management  --holding Pradaxa  --PPI  --Patient does not qualify for Pradaxa reversal based on comorbid conditions, does not have critical bleeding at this time  --discussed with GI, will trial PO vitamin K x1     Acute renal failure  Hyperkalemia  Metabolic acidosis   --creatinine in April was 1.4  --creatinine in ED of 6.77 likely from decreased oral intake and anemia  --Nephrology consulted  --Potassium of 6.0, treated with Insulin/ Bicarb/ Dextrose   --started on Bicarb drip, transitioned to NS  --continue TID lokelma     Chronic LLE DVT  --holding Pradaxa for above  --my practice partner discussed with Pharmacy, doesn't need DVT ppx at this time due to long half life of Pradaxa and severe renal disease; no SCDs given DVT    Mouth pain due to tongue ulcerations  --Magic mouthwash     HTN  --cont Nebivolol and Amlodipine daily   --consider stopping Amlodipine for BLE edema   --cont Prazosin nightly      Chronic venous stasis ulcer  Chronic  lymphedema  --PT wound for compression wraps and toe ulcerations      Oral ulceration --magic mouthwash   History of breast cancer 2020    Expected Discharge Location and Transportation: home  Expected Discharge   Expected Discharge Date: 6/24/2024; Expected Discharge Time:      VTE Prophylaxis:  Mechanical VTE prophylaxis orders are present.         AM-PAC 6 Clicks Score (PT): 18 (06/21/24 0800)    CODE STATUS:   Code Status and Medical Interventions:   Ordered at: 06/19/24 5285     Level Of Support Discussed With:    Patient     Code Status (Patient has no pulse and is not breathing):    CPR (Attempt to Resuscitate)     Medical Interventions (Patient has pulse or is breathing):    Full Support       Mali Carrillo MD  06/21/24

## 2024-06-21 NOTE — PLAN OF CARE
Problem: Adult Inpatient Plan of Care  Goal: Plan of Care Review  Outcome: Ongoing, Progressing  Goal: Patient-Specific Goal (Individualized)  Outcome: Ongoing, Progressing  Goal: Absence of Hospital-Acquired Illness or Injury  Outcome: Ongoing, Progressing  Intervention: Identify and Manage Fall Risk  Recent Flowsheet Documentation  Taken 6/21/2024 1200 by Nola Meier RN  Safety Promotion/Fall Prevention:   assistive device/personal items within reach   activity supervised   clutter free environment maintained   toileting scheduled   safety round/check completed   room organization consistent  Taken 6/21/2024 1000 by Nola Meier RN  Safety Promotion/Fall Prevention:   activity supervised   assistive device/personal items within reach   clutter free environment maintained   toileting scheduled   safety round/check completed   room organization consistent  Taken 6/21/2024 0800 by Nola Meier RN  Safety Promotion/Fall Prevention:   activity supervised   assistive device/personal items within reach   clutter free environment maintained   toileting scheduled   safety round/check completed   room organization consistent  Intervention: Prevent Skin Injury  Recent Flowsheet Documentation  Taken 6/21/2024 1200 by Nola Meier RN  Body Position:   weight shifting   sitting up in bed  Taken 6/21/2024 1000 by Nola Meier RN  Body Position: supine, legs elevated  Skin Protection:   adhesive use limited   tubing/devices free from skin contact   transparent dressing maintained   skin-to-skin areas padded   skin-to-device areas padded  Taken 6/21/2024 0800 by Nola Meier RN  Body Position: supine, legs elevated  Skin Protection:   adhesive use limited   tubing/devices free from skin contact   transparent dressing maintained   skin-to-skin areas padded   skin-to-device areas padded  Intervention: Prevent and Manage VTE (Venous Thromboembolism) Risk  Recent Flowsheet Documentation  Taken  6/21/2024 1200 by Nola Meier RN  Activity Management: activity encouraged  Taken 6/21/2024 1000 by Nola Meier RN  Activity Management:   ambulated in room   back to bed  Taken 6/21/2024 0800 by Nola Meier RN  Activity Management: up in chair  Range of Motion: active ROM (range of motion) encouraged  Goal: Optimal Comfort and Wellbeing  Outcome: Ongoing, Progressing  Goal: Readiness for Transition of Care  Outcome: Ongoing, Progressing     Problem: Skin Injury Risk Increased  Goal: Skin Health and Integrity  Outcome: Ongoing, Progressing  Intervention: Optimize Skin Protection  Recent Flowsheet Documentation  Taken 6/21/2024 1200 by Nola Meier RN  Head of Bed (HOB) Positioning: HOB elevated  Taken 6/21/2024 1000 by Nola Meier RN  Pressure Reduction Techniques:   frequent weight shift encouraged   heels elevated off bed   pressure points protected   weight shift assistance provided  Head of Bed (HOB) Positioning: HOB elevated  Pressure Reduction Devices:   specialty bed utilized   positioning supports utilized   heel offloading device utilized   foam padding utilized  Skin Protection:   adhesive use limited   tubing/devices free from skin contact   transparent dressing maintained   skin-to-skin areas padded   skin-to-device areas padded  Taken 6/21/2024 0800 by Nola Meier RN  Pressure Reduction Techniques:   frequent weight shift encouraged   heels elevated off bed   pressure points protected   weight shift assistance provided  Head of Bed (HOB) Positioning: HOB elevated  Pressure Reduction Devices:   positioning supports utilized   heel offloading device utilized   foam padding utilized   specialty bed utilized  Skin Protection:   adhesive use limited   tubing/devices free from skin contact   transparent dressing maintained   skin-to-skin areas padded   skin-to-device areas padded     Problem: Fall Injury Risk  Goal: Absence of Fall and Fall-Related Injury  Outcome: Ongoing,  Progressing  Intervention: Promote Injury-Free Environment  Recent Flowsheet Documentation  Taken 6/21/2024 1200 by Nola Meier, RN  Safety Promotion/Fall Prevention:   assistive device/personal items within reach   activity supervised   clutter free environment maintained   toileting scheduled   safety round/check completed   room organization consistent  Taken 6/21/2024 1000 by Nola Meier, RN  Safety Promotion/Fall Prevention:   activity supervised   assistive device/personal items within reach   clutter free environment maintained   toileting scheduled   safety round/check completed   room organization consistent  Taken 6/21/2024 0800 by Nola Meier, RN  Safety Promotion/Fall Prevention:   activity supervised   assistive device/personal items within reach   clutter free environment maintained   toileting scheduled   safety round/check completed   room organization consistent   Goal Outcome Evaluation:

## 2024-06-21 NOTE — CASE MANAGEMENT/SOCIAL WORK
Continued Stay Note  [facility]     Patient Name: Moon Lerma  MRN: [id number]  Today's Date: 6/21/2024    Admit Date: 6/19/2024    Plan: home   Discharge Plan       Row Name 06/21/24 9321       Plan    Plan Comments Met with patient and family at the bedside to discuss discharge plan. PT and OT have recommended home health at discharge, however, patient's insurance is out of network with all surrounding home health agencies. Patient's plan is home with daughter at discharge. CM will continue to follow.    Final Discharge Disposition Code 01 - home or self-care                   Discharge Codes    No documentation.                 Expected Discharge Date and Time       Expected Discharge Date Expected Discharge Time    Jun 24, 2024               Afia Roy RN

## 2024-06-21 NOTE — PLAN OF CARE
Problem: Adult Inpatient Plan of Care  Goal: Plan of Care Review  Outcome: Ongoing, Progressing  Goal: Patient-Specific Goal (Individualized)  Outcome: Ongoing, Progressing  Goal: Absence of Hospital-Acquired Illness or Injury  Outcome: Ongoing, Progressing  Intervention: Identify and Manage Fall Risk  Description: Perform standard risk assessment on admission using a validated tool or comprehensive approach appropriate to the patient; reassess fall risk frequently, with change in status or transfer to another level of care.  Communicate fall injury risk to interprofessional healthcare team.  Determine need for increased observation, equipment and environmental modification, such as low bed, signage and supportive, nonskid footwear.  Adjust safety measures to individual developmental age, stage and identified risk factors.  Reinforce the importance of safety and physical activity with patient and family.  Perform regular intentional rounding to assess need for position change, pain assessment and personal needs, including assistance with toileting.  Recent Flowsheet Documentation  Taken 6/21/2024 0400 by Jill Lamar, RN  Safety Promotion/Fall Prevention:   activity supervised   assistive device/personal items within reach   clutter free environment maintained   fall prevention program maintained   lighting adjusted   mobility aid in reach   nonskid shoes/slippers when out of bed   room organization consistent   safety round/check completed  Taken 6/21/2024 0200 by Jill Lamar, RN  Safety Promotion/Fall Prevention:   activity supervised   assistive device/personal items within reach   clutter free environment maintained   fall prevention program maintained   lighting adjusted   mobility aid in reach   nonskid shoes/slippers when out of bed   room organization consistent   safety round/check completed  Taken 6/21/2024 0000 by Jill Lamar, RN  Safety Promotion/Fall Prevention:   activity supervised    assistive device/personal items within reach   clutter free environment maintained   fall prevention program maintained   lighting adjusted   mobility aid in reach   nonskid shoes/slippers when out of bed   room organization consistent   safety round/check completed  Taken 6/20/2024 2200 by Jill Lamar RN  Safety Promotion/Fall Prevention:   activity supervised   assistive device/personal items within reach   clutter free environment maintained   fall prevention program maintained   lighting adjusted   mobility aid in reach   nonskid shoes/slippers when out of bed   room organization consistent   safety round/check completed  Taken 6/20/2024 2040 by Jill Lamar RN  Safety Promotion/Fall Prevention:   activity supervised   clutter free environment maintained   assistive device/personal items within reach   fall prevention program maintained   lighting adjusted   mobility aid in reach   nonskid shoes/slippers when out of bed   room organization consistent   safety round/check completed  Intervention: Prevent Skin Injury  Description: Perform a screening for skin injury risk, such as pressure or moisture associated skin damage on admission and at regular intervals throughout hospital stay.  Keep all areas of skin (especially folds) clean and dry.  Maintain adequate skin hydration.  Relieve and redistribute pressure and protect bony prominences; implement measures based on patient-specific risk factors.  Match turning and repositioning schedule to clinical condition.  Encourage weight shift frequently; assist with reposition if unable to complete independently.  Float heels off bed; avoid pressure on the Achilles tendon.  Keep skin free from extended contact with medical devices.  Encourage functional activity and mobility, as early as tolerated.  Use aids (e.g., slide boards, mechanical lift) during transfer.  Recent Flowsheet Documentation  Taken 6/21/2024 0400 by Jill Lamar, RN  Body Position: position  changed independently  Skin Protection:   adhesive use limited   tubing/devices free from skin contact   transparent dressing maintained   skin-to-skin areas padded   skin-to-device areas padded   skin sealant/moisture barrier applied   incontinence pads utilized  Taken 6/21/2024 0200 by Jill Lamar RN  Body Position: position changed independently  Skin Protection:   adhesive use limited   tubing/devices free from skin contact   transparent dressing maintained   skin-to-skin areas padded   skin-to-device areas padded   skin sealant/moisture barrier applied   incontinence pads utilized   silicone foam dressing in place  Taken 6/21/2024 0000 by Jill Lamar RN  Body Position: position changed independently  Skin Protection:   adhesive use limited   tubing/devices free from skin contact   transparent dressing maintained   skin-to-skin areas padded   skin-to-device areas padded   skin sealant/moisture barrier applied   incontinence pads utilized  Taken 6/20/2024 2200 by Jill Lamar RN  Body Position: position changed independently  Skin Protection:   adhesive use limited   tubing/devices free from skin contact   transparent dressing maintained   skin-to-skin areas padded   skin-to-device areas padded   skin sealant/moisture barrier applied   incontinence pads utilized  Taken 6/20/2024 2040 by Jill Lamar RN  Body Position: position changed independently  Skin Protection:   adhesive use limited   tubing/devices free from skin contact   transparent dressing maintained   skin-to-skin areas padded   skin-to-device areas padded   skin sealant/moisture barrier applied   silicone foam dressing in place   incontinence pads utilized  Intervention: Prevent and Manage VTE (Venous Thromboembolism) Risk  Description: Assess for VTE (venous thromboembolism) risk.  Encourage and assist with early ambulation.  Initiate and maintain compression or other therapy, as indicated, based on identified risk in accordance with  organizational protocol and provider order.  Encourage both active and passive leg exercises while in bed, if unable to ambulate.  Recent Flowsheet Documentation  Taken 6/21/2024 0400 by Jill Lamar RN  Activity Management: activity encouraged  Taken 6/21/2024 0200 by iJll Lamar RN  Activity Management: activity encouraged  Taken 6/21/2024 0000 by Jill Lamar RN  Activity Management: activity minimized  Taken 6/20/2024 2200 by Jill Lamar RN  Activity Management: activity encouraged  Taken 6/20/2024 2040 by Jill Lamar RN  Activity Management: up in chair  Intervention: Prevent Infection  Description: Maintain skin and mucous membrane integrity; promote hand, oral and pulmonary hygiene.  Optimize fluid balance, nutrition, sleep and glycemic control to maximize infection resistance.  Identify potential sources of infection early to prevent or mitigate progression of infection (e.g., wound, lines, devices).  Evaluate ongoing need for invasive devices; remove promptly when no longer indicated.  Recent Flowsheet Documentation  Taken 6/21/2024 0400 by Jill Lamar RN  Infection Prevention:   environmental surveillance performed   equipment surfaces disinfected   hand hygiene promoted   personal protective equipment utilized   rest/sleep promoted  Taken 6/20/2024 2040 by Jill Lamar RN  Infection Prevention:   environmental surveillance performed   equipment surfaces disinfected   hand hygiene promoted   personal protective equipment utilized   rest/sleep promoted  Goal: Optimal Comfort and Wellbeing  Outcome: Ongoing, Progressing  Intervention: Provide Person-Centered Care  Description: Use a family-focused approach to care.  Develop trust and rapport by proactively providing information, encouraging questions, addressing concerns and offering reassurance.  Acknowledge emotional response to hospitalization.  Recognize and utilize personal coping strategies.  Honor spiritual and cultural  preferences.  Recent Flowsheet Documentation  Taken 6/20/2024 2200 by Jill Lamar RN  Trust Relationship/Rapport:   care explained   choices provided   emotional support provided   empathic listening provided   questions answered   thoughts/feelings acknowledged  Taken 6/20/2024 2040 by Jill Lamar RN  Trust Relationship/Rapport:   care explained   choices provided   emotional support provided   empathic listening provided   questions answered   questions encouraged   thoughts/feelings acknowledged  Goal: Readiness for Transition of Care  Outcome: Ongoing, Progressing     Problem: Skin Injury Risk Increased  Goal: Skin Health and Integrity  Outcome: Ongoing, Progressing  Intervention: Optimize Skin Protection  Description: Perform a full pressure injury risk assessment, as indicated by screening, upon admission to care unit.  Reassess skin (injury risk, full inspection) frequently (e.g., scheduled interval, with change in condition) to provide optimal early detection and prevention.  Maintain adequate tissue perfusion (e.g., encourage fluid balance; avoid crossing legs, constrictive clothing or devices) to promote tissue oxygenation.  Maintain head of bed at lowest degree of elevation tolerated, considering medical condition and other restrictions.  Avoid positioning onto an area that remains reddened.  Minimize incontinence and moisture (e.g., toileting schedule; moisture-wicking pad, diaper or incontinence collection device; skin moisture barrier).  Cleanse skin promptly and gently when soiled utilizing a pH-balanced cleanser.  Relieve and redistribute pressure (e.g., scheduled position changes, weight shifts, use of support surface, medical device repositioning, protective dressing application, use of positioning device, microclimate control, use of pressure-injury-monitor  Encourage increased activity, such as sitting in a chair at the bedside or early mobilization, when able to tolerate.  Recent  Flowsheet Documentation  Taken 6/21/2024 0400 by Jill Lamar RN  Pressure Reduction Techniques:   frequent weight shift encouraged   weight shift assistance provided   heels elevated off bed   positioned off wounds   pressure points protected  Head of Bed (HOB) Positioning: HOB at 30-45 degrees  Pressure Reduction Devices:   pressure-redistributing mattress utilized   positioning supports utilized   heel offloading device utilized  Skin Protection:   adhesive use limited   tubing/devices free from skin contact   transparent dressing maintained   skin-to-skin areas padded   skin-to-device areas padded   skin sealant/moisture barrier applied   incontinence pads utilized  Taken 6/21/2024 0200 by Jill Lamar RN  Pressure Reduction Techniques:   frequent weight shift encouraged   weight shift assistance provided   heels elevated off bed   positioned off wounds   pressure points protected  Head of Bed (HOB) Positioning: HOB at 30-45 degrees  Pressure Reduction Devices:   pressure-redistributing mattress utilized   positioning supports utilized   heel offloading device utilized  Skin Protection:   adhesive use limited   tubing/devices free from skin contact   transparent dressing maintained   skin-to-skin areas padded   skin-to-device areas padded   skin sealant/moisture barrier applied   incontinence pads utilized   silicone foam dressing in place  Taken 6/21/2024 0000 by Jill Lamar RN  Pressure Reduction Techniques:   frequent weight shift encouraged   weight shift assistance provided   heels elevated off bed   positioned off wounds   pressure points protected  Head of Bed (HOB) Positioning: HOB at 30-45 degrees  Pressure Reduction Devices:   pressure-redistributing mattress utilized   positioning supports utilized   heel offloading device utilized  Skin Protection:   adhesive use limited   tubing/devices free from skin contact   transparent dressing maintained   skin-to-skin areas padded   skin-to-device  areas padded   skin sealant/moisture barrier applied   incontinence pads utilized  Taken 6/20/2024 2200 by Jill Lamar RN  Pressure Reduction Techniques:   frequent weight shift encouraged   weight shift assistance provided   heels elevated off bed   positioned off wounds   pressure points protected  Pressure Reduction Devices:   pressure-redistributing mattress utilized   positioning supports utilized   heel offloading device utilized  Skin Protection:   adhesive use limited   tubing/devices free from skin contact   transparent dressing maintained   skin-to-skin areas padded   skin-to-device areas padded   skin sealant/moisture barrier applied   incontinence pads utilized  Taken 6/20/2024 2040 by Jill Lamar RN  Pressure Reduction Techniques:   frequent weight shift encouraged   weight shift assistance provided   heels elevated off bed   positioned off wounds   pressure points protected  Head of Bed (HOB) Positioning: HOB at 30-45 degrees  Pressure Reduction Devices:   pressure-redistributing mattress utilized   positioning supports utilized   heel offloading device utilized  Skin Protection:   adhesive use limited   tubing/devices free from skin contact   transparent dressing maintained   skin-to-skin areas padded   skin-to-device areas padded   skin sealant/moisture barrier applied   silicone foam dressing in place   incontinence pads utilized     Problem: Fall Injury Risk  Goal: Absence of Fall and Fall-Related Injury  Outcome: Ongoing, Progressing  Intervention: Identify and Manage Contributors  Description: Develop a fall prevention plan with the patient and caregiver/family.  Provide reorientation, appropriate sensory stimulation and routines with changes in mental status to decrease risk of fall.  Promote use of personal vision and auditory aids.  Assess assistance level required for safe and effective self-care; provide support as needed, such as toileting, mobilization. For age 65 and older,  implement timed toileting with assistance.  Encourage physical activity, such as performance of mobility and self-care at highest level of patient ability, multicomponent exercise program and provision of appropriate assistive devices.  If fall occurs, assess the severity of injury; implement fall injury protocol. Determine the cause and revise fall injury prevention plan.  Regularly review medication contribution to fall risk; adjust medication administration times to minimize risk of falling.  Consider risk related to polypharmacy and age.  Balance adequate pain management with potential for oversedation.  Recent Flowsheet Documentation  Taken 6/21/2024 0400 by Jill Lamar RN  Medication Review/Management: medications reviewed  Taken 6/21/2024 0200 by Jill Lamar RN  Medication Review/Management: medications reviewed  Taken 6/21/2024 0000 by Jill Lamar RN  Medication Review/Management: medications reviewed  Taken 6/20/2024 2200 by Jill Lamar RN  Medication Review/Management: medications reviewed  Taken 6/20/2024 2040 by Jill Lamar RN  Medication Review/Management: medications reviewed  Intervention: Promote Injury-Free Environment  Description: Provide a safe, barrier-free environment that encourages independent activity.  Keep care area uncluttered and well-lighted.  Determine need for increased observation or monitoring.  Avoid use of devices that minimize mobility, such as restraints or indwelling urinary catheter.  Recent Flowsheet Documentation  Taken 6/21/2024 0400 by Jill Lamar RN  Safety Promotion/Fall Prevention:   activity supervised   assistive device/personal items within reach   clutter free environment maintained   fall prevention program maintained   lighting adjusted   mobility aid in reach   nonskid shoes/slippers when out of bed   room organization consistent   safety round/check completed  Taken 6/21/2024 0200 by Jill Lamar RN  Safety Promotion/Fall Prevention:    activity supervised   assistive device/personal items within reach   clutter free environment maintained   fall prevention program maintained   lighting adjusted   mobility aid in reach   nonskid shoes/slippers when out of bed   room organization consistent   safety round/check completed  Taken 6/21/2024 0000 by Jill Lamar RN  Safety Promotion/Fall Prevention:   activity supervised   assistive device/personal items within reach   clutter free environment maintained   fall prevention program maintained   lighting adjusted   mobility aid in reach   nonskid shoes/slippers when out of bed   room organization consistent   safety round/check completed  Taken 6/20/2024 2200 by Jill Lamar, RN  Safety Promotion/Fall Prevention:   activity supervised   assistive device/personal items within reach   clutter free environment maintained   fall prevention program maintained   lighting adjusted   mobility aid in reach   nonskid shoes/slippers when out of bed   room organization consistent   safety round/check completed  Taken 6/20/2024 2040 by Jill Lamar, RN  Safety Promotion/Fall Prevention:   activity supervised   clutter free environment maintained   assistive device/personal items within reach   fall prevention program maintained   lighting adjusted   mobility aid in reach   nonskid shoes/slippers when out of bed   room organization consistent   safety round/check completed   Goal Outcome Evaluation:

## 2024-06-22 LAB
ANION GAP SERPL CALCULATED.3IONS-SCNC: 13 MMOL/L (ref 5–15)
BH BB BLOOD EXPIRATION DATE: NORMAL
BH BB BLOOD TYPE BARCODE: 5100
BH BB BLOOD TYPE BARCODE: 5100
BH BB BLOOD TYPE BARCODE: 9500
BH BB DISPENSE STATUS: NORMAL
BH BB PRODUCT CODE: NORMAL
BH BB UNIT NUMBER: NORMAL
BUN SERPL-MCNC: 84 MG/DL (ref 8–23)
BUN/CREAT SERPL: 13.7 (ref 7–25)
CALCIUM SPEC-SCNC: 8.3 MG/DL (ref 8.6–10.5)
CHLORIDE SERPL-SCNC: 104 MMOL/L (ref 98–107)
CO2 SERPL-SCNC: 23 MMOL/L (ref 22–29)
CREAT SERPL-MCNC: 6.15 MG/DL (ref 0.57–1)
CROSSMATCH INTERPRETATION: NORMAL
DEPRECATED RDW RBC AUTO: 53.9 FL (ref 37–54)
EGFRCR SERPLBLD CKD-EPI 2021: 7 ML/MIN/1.73
ERYTHROCYTE [DISTWIDTH] IN BLOOD BY AUTOMATED COUNT: 16.9 % (ref 12.3–15.4)
GLUCOSE SERPL-MCNC: 144 MG/DL (ref 65–99)
HCT VFR BLD AUTO: 26.5 % (ref 34–46.6)
HCT VFR BLD AUTO: 26.5 % (ref 34–46.6)
HGB BLD-MCNC: 8.5 G/DL (ref 12–15.9)
HGB BLD-MCNC: 8.5 G/DL (ref 12–15.9)
INR PPP: 5.15 (ref 0.89–1.12)
MCH RBC QN AUTO: 28.4 PG (ref 26.6–33)
MCHC RBC AUTO-ENTMCNC: 32.1 G/DL (ref 31.5–35.7)
MCV RBC AUTO: 88.6 FL (ref 79–97)
PLATELET # BLD AUTO: 174 10*3/MM3 (ref 140–450)
PMV BLD AUTO: 11.1 FL (ref 6–12)
POTASSIUM SERPL-SCNC: 4.2 MMOL/L (ref 3.5–5.2)
PROTHROMBIN TIME: 47.9 SECONDS (ref 12.2–14.5)
RBC # BLD AUTO: 2.99 10*6/MM3 (ref 3.77–5.28)
SODIUM SERPL-SCNC: 140 MMOL/L (ref 136–145)
UNIT  ABO: NORMAL
UNIT  RH: NORMAL
WBC NRBC COR # BLD AUTO: 8.09 10*3/MM3 (ref 3.4–10.8)

## 2024-06-22 PROCEDURE — 25010000002 ALBUMIN HUMAN 25% PER 50 ML: Performed by: INTERNAL MEDICINE

## 2024-06-22 PROCEDURE — 85027 COMPLETE CBC AUTOMATED: CPT | Performed by: STUDENT IN AN ORGANIZED HEALTH CARE EDUCATION/TRAINING PROGRAM

## 2024-06-22 PROCEDURE — 25010000002 PHYTONADIONE 10 MG/ML SOLUTION: Performed by: STUDENT IN AN ORGANIZED HEALTH CARE EDUCATION/TRAINING PROGRAM

## 2024-06-22 PROCEDURE — 85014 HEMATOCRIT: CPT | Performed by: STUDENT IN AN ORGANIZED HEALTH CARE EDUCATION/TRAINING PROGRAM

## 2024-06-22 PROCEDURE — 85610 PROTHROMBIN TIME: CPT | Performed by: STUDENT IN AN ORGANIZED HEALTH CARE EDUCATION/TRAINING PROGRAM

## 2024-06-22 PROCEDURE — 85018 HEMOGLOBIN: CPT | Performed by: STUDENT IN AN ORGANIZED HEALTH CARE EDUCATION/TRAINING PROGRAM

## 2024-06-22 PROCEDURE — 99232 SBSQ HOSP IP/OBS MODERATE 35: CPT | Performed by: STUDENT IN AN ORGANIZED HEALTH CARE EDUCATION/TRAINING PROGRAM

## 2024-06-22 PROCEDURE — P9047 ALBUMIN (HUMAN), 25%, 50ML: HCPCS | Performed by: INTERNAL MEDICINE

## 2024-06-22 PROCEDURE — 80048 BASIC METABOLIC PNL TOTAL CA: CPT | Performed by: STUDENT IN AN ORGANIZED HEALTH CARE EDUCATION/TRAINING PROGRAM

## 2024-06-22 RX ORDER — PHYTONADIONE 2 MG/ML
5 INJECTION, EMULSION INTRAMUSCULAR; INTRAVENOUS; SUBCUTANEOUS ONCE
Status: COMPLETED | OUTPATIENT
Start: 2024-06-22 | End: 2024-06-22

## 2024-06-22 RX ORDER — ALBUMIN (HUMAN) 12.5 G/50ML
25 SOLUTION INTRAVENOUS 2 TIMES DAILY
Status: COMPLETED | OUTPATIENT
Start: 2024-06-22 | End: 2024-06-23

## 2024-06-22 RX ORDER — OXYMETAZOLINE HYDROCHLORIDE 0.05 G/100ML
2 SPRAY NASAL 2 TIMES DAILY
Qty: 6 ML | Refills: 0 | Status: DISCONTINUED | OUTPATIENT
Start: 2024-06-22 | End: 2024-06-23

## 2024-06-22 RX ADMIN — PANTOPRAZOLE SODIUM 40 MG: 40 INJECTION, POWDER, FOR SOLUTION INTRAVENOUS at 09:35

## 2024-06-22 RX ADMIN — NEBIVOLOL 2.5 MG: 2.5 TABLET ORAL at 09:35

## 2024-06-22 RX ADMIN — Medication 10 ML: at 20:42

## 2024-06-22 RX ADMIN — DIPHENHYDRAMINE HYDROCHLORIDE AND LIDOCAINE HYDROCHLORIDE AND ALUMINUM HYDROXIDE AND MAGNESIUM HYDRO 5 ML: KIT at 06:38

## 2024-06-22 RX ADMIN — TERAZOSIN HYDROCHLORIDE 2 MG: 2 CAPSULE ORAL at 20:41

## 2024-06-22 RX ADMIN — Medication 2 SPRAY: at 18:09

## 2024-06-22 RX ADMIN — DIPHENHYDRAMINE HYDROCHLORIDE AND LIDOCAINE HYDROCHLORIDE AND ALUMINUM HYDROXIDE AND MAGNESIUM HYDRO 5 ML: KIT at 15:03

## 2024-06-22 RX ADMIN — SODIUM ZIRCONIUM CYCLOSILICATE 10 G: 10 POWDER, FOR SUSPENSION ORAL at 09:35

## 2024-06-22 RX ADMIN — DIPHENHYDRAMINE HYDROCHLORIDE AND LIDOCAINE HYDROCHLORIDE AND ALUMINUM HYDROXIDE AND MAGNESIUM HYDRO 5 ML: KIT at 21:18

## 2024-06-22 RX ADMIN — PHYTONADIONE 5 MG: 10 INJECTION, EMULSION INTRAMUSCULAR; INTRAVENOUS; SUBCUTANEOUS at 15:03

## 2024-06-22 RX ADMIN — ALBUMIN (HUMAN) 25 G: 0.25 INJECTION, SOLUTION INTRAVENOUS at 20:43

## 2024-06-22 RX ADMIN — Medication 10 ML: at 09:36

## 2024-06-22 RX ADMIN — PANTOPRAZOLE SODIUM 40 MG: 40 INJECTION, POWDER, FOR SOLUTION INTRAVENOUS at 18:09

## 2024-06-22 NOTE — PLAN OF CARE
Problem: Adult Inpatient Plan of Care  Goal: Plan of Care Review  Outcome: Ongoing, Progressing  Goal: Patient-Specific Goal (Individualized)  Outcome: Ongoing, Progressing  Goal: Absence of Hospital-Acquired Illness or Injury  Outcome: Ongoing, Progressing  Intervention: Identify and Manage Fall Risk  Description: Perform standard risk assessment on admission using a validated tool or comprehensive approach appropriate to the patient; reassess fall risk frequently, with change in status or transfer to another level of care.  Communicate fall injury risk to interprofessional healthcare team.  Determine need for increased observation, equipment and environmental modification, such as low bed, signage and supportive, nonskid footwear.  Adjust safety measures to individual developmental age, stage and identified risk factors.  Reinforce the importance of safety and physical activity with patient and family.  Perform regular intentional rounding to assess need for position change, pain assessment and personal needs, including assistance with toileting.  Recent Flowsheet Documentation  Taken 6/22/2024 0406 by Jill Lamar, RN  Safety Promotion/Fall Prevention:   activity supervised   clutter free environment maintained   assistive device/personal items within reach   fall prevention program maintained   lighting adjusted   mobility aid in reach   nonskid shoes/slippers when out of bed   room organization consistent   safety round/check completed  Taken 6/22/2024 0200 by Jill Lamar, RN  Safety Promotion/Fall Prevention:   activity supervised   assistive device/personal items within reach   clutter free environment maintained   fall prevention program maintained   lighting adjusted   mobility aid in reach   nonskid shoes/slippers when out of bed   room organization consistent   safety round/check completed  Taken 6/22/2024 0000 by Jill Lamar, RN  Safety Promotion/Fall Prevention:   activity supervised    assistive device/personal items within reach   clutter free environment maintained   fall prevention program maintained   lighting adjusted   mobility aid in reach   nonskid shoes/slippers when out of bed   room organization consistent   safety round/check completed  Taken 6/21/2024 2200 by Jill Lamar RN  Safety Promotion/Fall Prevention:   activity supervised   assistive device/personal items within reach   clutter free environment maintained   fall prevention program maintained   lighting adjusted   mobility aid in reach   nonskid shoes/slippers when out of bed   room organization consistent   safety round/check completed  Taken 6/21/2024 2000 by Jill Lamar RN  Safety Promotion/Fall Prevention:   activity supervised   assistive device/personal items within reach   clutter free environment maintained   fall prevention program maintained   lighting adjusted   mobility aid in reach   nonskid shoes/slippers when out of bed   room organization consistent   safety round/check completed  Intervention: Prevent Skin Injury  Description: Perform a screening for skin injury risk, such as pressure or moisture associated skin damage on admission and at regular intervals throughout hospital stay.  Keep all areas of skin (especially folds) clean and dry.  Maintain adequate skin hydration.  Relieve and redistribute pressure and protect bony prominences; implement measures based on patient-specific risk factors.  Match turning and repositioning schedule to clinical condition.  Encourage weight shift frequently; assist with reposition if unable to complete independently.  Float heels off bed; avoid pressure on the Achilles tendon.  Keep skin free from extended contact with medical devices.  Encourage functional activity and mobility, as early as tolerated.  Use aids (e.g., slide boards, mechanical lift) during transfer.  Recent Flowsheet Documentation  Taken 6/22/2024 0406 by Jill Lamar, RN  Body Position: position  changed independently  Skin Protection:   adhesive use limited   tubing/devices free from skin contact   transparent dressing maintained   skin-to-skin areas padded   skin-to-device areas padded   skin sealant/moisture barrier applied   silicone foam dressing in place   incontinence pads utilized  Taken 6/22/2024 0200 by Jill Lamar RN  Body Position: position changed independently  Skin Protection:   adhesive use limited   tubing/devices free from skin contact   transparent dressing maintained   skin-to-skin areas padded   skin-to-device areas padded   skin sealant/moisture barrier applied   silicone foam dressing in place   incontinence pads utilized  Taken 6/22/2024 0000 by Jill Lamar RN  Body Position: position changed independently  Skin Protection:   adhesive use limited   tubing/devices free from skin contact   transparent dressing maintained   skin-to-skin areas padded   skin-to-device areas padded   skin sealant/moisture barrier applied   silicone foam dressing in place  Taken 6/21/2024 2200 by Jill Lamar RN  Body Position: position changed independently  Skin Protection:   adhesive use limited   tubing/devices free from skin contact   transparent dressing maintained   skin-to-skin areas padded   skin-to-device areas padded   skin sealant/moisture barrier applied   silicone foam dressing in place   incontinence pads utilized  Taken 6/21/2024 2000 by Jill Lamar RN  Body Position:   position changed independently   weight shifting  Skin Protection:   adhesive use limited   tubing/devices free from skin contact   transparent dressing maintained   skin-to-skin areas padded   skin-to-device areas padded   skin sealant/moisture barrier applied   silicone foam dressing in place   incontinence pads utilized  Intervention: Prevent and Manage VTE (Venous Thromboembolism) Risk  Description: Assess for VTE (venous thromboembolism) risk.  Encourage and assist with early ambulation.  Initiate and  maintain compression or other therapy, as indicated, based on identified risk in accordance with organizational protocol and provider order.  Encourage both active and passive leg exercises while in bed, if unable to ambulate.  Recent Flowsheet Documentation  Taken 6/22/2024 0406 by Jill Lamar RN  Activity Management: activity encouraged  Taken 6/22/2024 0200 by Jill Lamar RN  Activity Management: activity encouraged  Taken 6/22/2024 0000 by Jill Lamar RN  Activity Management: activity encouraged  Taken 6/21/2024 2200 by Jill Lamar RN  Activity Management: activity encouraged  Taken 6/21/2024 2000 by Jill Lamar RN  Activity Management: activity encouraged  Intervention: Prevent Infection  Description: Maintain skin and mucous membrane integrity; promote hand, oral and pulmonary hygiene.  Optimize fluid balance, nutrition, sleep and glycemic control to maximize infection resistance.  Identify potential sources of infection early to prevent or mitigate progression of infection (e.g., wound, lines, devices).  Evaluate ongoing need for invasive devices; remove promptly when no longer indicated.  Recent Flowsheet Documentation  Taken 6/21/2024 2000 by Jill Lamar RN  Infection Prevention:   environmental surveillance performed   equipment surfaces disinfected   hand hygiene promoted   personal protective equipment utilized   rest/sleep promoted  Goal: Optimal Comfort and Wellbeing  Outcome: Ongoing, Progressing  Intervention: Provide Person-Centered Care  Description: Use a family-focused approach to care.  Develop trust and rapport by proactively providing information, encouraging questions, addressing concerns and offering reassurance.  Acknowledge emotional response to hospitalization.  Recognize and utilize personal coping strategies.  Honor spiritual and cultural preferences.  Recent Flowsheet Documentation  Taken 6/22/2024 0000 by Jill Lamar RN  Trust Relationship/Rapport:   care  explained   choices provided   emotional support provided   empathic listening provided   questions answered   questions encouraged   thoughts/feelings acknowledged  Taken 6/21/2024 2200 by Jill Lamar RN  Trust Relationship/Rapport:   care explained   choices provided   emotional support provided   empathic listening provided   questions answered   questions encouraged   thoughts/feelings acknowledged  Taken 6/21/2024 2000 by Jill Lamar, JEWEL  Trust Relationship/Rapport:   care explained   choices provided   emotional support provided   empathic listening provided   questions answered   questions encouraged   thoughts/feelings acknowledged  Goal: Readiness for Transition of Care  Outcome: Ongoing, Progressing     Problem: Skin Injury Risk Increased  Goal: Skin Health and Integrity  Outcome: Ongoing, Progressing  Intervention: Optimize Skin Protection  Description: Perform a full pressure injury risk assessment, as indicated by screening, upon admission to care unit.  Reassess skin (injury risk, full inspection) frequently (e.g., scheduled interval, with change in condition) to provide optimal early detection and prevention.  Maintain adequate tissue perfusion (e.g., encourage fluid balance; avoid crossing legs, constrictive clothing or devices) to promote tissue oxygenation.  Maintain head of bed at lowest degree of elevation tolerated, considering medical condition and other restrictions.  Avoid positioning onto an area that remains reddened.  Minimize incontinence and moisture (e.g., toileting schedule; moisture-wicking pad, diaper or incontinence collection device; skin moisture barrier).  Cleanse skin promptly and gently when soiled utilizing a pH-balanced cleanser.  Relieve and redistribute pressure (e.g., scheduled position changes, weight shifts, use of support surface, medical device repositioning, protective dressing application, use of positioning device, microclimate control, use of  pressure-injury-monitor  Encourage increased activity, such as sitting in a chair at the bedside or early mobilization, when able to tolerate.  Recent Flowsheet Documentation  Taken 6/22/2024 0406 by Jill Lamar RN  Pressure Reduction Techniques:   frequent weight shift encouraged   weight shift assistance provided   heels elevated off bed   positioned off wounds   pressure points protected  Head of Bed (HOB) Positioning: HOB at 30-45 degrees  Pressure Reduction Devices:   pressure-redistributing mattress utilized   positioning supports utilized   heel offloading device utilized  Skin Protection:   adhesive use limited   tubing/devices free from skin contact   transparent dressing maintained   skin-to-skin areas padded   skin-to-device areas padded   skin sealant/moisture barrier applied   silicone foam dressing in place   incontinence pads utilized  Taken 6/22/2024 0200 by Jill Lamar RN  Pressure Reduction Techniques:   frequent weight shift encouraged   weight shift assistance provided   heels elevated off bed   positioned off wounds   pressure points protected  Pressure Reduction Devices:   pressure-redistributing mattress utilized   positioning supports utilized   heel offloading device utilized  Skin Protection:   adhesive use limited   tubing/devices free from skin contact   transparent dressing maintained   skin-to-skin areas padded   skin-to-device areas padded   skin sealant/moisture barrier applied   silicone foam dressing in place   incontinence pads utilized  Taken 6/22/2024 0000 by Jill Lamar RN  Pressure Reduction Techniques:   frequent weight shift encouraged   weight shift assistance provided   heels elevated off bed   positioned off wounds   pressure points protected  Pressure Reduction Devices:   pressure-redistributing mattress utilized   positioning supports utilized   heel offloading device utilized  Skin Protection:   adhesive use limited   tubing/devices free from skin contact    transparent dressing maintained   skin-to-skin areas padded   skin-to-device areas padded   skin sealant/moisture barrier applied   silicone foam dressing in place  Taken 6/21/2024 2200 by Jill Lamar RN  Pressure Reduction Techniques:   frequent weight shift encouraged   weight shift assistance provided   heels elevated off bed   positioned off wounds   pressure points protected  Pressure Reduction Devices:   pressure-redistributing mattress utilized   positioning supports utilized  Skin Protection:   adhesive use limited   tubing/devices free from skin contact   transparent dressing maintained   skin-to-skin areas padded   skin-to-device areas padded   skin sealant/moisture barrier applied   silicone foam dressing in place   incontinence pads utilized  Taken 6/21/2024 2000 by Jill Lamar RN  Pressure Reduction Techniques:   frequent weight shift encouraged   weight shift assistance provided   heels elevated off bed   positioned off wounds   pressure points protected  Head of Bed (HOB) Positioning: HOB at 30-45 degrees  Pressure Reduction Devices:   pressure-redistributing mattress utilized   positioning supports utilized   heel offloading device utilized  Skin Protection:   adhesive use limited   tubing/devices free from skin contact   transparent dressing maintained   skin-to-skin areas padded   skin-to-device areas padded   skin sealant/moisture barrier applied   silicone foam dressing in place   incontinence pads utilized     Problem: Fall Injury Risk  Goal: Absence of Fall and Fall-Related Injury  Outcome: Ongoing, Progressing  Intervention: Identify and Manage Contributors  Description: Develop a fall prevention plan with the patient and caregiver/family.  Provide reorientation, appropriate sensory stimulation and routines with changes in mental status to decrease risk of fall.  Promote use of personal vision and auditory aids.  Assess assistance level required for safe and effective self-care;  provide support as needed, such as toileting, mobilization. For age 65 and older, implement timed toileting with assistance.  Encourage physical activity, such as performance of mobility and self-care at highest level of patient ability, multicomponent exercise program and provision of appropriate assistive devices.  If fall occurs, assess the severity of injury; implement fall injury protocol. Determine the cause and revise fall injury prevention plan.  Regularly review medication contribution to fall risk; adjust medication administration times to minimize risk of falling.  Consider risk related to polypharmacy and age.  Balance adequate pain management with potential for oversedation.  Recent Flowsheet Documentation  Taken 6/22/2024 0406 by Jill Lamar, RN  Medication Review/Management: medications reviewed  Taken 6/22/2024 0200 by Jill Lamar RN  Medication Review/Management: medications reviewed  Taken 6/22/2024 0000 by Jill Lamar RN  Medication Review/Management: medications reviewed  Taken 6/21/2024 2200 by Jill Lamar RN  Medication Review/Management: medications reviewed  Taken 6/21/2024 2000 by Jill Lamar RN  Medication Review/Management: medications reviewed  Intervention: Promote Injury-Free Environment  Description: Provide a safe, barrier-free environment that encourages independent activity.  Keep care area uncluttered and well-lighted.  Determine need for increased observation or monitoring.  Avoid use of devices that minimize mobility, such as restraints or indwelling urinary catheter.  Recent Flowsheet Documentation  Taken 6/22/2024 0406 by Jill Lamar, RN  Safety Promotion/Fall Prevention:   activity supervised   clutter free environment maintained   assistive device/personal items within reach   fall prevention program maintained   lighting adjusted   mobility aid in reach   nonskid shoes/slippers when out of bed   room organization consistent   safety round/check  completed  Taken 6/22/2024 0200 by Jill Lamar RN  Safety Promotion/Fall Prevention:   activity supervised   assistive device/personal items within reach   clutter free environment maintained   fall prevention program maintained   lighting adjusted   mobility aid in reach   nonskid shoes/slippers when out of bed   room organization consistent   safety round/check completed  Taken 6/22/2024 0000 by Jill Lamar RN  Safety Promotion/Fall Prevention:   activity supervised   assistive device/personal items within reach   clutter free environment maintained   fall prevention program maintained   lighting adjusted   mobility aid in reach   nonskid shoes/slippers when out of bed   room organization consistent   safety round/check completed  Taken 6/21/2024 2200 by Jill Lamar, RN  Safety Promotion/Fall Prevention:   activity supervised   assistive device/personal items within reach   clutter free environment maintained   fall prevention program maintained   lighting adjusted   mobility aid in reach   nonskid shoes/slippers when out of bed   room organization consistent   safety round/check completed  Taken 6/21/2024 2000 by Jill Lamar, RN  Safety Promotion/Fall Prevention:   activity supervised   assistive device/personal items within reach   clutter free environment maintained   fall prevention program maintained   lighting adjusted   mobility aid in reach   nonskid shoes/slippers when out of bed   room organization consistent   safety round/check completed   Goal Outcome Evaluation:

## 2024-06-22 NOTE — PLAN OF CARE
Problem: Adult Inpatient Plan of Care  Goal: Plan of Care Review  Outcome: Ongoing, Progressing  Goal: Patient-Specific Goal (Individualized)  Outcome: Ongoing, Progressing  Goal: Absence of Hospital-Acquired Illness or Injury  Outcome: Ongoing, Progressing  Intervention: Identify and Manage Fall Risk  Recent Flowsheet Documentation  Taken 6/22/2024 1400 by Nola Meier RN  Safety Promotion/Fall Prevention:   activity supervised   assistive device/personal items within reach   clutter free environment maintained   toileting scheduled   safety round/check completed   room organization consistent  Taken 6/22/2024 1200 by Nola Meier RN  Safety Promotion/Fall Prevention:   activity supervised   assistive device/personal items within reach   clutter free environment maintained   toileting scheduled   safety round/check completed   room organization consistent  Taken 6/22/2024 1000 by Nola Meier RN  Safety Promotion/Fall Prevention:   activity supervised   assistive device/personal items within reach   clutter free environment maintained   toileting scheduled   safety round/check completed   room organization consistent  Taken 6/22/2024 0800 by Nola Meier RN  Safety Promotion/Fall Prevention:   activity supervised   assistive device/personal items within reach   clutter free environment maintained   toileting scheduled   safety round/check completed   room organization consistent  Intervention: Prevent Skin Injury  Recent Flowsheet Documentation  Taken 6/22/2024 1400 by Nola Meier RN  Body Position:   weight shifting   supine, legs elevated  Skin Protection:   adhesive use limited   tubing/devices free from skin contact   transparent dressing maintained   skin-to-skin areas padded   skin-to-device areas padded  Taken 6/22/2024 1200 by Nola Meier RN  Body Position:   weight shifting   dangle, side of bed  Skin Protection:   adhesive use limited   tubing/devices free from skin  contact   transparent dressing maintained   skin-to-skin areas padded   skin-to-device areas padded  Taken 6/22/2024 1000 by Nola Meier RN  Body Position:   weight shifting   supine, legs elevated  Skin Protection:   adhesive use limited   tubing/devices free from skin contact   transparent dressing maintained   skin-to-skin areas padded   skin-to-device areas padded  Taken 6/22/2024 0800 by Nola Meier RN  Body Position:   weight shifting   sitting up in bed  Skin Protection:   adhesive use limited   tubing/devices free from skin contact   transparent dressing maintained   skin-to-skin areas padded   skin-to-device areas padded  Intervention: Prevent and Manage VTE (Venous Thromboembolism) Risk  Recent Flowsheet Documentation  Taken 6/22/2024 1400 by Nola Meier RN  Activity Management: up in chair  Taken 6/22/2024 1200 by Nola Meier RN  Activity Management: up in chair  Taken 6/22/2024 1000 by Nola Meier RN  Activity Management: up in chair  Taken 6/22/2024 0800 by Nola Meier RN  Activity Management: activity encouraged  Goal: Optimal Comfort and Wellbeing  Outcome: Ongoing, Progressing  Goal: Readiness for Transition of Care  Outcome: Ongoing, Progressing     Problem: Skin Injury Risk Increased  Goal: Skin Health and Integrity  Outcome: Ongoing, Progressing  Intervention: Optimize Skin Protection  Recent Flowsheet Documentation  Taken 6/22/2024 1400 by Nola Meier RN  Pressure Reduction Techniques:   frequent weight shift encouraged   heels elevated off bed   pressure points protected   weight shift assistance provided  Head of Bed (HOB) Positioning: HOB elevated  Pressure Reduction Devices:   specialty bed utilized   positioning supports utilized   heel offloading device utilized   foam padding utilized  Skin Protection:   adhesive use limited   tubing/devices free from skin contact   transparent dressing maintained   skin-to-skin areas padded   skin-to-device areas  padded  Taken 6/22/2024 1200 by Nola Meier RN  Pressure Reduction Techniques:   frequent weight shift encouraged   heels elevated off bed   pressure points protected   weight shift assistance provided  Head of Bed ([facility]) Positioning: [facility] elevated  Pressure Reduction Devices:   specialty bed utilized   positioning supports utilized   heel offloading device utilized   foam padding utilized  Skin Protection:   adhesive use limited   tubing/devices free from skin contact   transparent dressing maintained   skin-to-skin areas padded   skin-to-device areas padded  Taken 6/22/2024 1000 by Nola Meier RN  Pressure Reduction Techniques:   frequent weight shift encouraged   heels elevated off bed   pressure points protected   weight shift assistance provided  Head of Bed ([facility]) Positioning: [facility] elevated  Pressure Reduction Devices:   specialty bed utilized   positioning supports utilized   heel offloading device utilized   foam padding utilized  Skin Protection:   adhesive use limited   tubing/devices free from skin contact   transparent dressing maintained   skin-to-skin areas padded   skin-to-device areas padded  Taken 6/22/2024 0800 by Nola Meier RN  Pressure Reduction Techniques:   frequent weight shift encouraged   heels elevated off bed   pressure points protected   weight shift assistance provided  Head of Bed ([facility]) Positioning: [facility] elevated  Pressure Reduction Devices:   positioning supports utilized   specialty bed utilized   heel offloading device utilized   foam padding utilized  Skin Protection:   adhesive use limited   tubing/devices free from skin contact   transparent dressing maintained   skin-to-skin areas padded   skin-to-device areas padded     Problem: Fall Injury Risk  Goal: Absence of Fall and Fall-Related Injury  Outcome: Ongoing, Progressing  Intervention: Promote Injury-Free Environment  Recent Flowsheet Documentation  Taken 6/22/2024 1400 by Nola Meier RN  Safety  Promotion/Fall Prevention:   activity supervised   assistive device/personal items within reach   clutter free environment maintained   toileting scheduled   safety round/check completed   room organization consistent  Taken 6/22/2024 1200 by Nola Meier RN  Safety Promotion/Fall Prevention:   activity supervised   assistive device/personal items within reach   clutter free environment maintained   toileting scheduled   safety round/check completed   room organization consistent  Taken 6/22/2024 1000 by Nola Meier RN  Safety Promotion/Fall Prevention:   activity supervised   assistive device/personal items within reach   clutter free environment maintained   toileting scheduled   safety round/check completed   room organization consistent  Taken 6/22/2024 0800 by Nola Meier RN  Safety Promotion/Fall Prevention:   activity supervised   assistive device/personal items within reach   clutter free environment maintained   toileting scheduled   safety round/check completed   room organization consistent   Goal Outcome Evaluation:

## 2024-06-22 NOTE — PROGRESS NOTES
" LOS: 3 days   Patient Care Team:  Carmen Singh PA-C as PCP - General (Physician Assistant)    Chief Complaint: Acute kidney injury   Anemia  GI bleed.    Subjective     No acute events overnight. No new complaints       Subjective:  Symptoms:  No shortness of breath, chest pain or chest pressure.    Diet:  Adequate intake.    Pain:  She reports no pain.        History taken from: patient    Objective     Vital Sign Min/Max for last 24 hours  Temp  Min: 96.8 °F (36 °C)  Max: 98.2 °F (36.8 °C)   BP  Min: 110/68  Max: 128/75   Pulse  Min: 76  Max: 88   Resp  Min: 16  Max: 17   SpO2  Min: 100 %  Max: 100 %   No data recorded   No data recorded     Flowsheet Rows      Flowsheet Row First Filed Value   Admission Height 152.4 cm (60\") Documented at 06/19/2024 1315   Admission Weight 128 kg (282 lb 3 oz) Documented at 06/19/2024 1315            No intake/output data recorded.  I/O last 3 completed shifts:  In: 900 [Blood:900]  Out: -     Objective:  General Appearance:  Comfortable.    Vital signs: (most recent): Blood pressure 111/67, pulse 81, temperature 98.2 °F (36.8 °C), temperature source Oral, resp. rate 16, height 152.4 cm (60\"), weight 128 kg (282 lb 3 oz), SpO2 100%.  Vital signs are normal.    Output: Producing urine.    HEENT: Normal HEENT exam.    Lungs:  Normal effort and normal respiratory rate.  She is not in respiratory distress.  No decreased breath sounds.    Heart: Normal rate.  S1 normal and S2 normal.  No murmur or gallop.   Abdomen: Abdomen is soft and non-distended.    Extremities: There is dependent edema.    Neurological: Patient is alert and oriented to person, place and time.  Normal strength.                Results Review:     I reviewed the patient's new clinical results.    WBC WBC   Date Value Ref Range Status   06/22/2024 8.09 3.40 - 10.80 10*3/mm3 Final   06/21/2024 8.27 3.40 - 10.80 10*3/mm3 Final   06/20/2024 10.07 3.40 - 10.80 10*3/mm3 Final   06/19/2024 12.02 (H) 3.40 - " "10.80 10*3/mm3 Final      HGB Hemoglobin   Date Value Ref Range Status   06/22/2024 8.5 (L) 12.0 - 15.9 g/dL Final   06/22/2024 8.5 (L) 12.0 - 15.9 g/dL Final   06/21/2024 8.4 (L) 12.0 - 15.9 g/dL Final   06/21/2024 6.6 (C) 12.0 - 15.9 g/dL Final   06/20/2024 8.0 (L) 12.0 - 15.9 g/dL Final   06/20/2024 8.0 (L) 12.0 - 15.9 g/dL Final   06/19/2024 5.8 (C) 12.0 - 15.9 g/dL Final      HCT Hematocrit   Date Value Ref Range Status   06/22/2024 26.5 (L) 34.0 - 46.6 % Final   06/22/2024 26.5 (L) 34.0 - 46.6 % Final   06/21/2024 25.3 (L) 34.0 - 46.6 % Final   06/21/2024 20.6 (C) 34.0 - 46.6 % Final   06/20/2024 24.3 (L) 34.0 - 46.6 % Final   06/20/2024 24.6 (L) 34.0 - 46.6 % Final   06/19/2024 18.9 (C) 34.0 - 46.6 % Final      Platlets No results found for: \"LABPLAT\"   MCV MCV   Date Value Ref Range Status   06/22/2024 88.6 79.0 - 97.0 fL Final   06/21/2024 91.6 79.0 - 97.0 fL Final   06/20/2024 91.8 79.0 - 97.0 fL Final   06/19/2024 100.0 (H) 79.0 - 97.0 fL Final          Sodium Sodium   Date Value Ref Range Status   06/21/2024 139 136 - 145 mmol/L Final   06/20/2024 135 (L) 136 - 145 mmol/L Final   06/20/2024 137 136 - 145 mmol/L Final   06/19/2024 134 (L) 136 - 145 mmol/L Final      Potassium Potassium   Date Value Ref Range Status   06/21/2024 4.5 3.5 - 5.2 mmol/L Final   06/20/2024 4.9 3.5 - 5.2 mmol/L Final   06/20/2024 5.6 (H) 3.5 - 5.2 mmol/L Final     Comment:     Slight hemolysis detected by analyzer. Result may be falsely elevated.   06/19/2024 6.0 (H) 3.5 - 5.2 mmol/L Final      Chloride Chloride   Date Value Ref Range Status   06/21/2024 103 98 - 107 mmol/L Final   06/20/2024 96 (L) 98 - 107 mmol/L Final   06/20/2024 105 98 - 107 mmol/L Final   06/19/2024 103 98 - 107 mmol/L Final      CO2 CO2   Date Value Ref Range Status   06/21/2024 26.0 22.0 - 29.0 mmol/L Final   06/20/2024 25.0 22.0 - 29.0 mmol/L Final   06/20/2024 19.0 (L) 22.0 - 29.0 mmol/L Final   06/19/2024 17.0 (L) 22.0 - 29.0 mmol/L Final      BUN BUN " "  Date Value Ref Range Status   06/21/2024 83 (H) 8 - 23 mg/dL Final   06/20/2024 85 (H) 8 - 23 mg/dL Final   06/20/2024 85 (H) 8 - 23 mg/dL Final   06/19/2024 88 (H) 8 - 23 mg/dL Final      Creatinine Creatinine   Date Value Ref Range Status   06/21/2024 6.34 (H) 0.57 - 1.00 mg/dL Final   06/20/2024 6.43 (H) 0.57 - 1.00 mg/dL Final   06/20/2024 6.35 (H) 0.57 - 1.00 mg/dL Final   06/19/2024 6.77 (H) 0.57 - 1.00 mg/dL Final      Calcium Calcium   Date Value Ref Range Status   06/21/2024 8.3 (L) 8.6 - 10.5 mg/dL Final   06/20/2024 9.0 8.6 - 10.5 mg/dL Final   06/20/2024 9.1 8.6 - 10.5 mg/dL Final   06/19/2024 9.5 8.6 - 10.5 mg/dL Final      PO4 No results found for: \"CAPO4\"   Albumin Albumin   Date Value Ref Range Status   06/19/2024 3.2 (L) 3.5 - 5.2 g/dL Final      Magnesium Magnesium   Date Value Ref Range Status   06/21/2024 2.0 1.6 - 2.4 mg/dL Final      Uric Acid No results found for: \"URICACID\"     Medication Review: Yes    Assessment & Plan       Renal failure    Acute blood loss anemia    Hyperkalemia    Acute upper GI bleeding    Chronic deep vein thrombosis (DVT)    Primary hypertension    Chronic acquired lymphedema    Chronic cutaneous venous stasis ulcer    Results from last 7 days   Lab Units 06/22/24  0745 06/21/24  2214 06/21/24  0507 06/20/24  1815 06/20/24  1325 06/20/24  0035 06/19/24  1328   SODIUM mmol/L  --   --  139  --  135* 137 134*   POTASSIUM mmol/L  --   --  4.5  --  4.9 5.6* 6.0*   CHLORIDE mmol/L  --   --  103  --  96* 105 103   CO2 mmol/L  --   --  26.0  --  25.0 19.0* 17.0*   BUN mg/dL  --   --  83*  --  85* 85* 88*   CREATININE mg/dL  --   --  6.34*  --  6.43* 6.35* 6.77*   CALCIUM mg/dL  --   --  8.3*  --  9.0 9.1 9.5   ALBUMIN g/dL  --   --   --   --   --   --  3.2*   WBC 10*3/mm3 8.09  --  8.27  --   --  10.07 12.02*   HEMOGLOBIN g/dL 8.5*  8.5* 8.4* 6.6* 8.0*  --  8.0* 5.8*   PLATELETS 10*3/mm3 174  --  177  --   --  209 232       Intake/Output Summary (Last 24 hours) at " 6/22/2024 1003  Last data filed at 6/21/2024 1533  Gross per 24 hour   Intake 900 ml   Output --   Net 900 ml         Assessment & Plan       Acute kidney injury: Last known stable cr ~ 1.4mg/dl. Cr 6.7 mg/dl on admission. UA large alison esterase+ trace protein blood moderate ( Not a clean catch) UACR - 80.9. Non oliguric - DDx - Pre-quinten/ATN vs AIN     Hyperkalemia: In the setting of WALTER:      Met acidosis: Due to WALTER    Severe anemia: FOBT+. On AC. GI following.      DANIEL: Likely due to severe anemia     Hx of breast cancer: On anastrozole     Supra-therapeutic INR: On dabigatran.  Concern for Gi bleed. FOBT positive.      Recs  - Gentle IV hydration   - Monitor I/O   - Avoid nephrotoxic agents.   - Renal diet   - Adjust meds per renal function   - No emergent need of RRT   - Monitor H/H and transfuse for Hgb less than 7.0       Jose Schwab MD  06/22/24  09:58 EDT

## 2024-06-22 NOTE — PROGRESS NOTES
" LOS: 2 days   Patient Care Team:  Carmen Singh PA-C as PCP - General (Physician Assistant)    Chief Complaint: Acute kidney injury   Anemia  GI bleed.    Subjective         Subjective:  Symptoms:  No shortness of breath, chest pain or chest pressure.    Diet:  Adequate intake.    Pain:  She reports no pain.        History taken from: patient    Objective     Vital Sign Min/Max for last 24 hours  Temp  Min: 96.8 °F (36 °C)  Max: 97.6 °F (36.4 °C)   BP  Min: 110/71  Max: 133/75   Pulse  Min: 76  Max: 86   Resp  Min: 16  Max: 16   SpO2  Min: 97 %  Max: 99 %   No data recorded   No data recorded     Flowsheet Rows      Flowsheet Row First Filed Value   Admission Height 152.4 cm (60\") Documented at 06/19/2024 1315   Admission Weight 128 kg (282 lb 3 oz) Documented at 06/19/2024 1315            No intake/output data recorded.  I/O last 3 completed shifts:  In: 900 [Blood:900]  Out: -     Objective:  General Appearance:  Comfortable.    Vital signs: (most recent): Blood pressure 110/71, pulse 80, temperature 97 °F (36.1 °C), resp. rate 16, height 152.4 cm (60\"), weight 128 kg (282 lb 3 oz), SpO2 97%.  Vital signs are normal.    Output: Producing urine.    HEENT: Normal HEENT exam.    Lungs:  Normal effort and normal respiratory rate.  She is not in respiratory distress.  No decreased breath sounds.    Heart: Normal rate.  S1 normal and S2 normal.  No murmur or gallop.   Abdomen: Abdomen is soft and non-distended.    Extremities: There is dependent edema.    Neurological: Patient is alert and oriented to person, place and time.  Normal strength.                Results Review:     I reviewed the patient's new clinical results.    WBC WBC   Date Value Ref Range Status   06/21/2024 8.27 3.40 - 10.80 10*3/mm3 Final   06/20/2024 10.07 3.40 - 10.80 10*3/mm3 Final   06/19/2024 12.02 (H) 3.40 - 10.80 10*3/mm3 Final      HGB Hemoglobin   Date Value Ref Range Status   06/21/2024 6.6 (C) 12.0 - 15.9 g/dL Final   06/20/2024 " "8.0 (L) 12.0 - 15.9 g/dL Final   06/20/2024 8.0 (L) 12.0 - 15.9 g/dL Final   06/19/2024 5.8 (C) 12.0 - 15.9 g/dL Final      HCT Hematocrit   Date Value Ref Range Status   06/21/2024 20.6 (C) 34.0 - 46.6 % Final   06/20/2024 24.3 (L) 34.0 - 46.6 % Final   06/20/2024 24.6 (L) 34.0 - 46.6 % Final   06/19/2024 18.9 (C) 34.0 - 46.6 % Final      Platlets No results found for: \"LABPLAT\"   MCV MCV   Date Value Ref Range Status   06/21/2024 91.6 79.0 - 97.0 fL Final   06/20/2024 91.8 79.0 - 97.0 fL Final   06/19/2024 100.0 (H) 79.0 - 97.0 fL Final          Sodium Sodium   Date Value Ref Range Status   06/21/2024 139 136 - 145 mmol/L Final   06/20/2024 135 (L) 136 - 145 mmol/L Final   06/20/2024 137 136 - 145 mmol/L Final   06/19/2024 134 (L) 136 - 145 mmol/L Final      Potassium Potassium   Date Value Ref Range Status   06/21/2024 4.5 3.5 - 5.2 mmol/L Final   06/20/2024 4.9 3.5 - 5.2 mmol/L Final   06/20/2024 5.6 (H) 3.5 - 5.2 mmol/L Final     Comment:     Slight hemolysis detected by analyzer. Result may be falsely elevated.   06/19/2024 6.0 (H) 3.5 - 5.2 mmol/L Final      Chloride Chloride   Date Value Ref Range Status   06/21/2024 103 98 - 107 mmol/L Final   06/20/2024 96 (L) 98 - 107 mmol/L Final   06/20/2024 105 98 - 107 mmol/L Final   06/19/2024 103 98 - 107 mmol/L Final      CO2 CO2   Date Value Ref Range Status   06/21/2024 26.0 22.0 - 29.0 mmol/L Final   06/20/2024 25.0 22.0 - 29.0 mmol/L Final   06/20/2024 19.0 (L) 22.0 - 29.0 mmol/L Final   06/19/2024 17.0 (L) 22.0 - 29.0 mmol/L Final      BUN BUN   Date Value Ref Range Status   06/21/2024 83 (H) 8 - 23 mg/dL Final   06/20/2024 85 (H) 8 - 23 mg/dL Final   06/20/2024 85 (H) 8 - 23 mg/dL Final   06/19/2024 88 (H) 8 - 23 mg/dL Final      Creatinine Creatinine   Date Value Ref Range Status   06/21/2024 6.34 (H) 0.57 - 1.00 mg/dL Final   06/20/2024 6.43 (H) 0.57 - 1.00 mg/dL Final   06/20/2024 6.35 (H) 0.57 - 1.00 mg/dL Final   06/19/2024 6.77 (H) 0.57 - 1.00 mg/dL " "Final      Calcium Calcium   Date Value Ref Range Status   06/21/2024 8.3 (L) 8.6 - 10.5 mg/dL Final   06/20/2024 9.0 8.6 - 10.5 mg/dL Final   06/20/2024 9.1 8.6 - 10.5 mg/dL Final   06/19/2024 9.5 8.6 - 10.5 mg/dL Final      PO4 No results found for: \"CAPO4\"   Albumin Albumin   Date Value Ref Range Status   06/19/2024 3.2 (L) 3.5 - 5.2 g/dL Final      Magnesium Magnesium   Date Value Ref Range Status   06/21/2024 2.0 1.6 - 2.4 mg/dL Final      Uric Acid No results found for: \"URICACID\"     Medication Review: Yes    Assessment & Plan       Renal failure    Acute blood loss anemia    Hyperkalemia    Acute upper GI bleeding    Chronic deep vein thrombosis (DVT)    Primary hypertension    Chronic acquired lymphedema    Chronic cutaneous venous stasis ulcer      Assessment & Plan    Assessment & Plan:     Acute kidney injury: Last known stable cr ~ 1.4mg/dl. Cr 6.7 mg/dl on admission. UA large alison esterase+ trace protein blood moderate ( Not a clean catch).     Hyperkalemia: In the setting of WALTER:      Met acidosis: Due to WALTER    Severe anemia: FOBT+. On AC. GI following.      DANIEL: Likely due to severe anemia     Hx of breast cancer: On anastrozole     Volume status: hx of chronic LE edema.     Supra-therapeutic INR: On dabigatran.  Concern for Gi bleed. FOBT positive.      Recs  Acute kidney injury likely severe hemodynamic injury in the setting of poor po intake and anemia s/p 2 unit PRBC. Patient has received IV abx for cellulitis for 3 weeks therefore  possibility of AIN as well Urine eos 0%. So far no significant improvement in renal function   Transfuse at hb<7  Daily labs.   Strict I/o  No indication of dialysis at present.     Reji Deutsch MD  06/21/24  22:16 EDT            "

## 2024-06-22 NOTE — PROGRESS NOTES
[facility]  PROGRESS NOTE    Patient Name: Moon Lerma  : 1955  MRN: [id number]    Date of Admission: 2024  Primary Care Physician: Carmen Singh PA-C    Subjective   Subjective     CC:  Acute renal failure    HPI:  Last stool was yesterday.  Blew her nose and had a little bit of blood on the tissue.  No nausea or vomiting.  Now eating more after Magic mouthwash was started.    Objective   Objective     Vital Signs:   Temp:  [96.8 °F (36 °C)-98.2 °F (36.8 °C)] 98.2 °F (36.8 °C)  Heart Rate:  [76-88] 81  Resp:  [16-17] 17  BP: (110-133)/(68-86) 110/68     Physical Exam:  Constitutional: No acute distress, awake, alert, obese  HENT: NCAT, mucous membranes moist, left-sided tongue ulceration  Respiratory: Clear to auscultation bilaterally, respiratory effort normal   Cardiovascular: RRR, no murmurs, rubs, or gallops  Gastrointestinal: Positive bowel sounds, soft, nontender, nondistended  Musculoskeletal: No bilateral ankle edema  Psychiatric: Appropriate affect, cooperative  Neurologic: PERRL, symmetric facies, moves all extremities, speech clear    Results Reviewed:  LAB RESULTS:      Lab 24  2214 24  0507 24  1815 24  0035 24  1328   WBC  --  8.27  --  10.07 12.02*   HEMOGLOBIN 8.4* 6.6* 8.0* 8.0* 5.8*   HEMATOCRIT 25.3* 20.6* 24.3* 24.6* 18.9*   PLATELETS  --  177  --  209 232   NEUTROS ABS  --   --   --  7.17* 9.14*   IMMATURE GRANS (ABS)  --   --   --  0.07* 0.12*   LYMPHS ABS  --   --   --  2.02 1.81   MONOS ABS  --   --   --  0.57 0.70   EOS ABS  --   --   --  0.22 0.22   MCV  --  91.6  --  91.8 100.0*   PROTIME  --  70.9*  --   --  82.1*         Lab 24  0507 24  1325 24  0035 24  2219 24  1328   SODIUM 139 135* 137  --  134*   POTASSIUM 4.5 4.9 5.6*  --  6.0*   CHLORIDE 103 96* 105  --  103   CO2 26.0 25.0 19.0*  --  17.0*   ANION GAP 10.0 14.0 13.0  --  14.0   BUN 83* 85* 85*  --   88*   CREATININE 6.34* 6.43* 6.35*  --  6.77*   EGFR 6.7* 6.6* 6.7*  --  6.2*   GLUCOSE 114* 172* 117*  --  125*   CALCIUM 8.3* 9.0 9.1  --  9.5   MAGNESIUM 2.0  --   --   --   --    PHOSPHORUS 5.3*  --   --   --   --    HEMOGLOBIN A1C  --   --   --  5.70*  --          Lab 06/19/24  1328   TOTAL PROTEIN 7.3   ALBUMIN 3.2*   GLOBULIN 4.1   ALT (SGPT) 22   AST (SGOT) 30   BILIRUBIN 0.3   ALK PHOS 59         Lab 06/21/24  0507 06/19/24  1328   PROBNP  --  3,437.0*   HSTROP T  --  26*   PROTIME 70.9* 82.1*   INR 8.51* >10.00*             Lab 06/19/24  1422   ABO TYPING O   RH TYPING Positive   ANTIBODY SCREEN Negative         Brief Urine Lab Results  (Last result in the past 365 days)        Color   Clarity   Blood   Leuk Est   Nitrite   Protein   CREAT   Urine HCG        06/20/24 1015             23.5                 Microbiology Results Abnormal       Procedure Component Value - Date/Time    Eosinophil Smear - Urine, Urine, Clean Catch [[id number]]  (Normal) Collected: 06/20/24 1014    Lab Status: Final result Specimen: Urine, Clean Catch Updated: 06/20/24 1412     Eosinophil Smear 0 % EOS/100 Cells     Narrative:      No eosinophil seen            US Renal Limited    Result Date: 6/20/2024  US RENAL LIMITED Date of Exam: 6/20/2024 7:15 PM EDT Indication: WALTER. Comparison: No comparisons available. Technique: Grayscale and color Doppler ultrasound evaluation of the kidneys and urinary bladder was performed. Findings: Limited study due to overlying bowel gas. Right Kidney: The right kidney measures 9.1 x 4.4 x 4.6 cm. Renal parenchyma is within normal limits in echogenicity. There is borderline cortical thinning measuring 1 cm. There is no hydronephrosis. No obvious renal calculi. No evidence of solid renal mass. Left Kidney: The left kidney measures 8.6 x 4.5 x 4.5 cm. Renal parenchyma is within normal limits in echogenicity. There is mild left-sided cortical thinning measuring 0.8 cm. There is no hydronephrosis. No  obvious renal calculi. No evidence of solid renal mass. Bladder: The urinary bladder appears unremarkable.     Impression: Impression: No evidence of hydronephrosis. Borderline cortical thinning of the right kidney and mild cortical thinning of the left kidney. Correlate for medical renal disease. Electronically Signed: Andrew Quinonez MD  6/20/2024 9:00 PM EDT  Workstation ID: [id number]         Current medications:  Scheduled Meds:amLODIPine, 2.5 mg, Oral, Q24H  First Mouthwash (Magic Mouthwash), 5 mL, Swish & Spit, Q8H  nebivolol, 2.5 mg, Oral, Q24H  pantoprazole, 40 mg, Intravenous, BID AC  sodium chloride, 10 mL, Intravenous, Q12H  sodium zirconium cyclosilicate, 10 g, Oral, TID  terazosin, 2 mg, Oral, Nightly      Continuous Infusions:sodium chloride, 50 mL/hr, Last Rate: 50 mL/hr (06/21/24 2111)      PRN Meds:.  acetaminophen    Biotene dry mouth    senna-docusate sodium **AND** polyethylene glycol **AND** bisacodyl **AND** bisacodyl    sodium chloride    sodium chloride    sodium chloride    Assessment & Plan   Assessment & Plan     Active Hospital Problems    Diagnosis  POA    **Renal failure [N19]  Yes    Acute blood loss anemia [D62]  Unknown    Hyperkalemia [E87.5]  Unknown    Acute upper GI bleeding [K92.2]  Unknown    Chronic deep vein thrombosis (DVT) [I82.509]  Unknown    Primary hypertension [I10]  Unknown    Chronic acquired lymphedema [I89.0]  Unknown    Chronic cutaneous venous stasis ulcer [I83.009, L97.909]  Unknown      Resolved Hospital Problems   No resolved problems to display.        Brief Hospital Course to date:  Moon Lerma is a 68 y.o. female on chronic pradaxa for DVT, reported Cr of 1.4 in April, recent prolonged abx for LE edema and weeping; she presents w/ a Cr of 6.77 and K of 6.0, INR is >10, Hgb of 5.8, and melena; suspect high serum levels of pradaxa from renal failure, which precipitated GIB.      *Of note, family is concerned about patient's lack of insurance coverage while  in the [place], they would like her stabilized enough to make the trip back to [place] and complete the rest of her care there, as much as is reasonable     Acute blood loss anemia  Acute upper GIB  --H/H of 5.8/ 18.9 in ED, hemoglobin in April of 9.8 per records   --s/p 4 units RBC; apparently the daughter refused the O- blood and demanded O+ blood this AM  --GI consulted  -- If melena persists once INR is corrected, then may perform EGD/colonoscopy inpatient.  Can consider this to be performed in [place] if patient stable with medical management  --holding Pradaxa  --PPI  --Patient does not qualify for Pradaxa reversal based on comorbid conditions, does not have critical bleeding at this time  --discussed with GI, s/p vitamin K PO x1 on 6/21 --> INR decreased to 5.15, will give additional dose of PO vitamin K today     Acute renal failure  Hyperkalemia  Metabolic acidosis   --creatinine in April was 1.4  --creatinine in ED of 6.77 likely from decreased oral intake and anemia  --Nephrology consulted  --Potassium of 6.0, treated with Insulin/ Bicarb/ Dextrose   --started on Bicarb drip, transitioned to NS, rate reduced  --continue TID lokelma for now; if labs show no hyperkalemia, then will stop     Chronic LLE DVT  --holding Pradaxa for above  --my practice partner discussed with Pharmacy, doesn't need DVT ppx at this time due to long half life of Pradaxa and severe renal disease; no SCDs given DVT    Mouth pain due to tongue ulcerations - improving  --Magic mouthwash     HTN  --cont Nebivolol and Amlodipine daily   --consider stopping Amlodipine for BLE edema   --cont Prazosin nightly      Chronic venous stasis ulcer  Chronic lymphedema  --PT wound for compression wraps and toe ulcerations      Oral ulceration --magic mouthwash   History of breast cancer 2020    Expected Discharge Location and Transportation: home  Expected Discharge   Expected Discharge Date: 6/24/2024; Expected Discharge Time:      VTE  Prophylaxis:  Mechanical VTE prophylaxis orders are present.         AM-PAC 6 Clicks Score (PT): 18 (06/21/24 2000)    CODE STATUS:   Code Status and Medical Interventions:   Ordered at: 06/19/24 8156     Level Of Support Discussed With:    Patient     Code Status (Patient has no pulse and is not breathing):    CPR (Attempt to Resuscitate)     Medical Interventions (Patient has pulse or is breathing):    Full Support       Mali Carrillo MD  06/22/24

## 2024-06-22 NOTE — PROGRESS NOTES
Hemoglobin is stable at 8.5 g/dL after transfusion.  Suspect patient lost blood from biting her tongue in the setting of supratherapeutic INR and use of Pradaxa. Likely Pradaxa toxicity in the setting of renal failure.    Will sign off.  Please call with overt bleeding, or other concerns.

## 2024-06-23 LAB
ANION GAP SERPL CALCULATED.3IONS-SCNC: 14 MMOL/L (ref 5–15)
BUN SERPL-MCNC: 82 MG/DL (ref 8–23)
BUN/CREAT SERPL: 14.9 (ref 7–25)
CALCIUM SPEC-SCNC: 8.2 MG/DL (ref 8.6–10.5)
CHLORIDE SERPL-SCNC: 105 MMOL/L (ref 98–107)
CO2 SERPL-SCNC: 21 MMOL/L (ref 22–29)
CREAT SERPL-MCNC: 5.52 MG/DL (ref 0.57–1)
DEPRECATED RDW RBC AUTO: 54.5 FL (ref 37–54)
EGFRCR SERPLBLD CKD-EPI 2021: 8 ML/MIN/1.73
ERYTHROCYTE [DISTWIDTH] IN BLOOD BY AUTOMATED COUNT: 16.7 % (ref 12.3–15.4)
GLUCOSE SERPL-MCNC: 107 MG/DL (ref 65–99)
HCT VFR BLD AUTO: 24 % (ref 34–46.6)
HGB BLD-MCNC: 7.6 G/DL (ref 12–15.9)
INR PPP: 4.42 (ref 0.89–1.12)
MCH RBC QN AUTO: 28.6 PG (ref 26.6–33)
MCHC RBC AUTO-ENTMCNC: 31.7 G/DL (ref 31.5–35.7)
MCV RBC AUTO: 90.2 FL (ref 79–97)
PLATELET # BLD AUTO: 176 10*3/MM3 (ref 140–450)
PMV BLD AUTO: 11.1 FL (ref 6–12)
POTASSIUM SERPL-SCNC: 3.9 MMOL/L (ref 3.5–5.2)
PROTHROMBIN TIME: 42.5 SECONDS (ref 12.2–14.5)
RBC # BLD AUTO: 2.66 10*6/MM3 (ref 3.77–5.28)
SODIUM SERPL-SCNC: 140 MMOL/L (ref 136–145)
WBC NRBC COR # BLD AUTO: 7.28 10*3/MM3 (ref 3.4–10.8)

## 2024-06-23 PROCEDURE — 25010000002 PHYTONADIONE 10 MG/ML SOLUTION: Performed by: STUDENT IN AN ORGANIZED HEALTH CARE EDUCATION/TRAINING PROGRAM

## 2024-06-23 PROCEDURE — P9047 ALBUMIN (HUMAN), 25%, 50ML: HCPCS | Performed by: INTERNAL MEDICINE

## 2024-06-23 PROCEDURE — 97116 GAIT TRAINING THERAPY: CPT

## 2024-06-23 PROCEDURE — 25010000002 ALBUMIN HUMAN 25% PER 50 ML: Performed by: INTERNAL MEDICINE

## 2024-06-23 PROCEDURE — 25810000003 SODIUM CHLORIDE 0.9 % SOLUTION: Performed by: INTERNAL MEDICINE

## 2024-06-23 PROCEDURE — 85027 COMPLETE CBC AUTOMATED: CPT | Performed by: STUDENT IN AN ORGANIZED HEALTH CARE EDUCATION/TRAINING PROGRAM

## 2024-06-23 PROCEDURE — 99232 SBSQ HOSP IP/OBS MODERATE 35: CPT | Performed by: STUDENT IN AN ORGANIZED HEALTH CARE EDUCATION/TRAINING PROGRAM

## 2024-06-23 PROCEDURE — 80048 BASIC METABOLIC PNL TOTAL CA: CPT | Performed by: STUDENT IN AN ORGANIZED HEALTH CARE EDUCATION/TRAINING PROGRAM

## 2024-06-23 PROCEDURE — 25810000003 LACTATED RINGERS PER 1000 ML: Performed by: INTERNAL MEDICINE

## 2024-06-23 PROCEDURE — 85610 PROTHROMBIN TIME: CPT | Performed by: STUDENT IN AN ORGANIZED HEALTH CARE EDUCATION/TRAINING PROGRAM

## 2024-06-23 PROCEDURE — 97110 THERAPEUTIC EXERCISES: CPT

## 2024-06-23 RX ORDER — ALBUMIN (HUMAN) 12.5 G/50ML
25 SOLUTION INTRAVENOUS ONCE
Status: COMPLETED | OUTPATIENT
Start: 2024-06-23 | End: 2024-06-23

## 2024-06-23 RX ORDER — SODIUM CHLORIDE, SODIUM LACTATE, POTASSIUM CHLORIDE, CALCIUM CHLORIDE 600; 310; 30; 20 MG/100ML; MG/100ML; MG/100ML; MG/100ML
50 INJECTION, SOLUTION INTRAVENOUS CONTINUOUS
Status: DISCONTINUED | OUTPATIENT
Start: 2024-06-23 | End: 2024-06-30

## 2024-06-23 RX ORDER — PHYTONADIONE 2 MG/ML
5 INJECTION, EMULSION INTRAMUSCULAR; INTRAVENOUS; SUBCUTANEOUS ONCE
Status: COMPLETED | OUTPATIENT
Start: 2024-06-23 | End: 2024-06-23

## 2024-06-23 RX ORDER — ECHINACEA PURPUREA EXTRACT 125 MG
1 TABLET ORAL AS NEEDED
Status: DISCONTINUED | OUTPATIENT
Start: 2024-06-23 | End: 2024-07-03 | Stop reason: HOSPADM

## 2024-06-23 RX ORDER — OXYMETAZOLINE HYDROCHLORIDE 0.05 G/100ML
2 SPRAY NASAL 2 TIMES DAILY PRN
Status: DISCONTINUED | OUTPATIENT
Start: 2024-06-23 | End: 2024-07-03 | Stop reason: HOSPADM

## 2024-06-23 RX ADMIN — Medication 10 ML: at 08:20

## 2024-06-23 RX ADMIN — PANTOPRAZOLE SODIUM 40 MG: 40 INJECTION, POWDER, FOR SOLUTION INTRAVENOUS at 08:20

## 2024-06-23 RX ADMIN — PHYTONADIONE 5 MG: 10 INJECTION, EMULSION INTRAMUSCULAR; INTRAVENOUS; SUBCUTANEOUS at 17:02

## 2024-06-23 RX ADMIN — Medication 2 SPRAY: at 08:20

## 2024-06-23 RX ADMIN — DIPHENHYDRAMINE HYDROCHLORIDE AND LIDOCAINE HYDROCHLORIDE AND ALUMINUM HYDROXIDE AND MAGNESIUM HYDRO 5 ML: KIT at 21:34

## 2024-06-23 RX ADMIN — ALBUMIN (HUMAN) 25 G: 0.25 INJECTION, SOLUTION INTRAVENOUS at 08:20

## 2024-06-23 RX ADMIN — SODIUM CHLORIDE 75 ML/HR: 9 INJECTION, SOLUTION INTRAVENOUS at 05:10

## 2024-06-23 RX ADMIN — Medication 10 ML: at 21:35

## 2024-06-23 RX ADMIN — DIPHENHYDRAMINE HYDROCHLORIDE AND LIDOCAINE HYDROCHLORIDE AND ALUMINUM HYDROXIDE AND MAGNESIUM HYDRO 5 ML: KIT at 13:59

## 2024-06-23 RX ADMIN — TERAZOSIN HYDROCHLORIDE 2 MG: 2 CAPSULE ORAL at 21:31

## 2024-06-23 RX ADMIN — PANTOPRAZOLE SODIUM 40 MG: 40 INJECTION, POWDER, FOR SOLUTION INTRAVENOUS at 17:02

## 2024-06-23 RX ADMIN — SODIUM CHLORIDE, POTASSIUM CHLORIDE, SODIUM LACTATE AND CALCIUM CHLORIDE 75 ML/HR: 600; 310; 30; 20 INJECTION, SOLUTION INTRAVENOUS at 10:22

## 2024-06-23 RX ADMIN — ALBUMIN (HUMAN) 25 G: 0.25 INJECTION, SOLUTION INTRAVENOUS at 13:59

## 2024-06-23 RX ADMIN — DIPHENHYDRAMINE HYDROCHLORIDE AND LIDOCAINE HYDROCHLORIDE AND ALUMINUM HYDROXIDE AND MAGNESIUM HYDRO 5 ML: KIT at 05:10

## 2024-06-23 RX ADMIN — NEBIVOLOL 2.5 MG: 2.5 TABLET ORAL at 08:20

## 2024-06-23 NOTE — PROGRESS NOTES
" LOS: 4 days   Patient Care Team:  Carmen Singh PA-C as PCP - General (Physician Assistant)    Chief Complaint: Acute kidney injury   Anemia  GI bleed.    No CP or SOA , fever, chills, rigors, rash, N/V, Constipation.       Subjective     No acute events overnight. No new complaints         History taken from: patient    Objective     Vital Sign Min/Max for last 24 hours  Temp  Min: 97.2 °F (36.2 °C)  Max: 98.3 °F (36.8 °C)   BP  Min: 103/67  Max: 124/73   Pulse  Min: 79  Max: 97   Resp  Min: 16  Max: 18   SpO2  Min: 97 %  Max: 100 %   No data recorded   No data recorded     Flowsheet Rows      Flowsheet Row First Filed Value   Admission Height 152.4 cm (60\") Documented at 06/19/2024 1315   Admission Weight 128 kg (282 lb 3 oz) Documented at 06/19/2024 1315            I/O this shift:  In: 240 [P.O.:240]  Out: -   I/O last 3 completed shifts:  In: 120 [P.O.:120]  Out: -     Vitals:    06/23/24 0414 06/23/24 0753 06/23/24 0817 06/23/24 1132   BP: 123/82 124/73 115/66 128/77   BP Location: Left arm  Left arm Left arm   Patient Position: Lying  Lying Sitting   Pulse: 97  84 78   Resp: 18  18 18   Temp: 98.2 °F (36.8 °C)  97.2 °F (36.2 °C) 97.5 °F (36.4 °C)   TempSrc: Oral  Oral Oral   SpO2: 100%  100% 100%   Weight:       Height:             Gen: Alert, NAD   HENT: NC, AT, EOMI   NECK: Supple, no JVD, Trachea midline   LUNGS: CTA bilaterally, non labored respirtation   CVS: S1/S2 audible, RRR, no murmur   Abd: Soft, NT, ND, BS+   Ext: No pedal edema, no cyanosis   CNS: Alert, No focal deficit noted grossly  Psy: Cooperative  Skin: Warm, dry and intact        Results Review:     I reviewed the patient's new clinical results.    WBC WBC   Date Value Ref Range Status   06/23/2024 7.28 3.40 - 10.80 10*3/mm3 Final   06/22/2024 8.09 3.40 - 10.80 10*3/mm3 Final   06/21/2024 8.27 3.40 - 10.80 10*3/mm3 Final      HGB Hemoglobin   Date Value Ref Range Status   06/23/2024 7.6 (L) 12.0 - 15.9 g/dL Final   06/22/2024 8.5 " "(L) 12.0 - 15.9 g/dL Final   06/22/2024 8.5 (L) 12.0 - 15.9 g/dL Final   06/21/2024 8.4 (L) 12.0 - 15.9 g/dL Final   06/21/2024 6.6 (C) 12.0 - 15.9 g/dL Final   06/20/2024 8.0 (L) 12.0 - 15.9 g/dL Final      HCT Hematocrit   Date Value Ref Range Status   06/23/2024 24.0 (L) 34.0 - 46.6 % Final   06/22/2024 26.5 (L) 34.0 - 46.6 % Final   06/22/2024 26.5 (L) 34.0 - 46.6 % Final   06/21/2024 25.3 (L) 34.0 - 46.6 % Final   06/21/2024 20.6 (C) 34.0 - 46.6 % Final   06/20/2024 24.3 (L) 34.0 - 46.6 % Final      Platlets No results found for: \"LABPLAT\"   MCV MCV   Date Value Ref Range Status   06/23/2024 90.2 79.0 - 97.0 fL Final   06/22/2024 88.6 79.0 - 97.0 fL Final   06/21/2024 91.6 79.0 - 97.0 fL Final          Sodium Sodium   Date Value Ref Range Status   06/23/2024 140 136 - 145 mmol/L Final   06/22/2024 140 136 - 145 mmol/L Final   06/21/2024 139 136 - 145 mmol/L Final   06/20/2024 135 (L) 136 - 145 mmol/L Final      Potassium Potassium   Date Value Ref Range Status   06/23/2024 3.9 3.5 - 5.2 mmol/L Final   06/22/2024 4.2 3.5 - 5.2 mmol/L Final     Comment:     Slight hemolysis detected by analyzer. Result may be falsely elevated.   06/21/2024 4.5 3.5 - 5.2 mmol/L Final   06/20/2024 4.9 3.5 - 5.2 mmol/L Final      Chloride Chloride   Date Value Ref Range Status   06/23/2024 105 98 - 107 mmol/L Final   06/22/2024 104 98 - 107 mmol/L Final   06/21/2024 103 98 - 107 mmol/L Final   06/20/2024 96 (L) 98 - 107 mmol/L Final      CO2 CO2   Date Value Ref Range Status   06/23/2024 21.0 (L) 22.0 - 29.0 mmol/L Final   06/22/2024 23.0 22.0 - 29.0 mmol/L Final   06/21/2024 26.0 22.0 - 29.0 mmol/L Final   06/20/2024 25.0 22.0 - 29.0 mmol/L Final      BUN BUN   Date Value Ref Range Status   06/23/2024 82 (H) 8 - 23 mg/dL Final   06/22/2024 84 (H) 8 - 23 mg/dL Final   06/21/2024 83 (H) 8 - 23 mg/dL Final   06/20/2024 85 (H) 8 - 23 mg/dL Final      Creatinine Creatinine   Date Value Ref Range Status   06/23/2024 5.52 (H) 0.57 - 1.00 " "mg/dL Final   06/22/2024 6.15 (H) 0.57 - 1.00 mg/dL Final   06/21/2024 6.34 (H) 0.57 - 1.00 mg/dL Final   06/20/2024 6.43 (H) 0.57 - 1.00 mg/dL Final      Calcium Calcium   Date Value Ref Range Status   06/23/2024 8.2 (L) 8.6 - 10.5 mg/dL Final   06/22/2024 8.3 (L) 8.6 - 10.5 mg/dL Final   06/21/2024 8.3 (L) 8.6 - 10.5 mg/dL Final   06/20/2024 9.0 8.6 - 10.5 mg/dL Final      PO4 No results found for: \"CAPO4\"   Albumin No results found for: \"ALBUMIN\"     Magnesium Magnesium   Date Value Ref Range Status   06/21/2024 2.0 1.6 - 2.4 mg/dL Final      Uric Acid No results found for: \"URICACID\"     Medication Review: Yes    Assessment & Plan       Renal failure    Acute blood loss anemia    Hyperkalemia    Acute upper GI bleeding    Chronic deep vein thrombosis (DVT)    Primary hypertension    Chronic acquired lymphedema    Chronic cutaneous venous stasis ulcer    Results from last 7 days   Lab Units 06/23/24  0435 06/22/24  1056 06/22/24  0745 06/21/24  2214 06/21/24  0507 06/20/24  1815 06/20/24  1325 06/20/24  0035 06/19/24  1328   SODIUM mmol/L 140 140  --   --  139  --  135* 137 134*   POTASSIUM mmol/L 3.9 4.2  --   --  4.5  --  4.9 5.6* 6.0*   CHLORIDE mmol/L 105 104  --   --  103  --  96* 105 103   CO2 mmol/L 21.0* 23.0  --   --  26.0  --  25.0 19.0* 17.0*   BUN mg/dL 82* 84*  --   --  83*  --  85* 85* 88*   CREATININE mg/dL 5.52* 6.15*  --   --  6.34*  --  6.43* 6.35* 6.77*   CALCIUM mg/dL 8.2* 8.3*  --   --  8.3*  --  9.0 9.1 9.5   ALBUMIN g/dL  --   --   --   --   --   --   --   --  3.2*   WBC 10*3/mm3 7.28  --  8.09  --  8.27  --   --  10.07 12.02*   HEMOGLOBIN g/dL 7.6*  --  8.5*  8.5* 8.4* 6.6*   < >  --  8.0* 5.8*   PLATELETS 10*3/mm3 176  --  174  --  177  --   --  209 232    < > = values in this interval not displayed.       Intake/Output Summary (Last 24 hours) at 6/23/2024 0955  Last data filed at 6/23/2024 0900  Gross per 24 hour   Intake 240 ml   Output --   Net 240 ml         Assessment & Plan     "   Acute kidney injury: Last known stable cr ~ 1.4mg/dl. Cr 6.7 mg/dl on admission. UA large alison esterase+ trace protein blood moderate ( Not a clean catch) UACR - 80.9. Non oliguric - DDx - Pre-quinten/ATN vs AIN - improving.      Hyperkalemia: In the setting of WALTER:      Met acidosis: Due to WALTER    Severe anemia: FOBT+. On AC. GI following.      DANIEL: Likely due to severe anemia     Hx of breast cancer: On anastrozole     Supra-therapeutic INR: On dabigatran.  Concern for Gi bleed. FOBT positive.      Recs  - Continue with gentle IV hydration   - Monitor I/O   - Avoid nephrotoxic agents.   - Renal diet   - Adjust meds per renal function   - No emergent need of RRT   - Monitor H/H and transfuse for Hgb less than 7.0       Jose Schwab MD  06/23/24  09:55 EDT

## 2024-06-23 NOTE — PROGRESS NOTES
[facility] Medicine Services  PROGRESS NOTE    Patient Name: Moon Lerma  : 1955  MRN: [id number]    Date of Admission: 2024  Primary Care Physician: Carmen Singh PA-C    Subjective   Subjective     CC:  Acute renal failure    HPI:  Had a nosebleed yesterday afternoon and a tarry stool overnight.  No abdominal pain.  No nausea or vomiting.  Family at bedside.    Objective   Objective     Vital Signs:   Temp:  [97.2 °F (36.2 °C)-98.3 °F (36.8 °C)] 97.2 °F (36.2 °C)  Heart Rate:  [79-97] 84  Resp:  [16-18] 18  BP: (103-124)/(59-82) 115/66     Physical Exam:  Constitutional: No acute distress, awake, alert, obese, in bed  HENT: NCAT, mucous membranes moist, left-sided tongue ulceration and small right-sided tongue ulceration  Respiratory: Clear to auscultation bilaterally, respiratory effort normal   Cardiovascular: RRR, no murmurs, rubs, or gallops  Gastrointestinal: Positive bowel sounds, soft, nontender, nondistended  Musculoskeletal: No bilateral ankle edema  Psychiatric: Appropriate affect, cooperative  Neurologic: PERRL, symmetric facies, moves all extremities, speech clear    Results Reviewed:  LAB RESULTS:      Lab 24  0436 24  0435 24  0745 24  2214 24  0507 24  1815 24  0035 24  1328   WBC  --  7.28 8.09  --  8.27  --  10.07 12.02*   HEMOGLOBIN  --  7.6* 8.5*  8.5* 8.4* 6.6* 8.0* 8.0* 5.8*   HEMATOCRIT  --  24.0* 26.5*  26.5* 25.3* 20.6* 24.3* 24.6* 18.9*   PLATELETS  --  176 174  --  177  --  209 232   NEUTROS ABS  --   --   --   --   --   --  7.17* 9.14*   IMMATURE GRANS (ABS)  --   --   --   --   --   --  0.07* 0.12*   LYMPHS ABS  --   --   --   --   --   --  2.02 1.81   MONOS ABS  --   --   --   --   --   --  0.57 0.70   EOS ABS  --   --   --   --   --   --  0.22 0.22   MCV  --  90.2 88.6  --  91.6  --  91.8 100.0*   PROTIME 42.5*  --  47.9*  --  70.9*  --   --  82.1*         Lab 24  0435  06/22/24  1056 06/21/24  0507 06/20/24  1325 06/20/24  0035 06/19/24  2219   SODIUM 140 140 139 135* 137  --    POTASSIUM 3.9 4.2 4.5 4.9 5.6*  --    CHLORIDE 105 104 103 96* 105  --    CO2 21.0* 23.0 26.0 25.0 19.0*  --    ANION GAP 14.0 13.0 10.0 14.0 13.0  --    BUN 82* 84* 83* 85* 85*  --    CREATININE 5.52* 6.15* 6.34* 6.43* 6.35*  --    EGFR 8.0* 7.0* 6.7* 6.6* 6.7*  --    GLUCOSE 107* 144* 114* 172* 117*  --    CALCIUM 8.2* 8.3* 8.3* 9.0 9.1  --    MAGNESIUM  --   --  2.0  --   --   --    PHOSPHORUS  --   --  5.3*  --   --   --    HEMOGLOBIN A1C  --   --   --   --   --  5.70*         Lab 06/19/24  1328   TOTAL PROTEIN 7.3   ALBUMIN 3.2*   GLOBULIN 4.1   ALT (SGPT) 22   AST (SGOT) 30   BILIRUBIN 0.3   ALK PHOS 59         Lab 06/23/24  0436 06/22/24  0745 06/21/24  0507 06/19/24  1328   PROBNP  --   --   --  3,437.0*   HSTROP T  --   --   --  26*   PROTIME 42.5* 47.9* 70.9* 82.1*   INR 4.42* 5.15* 8.51* >10.00*             Lab 06/19/24  1422   ABO TYPING O   RH TYPING Positive   ANTIBODY SCREEN Negative         Brief Urine Lab Results  (Last result in the past 365 days)        Color   Clarity   Blood   Leuk Est   Nitrite   Protein   CREAT   Urine HCG        06/20/24 1015             23.5                 Microbiology Results Abnormal       Procedure Component Value - Date/Time    Eosinophil Smear - Urine, Urine, Clean Catch [[id number]]  (Normal) Collected: 06/20/24 1014    Lab Status: Final result Specimen: Urine, Clean Catch Updated: 06/20/24 1412     Eosinophil Smear 0 % EOS/100 Cells     Narrative:      No eosinophil seen            No radiology results from the last 24 hrs        Current medications:  Scheduled Meds:First Mouthwash (Magic Mouthwash), 5 mL, Swish & Spit, Q8H  nebivolol, 2.5 mg, Oral, Q24H  oxymetazoline, 2 spray, Each Nare, BID  pantoprazole, 40 mg, Intravenous, BID AC  sodium chloride, 10 mL, Intravenous, Q12H  terazosin, 2 mg, Oral, Nightly      Continuous Infusions:sodium chloride, 75  mL/hr, Last Rate: 75 mL/hr (06/23/24 0510)      PRN Meds:.  acetaminophen    Biotene dry mouth    senna-docusate sodium **AND** polyethylene glycol **AND** bisacodyl **AND** bisacodyl    sodium chloride    sodium chloride    sodium chloride    Assessment & Plan   Assessment & Plan     Active Hospital Problems    Diagnosis  POA    **Renal failure [N19]  Yes    Acute blood loss anemia [D62]  Unknown    Hyperkalemia [E87.5]  Unknown    Acute upper GI bleeding [K92.2]  Unknown    Chronic deep vein thrombosis (DVT) [I82.509]  Unknown    Primary hypertension [I10]  Unknown    Chronic acquired lymphedema [I89.0]  Unknown    Chronic cutaneous venous stasis ulcer [I83.009, L97.909]  Unknown      Resolved Hospital Problems   No resolved problems to display.        Brief Hospital Course to date:  Moon Lerma is a 68 y.o. female on chronic pradaxa for DVT, reported Cr of 1.4 in April, recent prolonged abx for LE edema and weeping; she presents w/ a Cr of 6.77 and K of 6.0, INR is >10, Hgb of 5.8, and melena; suspect high serum levels of pradaxa from renal failure, which precipitated GIB.      *Of note, family is concerned about patient's lack of insurance coverage while in the [place], they would like her stabilized enough to make the trip back to [facility] and complete the rest of her care there, as much as is reasonable     Acute blood loss anemia  Acute upper GIB  --H/H of 5.8/ 18.9 in ED, hemoglobin in April of 9.8 per records   --s/p 4 units RBC  --GI consulted  -- If melena persists once INR is corrected, then may perform EGD/colonoscopy inpatient.  Can consider this to be performed in [place] if patient stable with medical management  --holding Pradaxa  --PPI  --Patient does not qualify for Pradaxa reversal based on comorbid conditions, does not have critical bleeding at this time  --discussed with GI, s/p vitamin K PO x1 on 6/21 --> INR decreased to 5.15 --> additional PO vitamin K ---> INR decreased to 4.4; 1 additional dose  of vitamin K today suspect nutritional deficiency     Acute renal failure  Hyperkalemia, resolved  Metabolic acidosis   --creatinine in April was 1.4  --creatinine in ED of 6.77 likely from decreased oral intake and anemia  --Nephrology consulted  --Potassium of 6.0, treated with Insulin/ Bicarb/ Dextrose   --started on Bicarb drip, transitioned to NS, rate reduced  --off lokelma     Chronic LLE DVT  --holding Pradaxa for above  --my practice partner discussed with Pharmacy, doesn't need DVT ppx at this time due to long half life of Pradaxa and severe renal disease; no SCDs given DVT    Mouth pain due to tongue ulcerations - improving  --Magic mouthwash    Nosebleed-resolved; as needed Afrin, as needed [employer]     HTN  --cont Nebivolol and Amlodipine daily   --consider stopping Amlodipine for BLE edema   --cont Prazosin nightly      Chronic venous stasis ulcer  Chronic lymphedema  --PT wound for compression wraps and toe ulcerations      Oral ulceration --magic mouthwash   History of breast cancer 2020    Expected Discharge Location and Transportation: home  Expected Discharge   Expected Discharge Date: 6/24/2024; Expected Discharge Time:      VTE Prophylaxis:  Mechanical VTE prophylaxis orders are present.         AM-PAC 6 Clicks Score (PT): 18 (06/22/24 2030)    CODE STATUS:   Code Status and Medical Interventions:   Ordered at: 06/19/24 5898     Level Of Support Discussed With:    Patient     Code Status (Patient has no pulse and is not breathing):    CPR (Attempt to Resuscitate)     Medical Interventions (Patient has pulse or is breathing):    Full Support       Mail Carrillo MD  06/23/24

## 2024-06-23 NOTE — PLAN OF CARE
Problem: Adult Inpatient Plan of Care  Goal: Plan of Care Review  Outcome: Ongoing, Progressing  Goal: Patient-Specific Goal (Individualized)  Outcome: Ongoing, Progressing  Goal: Absence of Hospital-Acquired Illness or Injury  Outcome: Ongoing, Progressing  Intervention: Identify and Manage Fall Risk  Description: Perform standard risk assessment on admission using a validated tool or comprehensive approach appropriate to the patient; reassess fall risk frequently, with change in status or transfer to another level of care.  Communicate fall injury risk to interprofessional healthcare team.  Determine need for increased observation, equipment and environmental modification, such as low bed, signage and supportive, nonskid footwear.  Adjust safety measures to individual developmental age, stage and identified risk factors.  Reinforce the importance of safety and physical activity with patient and family.  Perform regular intentional rounding to assess need for position change, pain assessment and personal needs, including assistance with toileting.  Recent Flowsheet Documentation  Taken 6/23/2024 0400 by Jill Lamar, RN  Safety Promotion/Fall Prevention:   activity supervised   assistive device/personal items within reach   clutter free environment maintained   fall prevention program maintained   lighting adjusted   mobility aid in reach   nonskid shoes/slippers when out of bed   room organization consistent   safety round/check completed  Taken 6/23/2024 0200 by Jill Lamar, RN  Safety Promotion/Fall Prevention:   activity supervised   assistive device/personal items within reach   clutter free environment maintained   fall prevention program maintained   lighting adjusted   mobility aid in reach   nonskid shoes/slippers when out of bed   room organization consistent   safety round/check completed  Taken 6/23/2024 0000 by Jill Lamar, RN  Safety Promotion/Fall Prevention:   activity supervised    assistive device/personal items within reach   clutter free environment maintained   fall prevention program maintained   lighting adjusted   mobility aid in reach   nonskid shoes/slippers when out of bed   room organization consistent   safety round/check completed  Taken 6/22/2024 2200 by Jill Lamar RN  Safety Promotion/Fall Prevention:   activity supervised   assistive device/personal items within reach   clutter free environment maintained   fall prevention program maintained   lighting adjusted   mobility aid in reach   nonskid shoes/slippers when out of bed   room organization consistent   safety round/check completed  Taken 6/22/2024 2030 by Jill Lamar RN  Safety Promotion/Fall Prevention:   activity supervised   assistive device/personal items within reach   clutter free environment maintained   fall prevention program maintained   lighting adjusted   mobility aid in reach   nonskid shoes/slippers when out of bed   room organization consistent   safety round/check completed  Intervention: Prevent Skin Injury  Description: Perform a screening for skin injury risk, such as pressure or moisture associated skin damage on admission and at regular intervals throughout hospital stay.  Keep all areas of skin (especially folds) clean and dry.  Maintain adequate skin hydration.  Relieve and redistribute pressure and protect bony prominences; implement measures based on patient-specific risk factors.  Match turning and repositioning schedule to clinical condition.  Encourage weight shift frequently; assist with reposition if unable to complete independently.  Float heels off bed; avoid pressure on the Achilles tendon.  Keep skin free from extended contact with medical devices.  Encourage functional activity and mobility, as early as tolerated.  Use aids (e.g., slide boards, mechanical lift) during transfer.  Recent Flowsheet Documentation  Taken 6/23/2024 0400 by Jill Lamar RN  Body Position: position  changed independently  Skin Protection:   adhesive use limited   incontinence pads utilized   transparent dressing maintained   skin-to-skin areas padded   skin-to-device areas padded   skin sealant/moisture barrier applied   silicone foam dressing in place   tubing/devices free from skin contact  Taken 6/23/2024 0200 by Jill Lamar RN  Body Position: position changed independently  Skin Protection:   adhesive use limited   tubing/devices free from skin contact   transparent dressing maintained   skin-to-skin areas padded   skin-to-device areas padded   skin sealant/moisture barrier applied   silicone foam dressing in place   incontinence pads utilized  Taken 6/23/2024 0000 by Jill Lamar RN  Body Position: position changed independently  Skin Protection:   adhesive use limited   tubing/devices free from skin contact   transparent dressing maintained   skin-to-skin areas padded   skin-to-device areas padded   skin sealant/moisture barrier applied   silicone foam dressing in place   incontinence pads utilized  Taken 6/22/2024 2200 by Jill Lamar RN  Body Position: position changed independently  Skin Protection:   adhesive use limited   tubing/devices free from skin contact   transparent dressing maintained   skin-to-skin areas padded   skin-to-device areas padded   skin sealant/moisture barrier applied   silicone foam dressing in place   incontinence pads utilized  Taken 6/22/2024 2030 by Jill Lamar RN  Body Position: position changed independently  Skin Protection:   adhesive use limited   tubing/devices free from skin contact   transparent dressing maintained   skin-to-skin areas padded   skin-to-device areas padded   silicone foam dressing in place   skin sealant/moisture barrier applied   incontinence pads utilized  Intervention: Prevent and Manage VTE (Venous Thromboembolism) Risk  Description: Assess for VTE (venous thromboembolism) risk.  Encourage and assist with early ambulation.  Initiate  and maintain compression or other therapy, as indicated, based on identified risk in accordance with organizational protocol and provider order.  Encourage both active and passive leg exercises while in bed, if unable to ambulate.  Recent Flowsheet Documentation  Taken 6/23/2024 0400 by Jill Lamar RN  Activity Management: activity encouraged  Taken 6/23/2024 0200 by Jill Lamar RN  Activity Management: activity encouraged  Taken 6/23/2024 0000 by Jill Lamar RN  Activity Management: activity encouraged  Taken 6/22/2024 2200 by Jill Lamar RN  Activity Management: activity encouraged  Taken 6/22/2024 2030 by Jill Lamar RN  Activity Management: activity encouraged  Intervention: Prevent Infection  Description: Maintain skin and mucous membrane integrity; promote hand, oral and pulmonary hygiene.  Optimize fluid balance, nutrition, sleep and glycemic control to maximize infection resistance.  Identify potential sources of infection early to prevent or mitigate progression of infection (e.g., wound, lines, devices).  Evaluate ongoing need for invasive devices; remove promptly when no longer indicated.  Recent Flowsheet Documentation  Taken 6/22/2024 2030 by Jill Lamar RN  Infection Prevention:   environmental surveillance performed   equipment surfaces disinfected   hand hygiene promoted   personal protective equipment utilized   rest/sleep promoted  Goal: Optimal Comfort and Wellbeing  Outcome: Ongoing, Progressing  Intervention: Provide Person-Centered Care  Description: Use a family-focused approach to care.  Develop trust and rapport by proactively providing information, encouraging questions, addressing concerns and offering reassurance.  Acknowledge emotional response to hospitalization.  Recognize and utilize personal coping strategies.  Honor spiritual and cultural preferences.  Recent Flowsheet Documentation  Taken 6/22/2024 2030 by Jill Lamar RN  Trust Relationship/Rapport:    care explained   choices provided   emotional support provided   empathic listening provided   questions answered   questions encouraged   thoughts/feelings acknowledged  Goal: Readiness for Transition of Care  Outcome: Ongoing, Progressing     Problem: Skin Injury Risk Increased  Goal: Skin Health and Integrity  Outcome: Ongoing, Progressing  Intervention: Optimize Skin Protection  Description: Perform a full pressure injury risk assessment, as indicated by screening, upon admission to care unit.  Reassess skin (injury risk, full inspection) frequently (e.g., scheduled interval, with change in condition) to provide optimal early detection and prevention.  Maintain adequate tissue perfusion (e.g., encourage fluid balance; avoid crossing legs, constrictive clothing or devices) to promote tissue oxygenation.  Maintain head of bed at lowest degree of elevation tolerated, considering medical condition and other restrictions.  Avoid positioning onto an area that remains reddened.  Minimize incontinence and moisture (e.g., toileting schedule; moisture-wicking pad, diaper or incontinence collection device; skin moisture barrier).  Cleanse skin promptly and gently when soiled utilizing a pH-balanced cleanser.  Relieve and redistribute pressure (e.g., scheduled position changes, weight shifts, use of support surface, medical device repositioning, protective dressing application, use of positioning device, microclimate control, use of pressure-injury-monitor  Encourage increased activity, such as sitting in a chair at the bedside or early mobilization, when able to tolerate.  Recent Flowsheet Documentation  Taken 6/23/2024 0400 by Jill Lamar, RN  Pressure Reduction Techniques:   frequent weight shift encouraged   weight shift assistance provided   positioned off wounds   pressure points protected  Pressure Reduction Devices:   pressure-redistributing mattress utilized   positioning supports utilized   heel offloading  device utilized  Skin Protection:   adhesive use limited   incontinence pads utilized   transparent dressing maintained   skin-to-skin areas padded   skin-to-device areas padded   skin sealant/moisture barrier applied   silicone foam dressing in place   tubing/devices free from skin contact  Taken 6/23/2024 0200 by Jill Lamar RN  Pressure Reduction Techniques:   frequent weight shift encouraged   weight shift assistance provided   heels elevated off bed   positioned off wounds   pressure points protected  Pressure Reduction Devices:   pressure-redistributing mattress utilized   positioning supports utilized   heel offloading device utilized  Skin Protection:   adhesive use limited   tubing/devices free from skin contact   transparent dressing maintained   skin-to-skin areas padded   skin-to-device areas padded   skin sealant/moisture barrier applied   silicone foam dressing in place   incontinence pads utilized  Taken 6/23/2024 0000 by Jill Lamar RN  Pressure Reduction Techniques:   frequent weight shift encouraged   weight shift assistance provided   heels elevated off bed   positioned off wounds   pressure points protected  Pressure Reduction Devices:   pressure-redistributing mattress utilized   positioning supports utilized   heel offloading device utilized  Skin Protection:   adhesive use limited   tubing/devices free from skin contact   transparent dressing maintained   skin-to-skin areas padded   skin-to-device areas padded   skin sealant/moisture barrier applied   silicone foam dressing in place   incontinence pads utilized  Taken 6/22/2024 2200 by Jill Lamar RN  Pressure Reduction Techniques:   frequent weight shift encouraged   weight shift assistance provided   heels elevated off bed   positioned off wounds   pressure points protected  Pressure Reduction Devices:   pressure-redistributing mattress utilized   positioning supports utilized   heel offloading device utilized  Skin Protection:    adhesive use limited   tubing/devices free from skin contact   transparent dressing maintained   skin-to-skin areas padded   skin-to-device areas padded   skin sealant/moisture barrier applied   silicone foam dressing in place   incontinence pads utilized  Taken 6/22/2024 2030 by Jill Lamar RN  Pressure Reduction Techniques:   frequent weight shift encouraged   weight shift assistance provided   heels elevated off bed   positioned off wounds   pressure points protected  Head of Bed (HOB) Positioning: HOB at 30-45 degrees  Pressure Reduction Devices:   pressure-redistributing mattress utilized   positioning supports utilized   heel offloading device utilized  Skin Protection:   adhesive use limited   tubing/devices free from skin contact   transparent dressing maintained   skin-to-skin areas padded   skin-to-device areas padded   silicone foam dressing in place   skin sealant/moisture barrier applied   incontinence pads utilized     Problem: Fall Injury Risk  Goal: Absence of Fall and Fall-Related Injury  Outcome: Ongoing, Progressing  Intervention: Identify and Manage Contributors  Description: Develop a fall prevention plan with the patient and caregiver/family.  Provide reorientation, appropriate sensory stimulation and routines with changes in mental status to decrease risk of fall.  Promote use of personal vision and auditory aids.  Assess assistance level required for safe and effective self-care; provide support as needed, such as toileting, mobilization. For age 65 and older, implement timed toileting with assistance.  Encourage physical activity, such as performance of mobility and self-care at highest level of patient ability, multicomponent exercise program and provision of appropriate assistive devices.  If fall occurs, assess the severity of injury; implement fall injury protocol. Determine the cause and revise fall injury prevention plan.  Regularly review medication contribution to fall risk;  adjust medication administration times to minimize risk of falling.  Consider risk related to polypharmacy and age.  Balance adequate pain management with potential for oversedation.  Recent Flowsheet Documentation  Taken 6/23/2024 0400 by Jill Lamar RN  Medication Review/Management: medications reviewed  Taken 6/23/2024 0200 by Jill Lamar RN  Medication Review/Management: medications reviewed  Taken 6/23/2024 0000 by Jill Lamar RN  Medication Review/Management: medications reviewed  Taken 6/22/2024 2200 by Jill Lamar RN  Medication Review/Management: medications reviewed  Taken 6/22/2024 2030 by Jill Lamar RN  Medication Review/Management: medications reviewed  Intervention: Promote Injury-Free Environment  Description: Provide a safe, barrier-free environment that encourages independent activity.  Keep care area uncluttered and well-lighted.  Determine need for increased observation or monitoring.  Avoid use of devices that minimize mobility, such as restraints or indwelling urinary catheter.  Recent Flowsheet Documentation  Taken 6/23/2024 0400 by Jill Lamar RN  Safety Promotion/Fall Prevention:   activity supervised   assistive device/personal items within reach   clutter free environment maintained   fall prevention program maintained   lighting adjusted   mobility aid in reach   nonskid shoes/slippers when out of bed   room organization consistent   safety round/check completed  Taken 6/23/2024 0200 by Jill Lamar RN  Safety Promotion/Fall Prevention:   activity supervised   assistive device/personal items within reach   clutter free environment maintained   fall prevention program maintained   lighting adjusted   mobility aid in reach   nonskid shoes/slippers when out of bed   room organization consistent   safety round/check completed  Taken 6/23/2024 0000 by Jill Lamar RN  Safety Promotion/Fall Prevention:   activity supervised   assistive device/personal items within  reach   clutter free environment maintained   fall prevention program maintained   lighting adjusted   mobility aid in reach   nonskid shoes/slippers when out of bed   room organization consistent   safety round/check completed  Taken 6/22/2024 2200 by Jill Lamar, RN  Safety Promotion/Fall Prevention:   activity supervised   assistive device/personal items within reach   clutter free environment maintained   fall prevention program maintained   lighting adjusted   mobility aid in reach   nonskid shoes/slippers when out of bed   room organization consistent   safety round/check completed  Taken 6/22/2024 2030 by Jill Lamar, RN  Safety Promotion/Fall Prevention:   activity supervised   assistive device/personal items within reach   clutter free environment maintained   fall prevention program maintained   lighting adjusted   mobility aid in reach   nonskid shoes/slippers when out of bed   room organization consistent   safety round/check completed   Goal Outcome Evaluation:

## 2024-06-23 NOTE — THERAPY TREATMENT NOTE
Patient Name: Moon Lerma  : 1955    MRN: [id number]                              Today's Date: 2024       Admit Date: 2024    Visit Dx:     ICD-10-CM ICD-9-CM   1. Acute blood loss anemia  D62 285.1   2. Acute renal failure, unspecified acute renal failure type  N17.9 584.9   3. Acute hyperkalemia  E87.5 276.7   4. Acute GI bleeding  K92.2 578.9     Patient Active Problem List   Diagnosis    Renal failure    Acute blood loss anemia    Hyperkalemia    Acute upper GI bleeding    Chronic deep vein thrombosis (DVT)    Primary hypertension    Chronic acquired lymphedema    Chronic cutaneous venous stasis ulcer     Past Medical History:   Diagnosis Date    Breast cancer     Clotting disorder     DVT (deep venous thrombosis)     Hypertension      Past Surgical History:   Procedure Laterality Date    MASTECTOMY, PARTIAL      left breast      General Information       Row Name 24 1027          Physical Therapy Time and Intention    Document Type therapy note (daily note)  -AS     Mode of Treatment physical therapy  -AS       Row Name 24 1027          General Information    Patient Profile Reviewed yes  -AS     Existing Precautions/Restrictions fall;other (see comments)  patient and family refused interpretor cart, requested to interpret for patient.  -AS     Barriers to Rehab language barrier;previous functional deficit  -AS       Row Name 24 1027          Cognition    Orientation Status (Cognition) oriented x 3  -AS       Row Name 24 1027          Safety Issues, Functional Mobility    Safety Issues Affecting Function (Mobility) awareness of need for assistance;insight into deficits/self-awareness;positioning of assistive device;safety precautions follow-through/compliance  -AS     Impairments Affecting Function (Mobility) balance;endurance/activity tolerance;pain;strength  -AS     Comment, Safety Issues/Impairments (Mobility) alert and following commands  -AS               User  Key  (r) = Recorded By, (t) = Taken By, (c) = Cosigned By      Initials Name Provider Type    AS Luiza Blackburn PTA Physical Therapist Assistant                   Mobility       Row Name 06/23/24 1028          Bed Mobility    Supine-Sit Weir (Bed Mobility) verbal cues;minimum assist (75% patient effort);1 person assist  -AS     Assistive Device (Bed Mobility) head of bed elevated;bed rails  -AS     Comment, (Bed Mobility) cues for sequencing, increased time and effort to reach EOB, assist at trunk to reach EOB  -AS       Row Name 06/23/24 1028          Transfers    Comment, (Transfers) max cues for hand placement, depsite daughter interpreting patient pulled up on walker  -AS       Row Name 06/23/24 1028          Bed-Chair Transfer    Bed-Chair Weir (Transfers) verbal cues;minimum assist (75% patient effort);1 person assist  -AS     Assistive Device (Bed-Chair Transfers) walker, front-wheeled  -AS       Row Name 06/23/24 1028          Sit-Stand Transfer    Sit-Stand Weir (Transfers) verbal cues;minimum assist (75% patient effort);1 person assist  -AS     Assistive Device (Sit-Stand Transfers) walker, front-wheeled  -AS     Comment, (Sit-Stand Transfer) cues for hand placement  -AS       Row Name 06/23/24 1028          Gait/Stairs (Locomotion)    Weir Level (Gait) verbal cues;contact guard;1 person assist  -AS     Assistive Device (Gait) walker, front-wheeled  -AS     Distance in Feet (Gait) 40  + 40  -AS     Deviations/Abnormal Patterns (Gait) base of support, wide;shelly decreased;gait speed decreased;bilateral deviations  -AS     Bilateral Gait Deviations heel strike decreased  -AS     Comment, (Gait/Stairs) patient ambulated 40' + 40' with CGA x1 and rolling walker for support, verbal cues for walker placement and upright posture, no LOB or unsteadiness noticed. SaO2 97% on RA post ambulation in hallway.  -AS               User Key  (r) = Recorded By, (t) = Taken By, (c) =  Cosigned By      Initials Name Provider Type    AS Luiza Blackburn PTA Physical Therapist Assistant                   Obj/Interventions       Row Name 06/23/24 1036          Motor Skills    Therapeutic Exercise knee;ankle  -AS       Row Name 06/23/24 1036          Knee (Therapeutic Exercise)    Knee (Therapeutic Exercise) strengthening exercise  -AS     Knee Strengthening (Therapeutic Exercise) bilateral;LAQ (long arc quad);sitting;10 repetitions  -AS       Row Name 06/23/24 1036          Ankle (Therapeutic Exercise)    Ankle (Therapeutic Exercise) AROM (active range of motion)  -AS     Ankle AROM (Therapeutic Exercise) bilateral;dorsiflexion;plantarflexion;sitting;10 repetitions  -AS       Row Name 06/23/24 1036          Balance    Dynamic Standing Balance verbal cues;contact guard;1-person assist  -AS     Position/Device Used, Standing Balance supported;walker, front-wheeled  -AS     Comment, Balance CGA for safety  -AS               User Key  (r) = Recorded By, (t) = Taken By, (c) = Cosigned By      Initials Name Provider Type    AS Luiza Blackburn PTA Physical Therapist Assistant                   Goals/Plan    No documentation.                  Clinical Impression       Row Name 06/23/24 1037          Pain    Pretreatment Pain Rating 0/10 - no pain  -AS     Posttreatment Pain Rating 0/10 - no pain  -AS       Row Name 06/23/24 1037          Plan of Care Review    Plan of Care Reviewed With patient  -AS     Progress improving  -AS     Outcome Evaluation patient ambulated 40' + 40' with CGA x1 and rolling walker for support, verbal cues for walker placement and upright posture, no LOB or unsteadiness noticed. SaO2 97% on RA post ambulation in hallway. Educated patient and family to ask nursing to ambulate throughout day if able. Recommend home with assist at D/C.  -AS       Row Name 06/23/24 1037          Vital Signs    Post SpO2 (%) 97  -AS     O2 Delivery Post Treatment room air  -AS     Post Patient  Position Sitting  -AS       Row Name 06/23/24 1037          Positioning and Restraints    Pre-Treatment Position in bed  -AS     Post Treatment Position chair  -AS     In Chair sitting;call light within reach;encouraged to call for assist;with family/caregiver  -AS               User Key  (r) = Recorded By, (t) = Taken By, (c) = Cosigned By      Initials Name Provider Type    AS Luiza Blackburn PTA Physical Therapist Assistant                   Outcome Measures       Row Name 06/23/24 1038          How much help from another person do you currently need...    Turning from your back to your side while in flat bed without using bedrails? 3  -AS     Moving from lying on back to sitting on the side of a flat bed without bedrails? 3  -AS     Moving to and from a bed to a chair (including a wheelchair)? 3  -AS     Standing up from a chair using your arms (e.g., wheelchair, bedside chair)? 3  -AS     Climbing 3-5 steps with a railing? 3  -AS     To walk in hospital room? 3  -AS     AM-PAC 6 Clicks Score (PT) 18  -AS     Highest Level of Mobility Goal 6 --> Walk 10 steps or more  -AS       Row Name 06/23/24 1038          Functional Assessment    Outcome Measure Options AM-PAC 6 Clicks Basic Mobility (PT)  -AS               User Key  (r) = Recorded By, (t) = Taken By, (c) = Cosigned By      Initials Name Provider Type    AS Luiza Blackburn PTA Physical Therapist Assistant                                 Physical Therapy Education       Title: PT OT SLP Therapies (In Progress)       Topic: Physical Therapy (In Progress)       Point: Mobility training (In Progress)       Learning Progress Summary             Patient Acceptance, E, NR by AS at 6/23/2024 1038    Acceptance, E, NR by NORMA at 6/20/2024 1049                         Point: Home exercise program (In Progress)       Learning Progress Summary             Patient Acceptance, E, NR by AS at 6/23/2024 1038                         Point: Body mechanics (In  Progress)       Learning Progress Summary             Patient Acceptance, E, NR by AS at 6/23/2024 1038                         Point: Precautions (In Progress)       Learning Progress Summary             Patient Acceptance, E, NR by AS at 6/23/2024 1038    Acceptance, E, NR by LM at 6/20/2024 1049                                         User Key       Initials Effective Dates Name Provider Type Discipline    AS 04/28/23 -  Luiza Blackburn PTA Physical Therapist Assistant PT    LM 07/11/23 -  Carrie Diaz PT Physical Therapist PT                  PT Recommendation and Plan     Plan of Care Reviewed With: patient  Progress: improving  Outcome Evaluation: patient ambulated 40' + 40' with CGA x1 and rolling walker for support, verbal cues for walker placement and upright posture, no LOB or unsteadiness noticed. SaO2 97% on RA post ambulation in hallway. Educated patient and family to ask nursing to ambulate throughout day if able. Recommend home with assist at D/C.     Time Calculation:         PT Charges       Row Name 06/23/24 1039             Time Calculation    Start Time 0958  -AS      PT Received On 06/23/24  -AS      PT Goal Re-Cert Due Date 06/30/24  -AS         Timed Charges    04364 - PT Therapeutic Exercise Minutes 8  -AS      11502 - Gait Training Minutes  15  -AS         Total Minutes    Timed Charges Total Minutes 23  -AS       Total Minutes 23  -AS                User Key  (r) = Recorded By, (t) = Taken By, (c) = Cosigned By      Initials Name Provider Type    AS Luiza Blackburn PTA Physical Therapist Assistant                  Therapy Charges for Today       Code Description Service Date Service Provider Modifiers Qty    94022128741 HC PT THER PROC EA 15 MIN 6/23/2024 Luiza Blackburn PTA GP 1    03537984210 HC GAIT TRAINING EA 15 MIN 6/23/2024 Luiza Blackburn PTA GP 1            PT G-Codes  Outcome Measure Options: AM-PAC 6 Clicks Basic Mobility (PT)  AM-PAC 6 Clicks Score (PT):  18  AM-PAC 6 Clicks Score (OT): 15       Luiza Blackburn, PTA  6/23/2024

## 2024-06-23 NOTE — PLAN OF CARE
Goal Outcome Evaluation:  Plan of Care Reviewed With: patient        Progress: improving  Outcome Evaluation: patient ambulated 40' + 40' with CGA x1 and rolling walker for support, verbal cues for walker placement and upright posture, no LOB or unsteadiness noticed. SaO2 97% on RA post ambulation in hallway. Educated patient and family to ask nursing to ambulate throughout day if able. Recommend home with assist at D/C.                                Patient will be on levofloxacin for pseudomonas bronchitis. She will have a walk in ECG Mon to assess QT interval.

## 2024-06-23 NOTE — PLAN OF CARE
Goal Outcome Evaluation:  Plan of Care Reviewed With: patient, family        Progress: improving     A &O x4. VSS. Non tele. O2 sat >95% on RA. Denies CP. Denies N/V/D. Afebrile. 1PA to BR with walker. Small formed dark brown BM x1 this shift. Monitor s/s of bleeding. Family at bedside. No other concerns assessed or stated.

## 2024-06-24 LAB
ABO GROUP BLD: NORMAL
ANION GAP SERPL CALCULATED.3IONS-SCNC: 12 MMOL/L (ref 5–15)
BLD GP AB SCN SERPL QL: NEGATIVE
BUN SERPL-MCNC: 80 MG/DL (ref 8–23)
BUN/CREAT SERPL: 14.8 (ref 7–25)
CALCIUM SPEC-SCNC: 8.2 MG/DL (ref 8.6–10.5)
CHLORIDE SERPL-SCNC: 105 MMOL/L (ref 98–107)
CO2 SERPL-SCNC: 22 MMOL/L (ref 22–29)
CREAT SERPL-MCNC: 5.42 MG/DL (ref 0.57–1)
DEPRECATED RDW RBC AUTO: 55.9 FL (ref 37–54)
EGFRCR SERPLBLD CKD-EPI 2021: 8.1 ML/MIN/1.73
ERYTHROCYTE [DISTWIDTH] IN BLOOD BY AUTOMATED COUNT: 16.6 % (ref 12.3–15.4)
GLUCOSE SERPL-MCNC: 110 MG/DL (ref 65–99)
HCT VFR BLD AUTO: 20.6 % (ref 34–46.6)
HCT VFR BLD AUTO: 25 % (ref 34–46.6)
HGB BLD-MCNC: 6.6 G/DL (ref 12–15.9)
HGB BLD-MCNC: 8 G/DL (ref 12–15.9)
INR PPP: 3.47 (ref 0.89–1.12)
MCH RBC QN AUTO: 29.7 PG (ref 26.6–33)
MCHC RBC AUTO-ENTMCNC: 32 G/DL (ref 31.5–35.7)
MCV RBC AUTO: 92.8 FL (ref 79–97)
PLATELET # BLD AUTO: 147 10*3/MM3 (ref 140–450)
PMV BLD AUTO: 11.1 FL (ref 6–12)
POTASSIUM SERPL-SCNC: 4 MMOL/L (ref 3.5–5.2)
PROTHROMBIN TIME: 35.2 SECONDS (ref 12.2–14.5)
RBC # BLD AUTO: 2.22 10*6/MM3 (ref 3.77–5.28)
RH BLD: POSITIVE
SODIUM SERPL-SCNC: 139 MMOL/L (ref 136–145)
T&S EXPIRATION DATE: NORMAL
WBC NRBC COR # BLD AUTO: 6.48 10*3/MM3 (ref 3.4–10.8)

## 2024-06-24 PROCEDURE — 86850 RBC ANTIBODY SCREEN: CPT | Performed by: NURSE PRACTITIONER

## 2024-06-24 PROCEDURE — 80048 BASIC METABOLIC PNL TOTAL CA: CPT | Performed by: STUDENT IN AN ORGANIZED HEALTH CARE EDUCATION/TRAINING PROGRAM

## 2024-06-24 PROCEDURE — 85018 HEMOGLOBIN: CPT | Performed by: STUDENT IN AN ORGANIZED HEALTH CARE EDUCATION/TRAINING PROGRAM

## 2024-06-24 PROCEDURE — P9016 RBC LEUKOCYTES REDUCED: HCPCS

## 2024-06-24 PROCEDURE — 86901 BLOOD TYPING SEROLOGIC RH(D): CPT | Performed by: NURSE PRACTITIONER

## 2024-06-24 PROCEDURE — 25010000002 PHYTONADIONE 10 MG/ML SOLUTION: Performed by: STUDENT IN AN ORGANIZED HEALTH CARE EDUCATION/TRAINING PROGRAM

## 2024-06-24 PROCEDURE — 36430 TRANSFUSION BLD/BLD COMPNT: CPT

## 2024-06-24 PROCEDURE — 25810000003 LACTATED RINGERS PER 1000 ML: Performed by: INTERNAL MEDICINE

## 2024-06-24 PROCEDURE — 85027 COMPLETE CBC AUTOMATED: CPT | Performed by: STUDENT IN AN ORGANIZED HEALTH CARE EDUCATION/TRAINING PROGRAM

## 2024-06-24 PROCEDURE — 85014 HEMATOCRIT: CPT | Performed by: STUDENT IN AN ORGANIZED HEALTH CARE EDUCATION/TRAINING PROGRAM

## 2024-06-24 PROCEDURE — 86900 BLOOD TYPING SEROLOGIC ABO: CPT | Performed by: NURSE PRACTITIONER

## 2024-06-24 PROCEDURE — 85610 PROTHROMBIN TIME: CPT | Performed by: STUDENT IN AN ORGANIZED HEALTH CARE EDUCATION/TRAINING PROGRAM

## 2024-06-24 PROCEDURE — 99232 SBSQ HOSP IP/OBS MODERATE 35: CPT | Performed by: STUDENT IN AN ORGANIZED HEALTH CARE EDUCATION/TRAINING PROGRAM

## 2024-06-24 PROCEDURE — 86900 BLOOD TYPING SEROLOGIC ABO: CPT

## 2024-06-24 PROCEDURE — 86923 COMPATIBILITY TEST ELECTRIC: CPT

## 2024-06-24 RX ORDER — PHYTONADIONE 2 MG/ML
5 INJECTION, EMULSION INTRAMUSCULAR; INTRAVENOUS; SUBCUTANEOUS ONCE
Status: COMPLETED | OUTPATIENT
Start: 2024-06-24 | End: 2024-06-24

## 2024-06-24 RX ADMIN — DIPHENHYDRAMINE HYDROCHLORIDE AND LIDOCAINE HYDROCHLORIDE AND ALUMINUM HYDROXIDE AND MAGNESIUM HYDRO 5 ML: KIT at 13:25

## 2024-06-24 RX ADMIN — SODIUM CHLORIDE, POTASSIUM CHLORIDE, SODIUM LACTATE AND CALCIUM CHLORIDE 75 ML/HR: 600; 310; 30; 20 INJECTION, SOLUTION INTRAVENOUS at 00:35

## 2024-06-24 RX ADMIN — DIPHENHYDRAMINE HYDROCHLORIDE AND LIDOCAINE HYDROCHLORIDE AND ALUMINUM HYDROXIDE AND MAGNESIUM HYDRO 5 ML: KIT at 23:13

## 2024-06-24 RX ADMIN — DIPHENHYDRAMINE HYDROCHLORIDE AND LIDOCAINE HYDROCHLORIDE AND ALUMINUM HYDROXIDE AND MAGNESIUM HYDRO 5 ML: KIT at 06:25

## 2024-06-24 RX ADMIN — PHYTONADIONE 5 MG: 10 INJECTION, EMULSION INTRAMUSCULAR; INTRAVENOUS; SUBCUTANEOUS at 12:43

## 2024-06-24 RX ADMIN — Medication 10 ML: at 21:22

## 2024-06-24 RX ADMIN — ACETAMINOPHEN 650 MG: 325 TABLET, FILM COATED ORAL at 12:42

## 2024-06-24 RX ADMIN — PANTOPRAZOLE SODIUM 40 MG: 40 INJECTION, POWDER, FOR SOLUTION INTRAVENOUS at 08:24

## 2024-06-24 RX ADMIN — TERAZOSIN HYDROCHLORIDE 2 MG: 2 CAPSULE ORAL at 21:21

## 2024-06-24 RX ADMIN — NEBIVOLOL 2.5 MG: 2.5 TABLET ORAL at 08:24

## 2024-06-24 RX ADMIN — SODIUM CHLORIDE, POTASSIUM CHLORIDE, SODIUM LACTATE AND CALCIUM CHLORIDE 75 ML/HR: 600; 310; 30; 20 INJECTION, SOLUTION INTRAVENOUS at 17:26

## 2024-06-24 RX ADMIN — PANTOPRAZOLE SODIUM 40 MG: 40 INJECTION, POWDER, FOR SOLUTION INTRAVENOUS at 17:26

## 2024-06-24 RX ADMIN — Medication 10 ML: at 08:25

## 2024-06-24 NOTE — PAYOR COMM NOTE
"Moon Lerma (68 y.o. Female)    Ref #[id number]   Date of Birth   1955    Social Security Number       Address   [address] [place] [address]    Home Phone   [phone]    MRN   [id number]       Pentecostalism   None    Marital Status                               Admission Date   6/19/24    Admission Type   Emergency    Admitting Provider   Mali Carrillo MD    Attending Provider   Mali Carrillo MD    Department, Room/Bed   [address], S581/1       Discharge Date       Discharge Disposition       Discharge Destination                                 Attending Provider: Mali Carrillo MD    Allergies: No Known Allergies    Isolation: None   Infection: None   Code Status: CPR    Ht: 152.4 cm (60\")   Wt: 128 kg (282 lb 3 oz)    Admission Cmt: None   Principal Problem: Renal failure [N19]                   Active Insurance as of 6/19/2024       Primary Coverage       Payor Plan Insurance Group Employer/Plan Group    [employer]ISC [place] M[employer] COMMERCIAL [id number]       Coverage Address Coverage Phone Number Coverage Fax Number Effective Dates    [address] [address] [phone]  6/8/2024 - None Entered    [facility] [id number]         Subscriber Name Subscriber Birth Date Member ID       MOON LERMA 1955 [id number]                     Emergency Contacts        (Rel.) Home Phone Work Phone Mobile Phone    SHOLA ONTIVEROS (Daughter) [phone] -- --    PANFILOWONMARIO BACA (Other) [phone] -- --              [place]: [facility] [id number] Tax ID [id number]  Insurance Information                  [employer] Phone: [phone]    Subscriber: Moon Lerma Subscriber#: [id number]    Group#: [id number] Precert#: --          Current Facility-Administered Medications   Medication Dose Route Frequency Provider Last Rate Last Admin    acetaminophen (TYLENOL) tablet 650 mg  650 mg Oral Q6H PRN Mali Carrillo MD   650 mg at 06/21/24 1231    Biotene dry mouth " liquid 15 mL  15 mL Swish & Spit 5x Daily PRN Mali Carrillo MD        sennosides-docusate (PERICOLACE) 8.6-50 MG per tablet 2 tablet  2 tablet Oral BID PRN Negrita Padilla APRN        And    polyethylene glycol (MIRALAX) packet 17 g  17 g Oral Daily PRN Negrita Padilla APRN        And    bisacodyl (DULCOLAX) EC tablet 5 mg  5 mg Oral Daily PRN Negrita Padilla APRN        And    bisacodyl (DULCOLAX) suppository 10 mg  10 mg Rectal Daily PRN Negrita Padilla APRN        First Mouthwash (Magic Mouthwash) 5 mL  5 mL Swish & Spit Q8H Mali Carrillo MD   5 mL at 06/24/24 0625    lactated ringers infusion  75 mL/hr Intravenous Continuous Zaheer, Jose Harris MD 75 mL/hr at 06/24/24 0035 75 mL/hr at 06/24/24 0035    nebivolol (BYSTOLIC) tablet 2.5 mg  2.5 mg Oral Q24H Negrita Padilla APRN   2.5 mg at 06/24/24 0824    oxymetazoline (AFRIN) nasal spray 2 spray  2 spray Each Nare BID PRN Mali Carrillo MD        pantoprazole (PROTONIX) injection 40 mg  40 mg Intravenous BID AC Rosanna Anthony PA   40 mg at 06/24/24 0824    sodium chloride 0.9 % flush 10 mL  10 mL Intravenous PRN Emerald Bravo MD        sodium chloride 0.9 % flush 10 mL  10 mL Intravenous Q12H Negrita Padilla APRN   10 mL at 06/24/24 0825    sodium chloride 0.9 % flush 10 mL  10 mL Intravenous PRN Negrita Padilla APRN        sodium chloride 0.9 % infusion 40 mL  40 mL Intravenous PRN Negrita Padilla APRN        sodium chloride nasal spray 1 spray  1 spray Each Nare PRN Mali Carrillo MD        terazosin (HYTRIN) capsule 2 mg  2 mg Oral Nightly Negrita Padilla APRN   2 mg at 06/23/24 2131     Lab Results (last 24 hours)       Procedure Component Value Units Date/Time    Basic Metabolic Panel [[id number]]  (Abnormal) Collected: 06/24/24 0436    Specimen: Blood Updated: 06/24/24 0537     Glucose 110 mg/dL      BUN 80 mg/dL      Creatinine 5.42 mg/dL      Sodium 139 mmol/L      Potassium 4.0 mmol/L      Chloride 105 mmol/L      CO2 22.0 mmol/L       Calcium 8.2 mg/dL      BUN/Creatinine Ratio 14.8     Anion Gap 12.0 mmol/L      eGFR 8.1 mL/min/1.73      Comment: <15 Indicative of kidney failure       Narrative:      GFR Normal >60  Chronic Kidney Disease <60  Kidney Failure <15      Protime-INR [[id number]]  (Abnormal) Collected: 06/24/24 0436    Specimen: Blood Updated: 06/24/24 0522     Protime 35.2 Seconds      INR 3.47    CBC (No Diff) [[id number]]  (Abnormal) Collected: 06/24/24 0436    Specimen: Blood Updated: 06/24/24 0509     WBC 6.48 10*3/mm3      RBC 2.22 10*6/mm3      Hemoglobin 6.6 g/dL      Hematocrit 20.6 %      MCV 92.8 fL      MCH 29.7 pg      MCHC 32.0 g/dL      RDW 16.6 %      RDW-SD 55.9 fl      MPV 11.1 fL      Platelets 147 10*3/mm3           Imaging Results (Last 24 Hours)       ** No results found for the last 24 hours. **          Orders (last 24 hrs)        Start     Ordered    06/24/24 0622  Prepare RBC, 1 Units  Blood - Once         06/24/24 0621    06/24/24 0622  Type & Screen  Once,   Status:  Canceled         06/24/24 0621    06/24/24 0621  Transfuse RBC Infuse Each Unit Over: 3.5H  Transfusion         06/24/24 0621    06/24/24 0621  Type & Screen  STAT         06/24/24 0621    06/24/24 0621  Verify Informed Consent for Blood Product Administration  Once         06/24/24 0621    06/24/24 0600  CBC (No Diff)  Morning Draw         06/23/24 1134    06/24/24 0600  Protime-INR  Morning Draw         06/23/24 1134    06/24/24 0600  Basic Metabolic Panel  Morning Draw         06/23/24 1134    06/23/24 1345  albumin human 25 % IV SOLN 25 g  Once         06/23/24 1247    06/23/24 1230  vitamin K1 (PHYTONADIONE) 1 MG/1 ML oral solution 5 mg  Once         06/23/24 1134    06/23/24 1145  oxymetazoline (AFRIN) nasal spray 2 spray  2 Times Daily PRN         06/23/24 1134    06/23/24 1133  sodium chloride nasal spray 1 spray  As Needed         06/23/24 1134    06/23/24 1045  lactated ringers infusion  Continuous         06/23/24 0905     "06/22/24 2100  oxymetazoline (AFRIN) nasal spray 2 spray  2 Times Daily,   Status:  Discontinued         06/22/24 1653    06/21/24 1400  First Mouthwash (Magic Mouthwash) 5 mL  Every 8 Hours Scheduled         06/21/24 1340    06/21/24 1115  Biotene dry mouth liquid 15 mL  5 Times Daily PRN         06/21/24 1115    06/20/24 1730  pantoprazole (PROTONIX) injection 40 mg  2 Times Daily Before Meals         06/20/24 0932    06/20/24 1500  sodium chloride 0.9 % infusion  Continuous,   Status:  Discontinued         06/20/24 1401    06/20/24 0900  nebivolol (BYSTOLIC) tablet 2.5 mg  Every 24 Hours Scheduled         06/19/24 1817 06/20/24 0800  acetaminophen (TYLENOL) tablet 650 mg  Every 6 Hours PRN         06/20/24 0800    06/20/24 0800  Oral Care  2 Times Daily       06/19/24 1817 06/19/24 2100  terazosin (HYTRIN) capsule 2 mg  Nightly         06/19/24 1817 06/19/24 2100  sodium chloride 0.9 % flush 10 mL  Every 12 Hours Scheduled         06/19/24 1817 06/19/24 2000  Vital Signs  Every 4 Hours       06/19/24 1817 06/19/24 1818  Intake & Output  Every Shift       06/19/24 1817 06/19/24 1817  sodium chloride 0.9 % flush 10 mL  As Needed         06/19/24 1817 06/19/24 1817  sodium chloride 0.9 % infusion 40 mL  As Needed         06/19/24 1817 06/19/24 1817  sennosides-docusate (PERICOLACE) 8.6-50 MG per tablet 2 tablet  2 Times Daily PRN        Placed in \"And\" Linked Group    06/19/24 1817    06/19/24 1817  polyethylene glycol (MIRALAX) packet 17 g  Daily PRN        Placed in \"And\" Linked Group    06/19/24 1817 06/19/24 1817  bisacodyl (DULCOLAX) EC tablet 5 mg  Daily PRN        Placed in \"And\" Linked Group    06/19/24 1817 06/19/24 1817  bisacodyl (DULCOLAX) suppository 10 mg  Daily PRN        Placed in \"And\" Linked Group    06/19/24 1817    06/19/24 1319  sodium chloride 0.9 % flush 10 mL  As Needed         06/19/24 1319    Unscheduled  Oxygen Therapy- Nasal Cannula; Titrate 1-6 LPM Per SpO2; " "90 - 95%  Continuous PRN       06/19/24 1319    Unscheduled  Up With Assistance  As Needed       06/19/24 1817    Unscheduled  Transfuse RBC, 2 Units Infuse Each Unit Over: 3.5H  Transfusion       06/21/24 0631    Unscheduled  Transfuse RBC, 2 Units Infuse Each Unit Over: 3.5H  Transfusion       06/21/24 0959    --  Fulvestrant (FASLODEX) 250 MG/5ML chemo syringe  Every 30 Days         06/19/24 1907    --  rosuvastatin (CRESTOR) 10 MG tablet  Nightly         06/19/24 1908                     Physician Progress Notes (last 24 hours)        Jose Schwab MD at 06/23/24 0955           LOS: 4 days   Patient Care Team:  Carmen Singh PA-C as PCP - General (Physician Assistant)    Chief Complaint: Acute kidney injury   Anemia  GI bleed.    No CP or SOA , fever, chills, rigors, rash, N/V, Constipation.       Subjective     No acute events overnight. No new complaints         History taken from: patient    Objective     Vital Sign Min/Max for last 24 hours  Temp  Min: 97.2 °F (36.2 °C)  Max: 98.3 °F (36.8 °C)   BP  Min: 103/67  Max: 124/73   Pulse  Min: 79  Max: 97   Resp  Min: 16  Max: 18   SpO2  Min: 97 %  Max: 100 %   No data recorded   No data recorded     Flowsheet Rows      Flowsheet Row First Filed Value   Admission Height 152.4 cm (60\") Documented at 06/19/2024 1315   Admission Weight 128 kg (282 lb 3 oz) Documented at 06/19/2024 1315            I/O this shift:  In: 240 [P.O.:240]  Out: -   I/O last 3 completed shifts:  In: 120 [P.O.:120]  Out: -     Vitals:    06/23/24 0414 06/23/24 0753 06/23/24 0817 06/23/24 1132   BP: 123/82 124/73 115/66 128/77   BP Location: Left arm  Left arm Left arm   Patient Position: Lying  Lying Sitting   Pulse: 97  84 78   Resp: 18  18 18   Temp: 98.2 °F (36.8 °C)  97.2 °F (36.2 °C) 97.5 °F (36.4 °C)   TempSrc: Oral  Oral Oral   SpO2: 100%  100% 100%   Weight:       Height:             Gen: Alert, NAD   HENT: NC, AT, EOMI   NECK: Supple, no JVD, Trachea midline " "  LUNGS: CTA bilaterally, non labored respirtation   CVS: S1/S2 audible, RRR, no murmur   Abd: Soft, NT, ND, BS+   Ext: No pedal edema, no cyanosis   CNS: Alert, No focal deficit noted grossly  Psy: Cooperative  Skin: Warm, dry and intact        Results Review:     I reviewed the patient's new clinical results.    WBC WBC   Date Value Ref Range Status   06/23/2024 7.28 3.40 - 10.80 10*3/mm3 Final   06/22/2024 8.09 3.40 - 10.80 10*3/mm3 Final   06/21/2024 8.27 3.40 - 10.80 10*3/mm3 Final      HGB Hemoglobin   Date Value Ref Range Status   06/23/2024 7.6 (L) 12.0 - 15.9 g/dL Final   06/22/2024 8.5 (L) 12.0 - 15.9 g/dL Final   06/22/2024 8.5 (L) 12.0 - 15.9 g/dL Final   06/21/2024 8.4 (L) 12.0 - 15.9 g/dL Final   06/21/2024 6.6 (C) 12.0 - 15.9 g/dL Final   06/20/2024 8.0 (L) 12.0 - 15.9 g/dL Final      HCT Hematocrit   Date Value Ref Range Status   06/23/2024 24.0 (L) 34.0 - 46.6 % Final   06/22/2024 26.5 (L) 34.0 - 46.6 % Final   06/22/2024 26.5 (L) 34.0 - 46.6 % Final   06/21/2024 25.3 (L) 34.0 - 46.6 % Final   06/21/2024 20.6 (C) 34.0 - 46.6 % Final   06/20/2024 24.3 (L) 34.0 - 46.6 % Final      Platlets No results found for: \"LABPLAT\"   MCV MCV   Date Value Ref Range Status   06/23/2024 90.2 79.0 - 97.0 fL Final   06/22/2024 88.6 79.0 - 97.0 fL Final   06/21/2024 91.6 79.0 - 97.0 fL Final          Sodium Sodium   Date Value Ref Range Status   06/23/2024 140 136 - 145 mmol/L Final   06/22/2024 140 136 - 145 mmol/L Final   06/21/2024 139 136 - 145 mmol/L Final   06/20/2024 135 (L) 136 - 145 mmol/L Final      Potassium Potassium   Date Value Ref Range Status   06/23/2024 3.9 3.5 - 5.2 mmol/L Final   06/22/2024 4.2 3.5 - 5.2 mmol/L Final     Comment:     Slight hemolysis detected by analyzer. Result may be falsely elevated.   06/21/2024 4.5 3.5 - 5.2 mmol/L Final   06/20/2024 4.9 3.5 - 5.2 mmol/L Final      Chloride Chloride   Date Value Ref Range Status   06/23/2024 105 98 - 107 mmol/L Final   06/22/2024 104 98 - 107 " "mmol/L Final   06/21/2024 103 98 - 107 mmol/L Final   06/20/2024 96 (L) 98 - 107 mmol/L Final      CO2 CO2   Date Value Ref Range Status   06/23/2024 21.0 (L) 22.0 - 29.0 mmol/L Final   06/22/2024 23.0 22.0 - 29.0 mmol/L Final   06/21/2024 26.0 22.0 - 29.0 mmol/L Final   06/20/2024 25.0 22.0 - 29.0 mmol/L Final      BUN BUN   Date Value Ref Range Status   06/23/2024 82 (H) 8 - 23 mg/dL Final   06/22/2024 84 (H) 8 - 23 mg/dL Final   06/21/2024 83 (H) 8 - 23 mg/dL Final   06/20/2024 85 (H) 8 - 23 mg/dL Final      Creatinine Creatinine   Date Value Ref Range Status   06/23/2024 5.52 (H) 0.57 - 1.00 mg/dL Final   06/22/2024 6.15 (H) 0.57 - 1.00 mg/dL Final   06/21/2024 6.34 (H) 0.57 - 1.00 mg/dL Final   06/20/2024 6.43 (H) 0.57 - 1.00 mg/dL Final      Calcium Calcium   Date Value Ref Range Status   06/23/2024 8.2 (L) 8.6 - 10.5 mg/dL Final   06/22/2024 8.3 (L) 8.6 - 10.5 mg/dL Final   06/21/2024 8.3 (L) 8.6 - 10.5 mg/dL Final   06/20/2024 9.0 8.6 - 10.5 mg/dL Final      PO4 No results found for: \"CAPO4\"   Albumin No results found for: \"ALBUMIN\"     Magnesium Magnesium   Date Value Ref Range Status   06/21/2024 2.0 1.6 - 2.4 mg/dL Final      Uric Acid No results found for: \"URICACID\"     Medication Review: Yes    Assessment & Plan       Renal failure    Acute blood loss anemia    Hyperkalemia    Acute upper GI bleeding    Chronic deep vein thrombosis (DVT)    Primary hypertension    Chronic acquired lymphedema    Chronic cutaneous venous stasis ulcer    Results from last 7 days   Lab Units 06/23/24  0435 06/22/24  1056 06/22/24  0745 06/21/24  2214 06/21/24  0507 06/20/24  1815 06/20/24  1325 06/20/24  0035 06/19/24  1328   SODIUM mmol/L 140 140  --   --  139  --  135* 137 134*   POTASSIUM mmol/L 3.9 4.2  --   --  4.5  --  4.9 5.6* 6.0*   CHLORIDE mmol/L 105 104  --   --  103  --  96* 105 103   CO2 mmol/L 21.0* 23.0  --   --  26.0  --  25.0 19.0* 17.0*   BUN mg/dL 82* 84*  --   --  83*  --  85* 85* 88*   CREATININE mg/dL " 5.52* 6.15*  --   --  6.34*  --  6.43* 6.35* 6.77*   CALCIUM mg/dL 8.2* 8.3*  --   --  8.3*  --  9.0 9.1 9.5   ALBUMIN g/dL  --   --   --   --   --   --   --   --  3.2*   WBC 10*3/mm3 7.28  --  8.09  --  8.27  --   --  10.07 12.02*   HEMOGLOBIN g/dL 7.6*  --  8.5*  8.5* 8.4* 6.6*   < >  --  8.0* 5.8*   PLATELETS 10*3/mm3 176  --  174  --  177  --   --  209 232    < > = values in this interval not displayed.       Intake/Output Summary (Last 24 hours) at 6/23/2024 0955  Last data filed at 6/23/2024 0900  Gross per 24 hour   Intake 240 ml   Output --   Net 240 ml         Assessment & Plan       Acute kidney injury: Last known stable cr ~ 1.4mg/dl. Cr 6.7 mg/dl on admission. UA large alison esterase+ trace protein blood moderate ( Not a clean catch) UACR - 80.9. Non oliguric - DDx - Pre-quinten/ATN vs AIN - improving.      Hyperkalemia: In the setting of WALTER:      Met acidosis: Due to WALTER    Severe anemia: FOBT+. On AC. GI following.      DANIEL: Likely due to severe anemia     Hx of breast cancer: On anastrozole     Supra-therapeutic INR: On dabigatran.  Concern for Gi bleed. FOBT positive.      Recs  - Continue with gentle IV hydration   - Monitor I/O   - Avoid nephrotoxic agents.   - Renal diet   - Adjust meds per renal function   - No emergent need of RRT   - Monitor H/H and transfuse for Hgb less than 7.0       Jose Schwab MD  06/23/24  09:55 EDT              Electronically signed by Jose Schwab MD at 06/23/24 1247       Consult Notes (last 24 hours)  Notes from 06/23/24 0933 through 06/24/24 0933   No notes of this type exist for this encounter.

## 2024-06-24 NOTE — PROGRESS NOTES
[facility] Medicine Services  PROGRESS NOTE    Patient Name: Moon Lerma  : 1955  MRN: [id number]    Date of Admission: 2024  Primary Care Physician: Carmen Singh PA-C    Subjective   Subjective     CC:  Acute renal failure    HPI:  Hemoglobin 6.6 this morning, transfuse 1 unit RBC.  Having black and tarry bowel movements.  No mouth bleeding.  Had a drop or 2 of blood from her nose last night.  No hematuria.  No abdominal pain.  Eating and drinking okay.    Objective   Objective     Vital Signs:   Temp:  [97.2 °F (36.2 °C)-98.3 °F (36.8 °C)] 97.8 °F (36.6 °C)  Heart Rate:  [78-89] 85  Resp:  [16-18] 16  BP: (115-142)/(57-80) 133/80     Physical Exam:  Constitutional: No acute distress, awake, alert, obese, sitting up in bedside chair  HENT: NCAT, mucous membranes moist, left-sided tongue ulceration and small right-sided tongue ulceration  Respiratory: Clear to auscultation bilaterally, respiratory effort normal   Cardiovascular: RRR, no murmurs, rubs, or gallops  Gastrointestinal: Positive bowel sounds, soft, nontender, nondistended  Musculoskeletal: No bilateral ankle edema  Psychiatric: Appropriate affect, cooperative  Neurologic: PERRL, symmetric facies, moves all extremities, speech clear    Results Reviewed:  LAB RESULTS:      Lab 24  0436 24  0436 24  0435 24  0745 24  2214 24  0507 24  1815 24  0035 24  1328   WBC 6.48  --  7.28 8.09  --  8.27  --  10.07 12.02*   HEMOGLOBIN 6.6*  --  7.6* 8.5*  8.5* 8.4* 6.6*   < > 8.0* 5.8*   HEMATOCRIT 20.6*  --  24.0* 26.5*  26.5* 25.3* 20.6*   < > 24.6* 18.9*   PLATELETS 147  --  176 174  --  177  --  209 232   NEUTROS ABS  --   --   --   --   --   --   --  7.17* 9.14*   IMMATURE GRANS (ABS)  --   --   --   --   --   --   --  0.07* 0.12*   LYMPHS ABS  --   --   --   --   --   --   --  2.02 1.81   MONOS ABS  --   --   --   --   --   --   --  0.57 0.70   EOS ABS  --    --   --   --   --   --   --  0.22 0.22   MCV 92.8  --  90.2 88.6  --  91.6  --  91.8 100.0*   PROTIME 35.2* 42.5*  --  47.9*  --  70.9*  --   --  82.1*    < > = values in this interval not displayed.         Lab 06/24/24  0436 06/23/24  0435 06/22/24  1056 06/21/24  0507 06/20/24  1325 06/20/24  0035 06/19/24  2219   SODIUM 139 140 140 139 135*   < >  --    POTASSIUM 4.0 3.9 4.2 4.5 4.9   < >  --    CHLORIDE 105 105 104 103 96*   < >  --    CO2 22.0 21.0* 23.0 26.0 25.0   < >  --    ANION GAP 12.0 14.0 13.0 10.0 14.0   < >  --    BUN 80* 82* 84* 83* 85*   < >  --    CREATININE 5.42* 5.52* 6.15* 6.34* 6.43*   < >  --    EGFR 8.1* 8.0* 7.0* 6.7* 6.6*   < >  --    GLUCOSE 110* 107* 144* 114* 172*   < >  --    CALCIUM 8.2* 8.2* 8.3* 8.3* 9.0   < >  --    MAGNESIUM  --   --   --  2.0  --   --   --    PHOSPHORUS  --   --   --  5.3*  --   --   --    HEMOGLOBIN A1C  --   --   --   --   --   --  5.70*    < > = values in this interval not displayed.         Lab 06/19/24  1328   TOTAL PROTEIN 7.3   ALBUMIN 3.2*   GLOBULIN 4.1   ALT (SGPT) 22   AST (SGOT) 30   BILIRUBIN 0.3   ALK PHOS 59         Lab 06/24/24 0436 06/23/24 0436 06/22/24  0745 06/21/24  0507 06/19/24  1328   PROBNP  --   --   --   --  3,437.0*   HSTROP T  --   --   --   --  26*   PROTIME 35.2* 42.5* 47.9* 70.9* 82.1*   INR 3.47* 4.42* 5.15* 8.51* >10.00*             Lab 06/24/24  0708 06/19/24  1422   ABO TYPING O O   RH TYPING Positive Positive   ANTIBODY SCREEN Negative Negative         Brief Urine Lab Results  (Last result in the past 365 days)        Color   Clarity   Blood   Leuk Est   Nitrite   Protein   CREAT   Urine HCG        06/20/24 1015             23.5                 Microbiology Results Abnormal       Procedure Component Value - Date/Time    Eosinophil Smear - Urine, Urine, Clean Catch [[id number]]  (Normal) Collected: 06/20/24 1014    Lab Status: Final result Specimen: Urine, Clean Catch Updated: 06/20/24 1412     Eosinophil Smear 0 % EOS/100  Cells     Narrative:      No eosinophil seen            No radiology results from the last 24 hrs        Current medications:  Scheduled Meds:First Mouthwash (Magic Mouthwash), 5 mL, Swish & Spit, Q8H  nebivolol, 2.5 mg, Oral, Q24H  pantoprazole, 40 mg, Intravenous, BID AC  sodium chloride, 10 mL, Intravenous, Q12H  terazosin, 2 mg, Oral, Nightly      Continuous Infusions:lactated ringers, 75 mL/hr, Last Rate: 75 mL/hr (06/24/24 0035)      PRN Meds:.•  acetaminophen  •  Biotene dry mouth  •  senna-docusate sodium **AND** polyethylene glycol **AND** bisacodyl **AND** bisacodyl  •  oxymetazoline  •  sodium chloride  •  sodium chloride  •  sodium chloride  •  sodium chloride    Assessment & Plan   Assessment & Plan     Active Hospital Problems    Diagnosis  POA   • **Renal failure [N19]  Yes   • Acute blood loss anemia [D62]  Unknown   • Hyperkalemia [E87.5]  Unknown   • Acute upper GI bleeding [K92.2]  Unknown   • Chronic deep vein thrombosis (DVT) [I82.509]  Unknown   • Primary hypertension [I10]  Unknown   • Chronic acquired lymphedema [I89.0]  Unknown   • Chronic cutaneous venous stasis ulcer [I83.009, L97.909]  Unknown      Resolved Hospital Problems   No resolved problems to display.        Brief Hospital Course to date:  Moon Lerma is a 68 y.o. female on chronic pradaxa for DVT, reported Cr of 1.4 in April, recent prolonged abx for LE edema and weeping; she presents w/ a Cr of 6.77 and K of 6.0, INR is >10, Hgb of 5.8, and melena; suspect high serum levels of pradaxa from renal failure, which precipitated GIB.      *Of note, family is concerned about patient's lack of insurance coverage while in the [place], they would like her stabilized enough to make the trip back to [facility] and complete the rest of her care there, as much as is reasonable     Acute blood loss anemia  Acute upper GIB  Elevated INR greater than 10  --H/H of 5.8/ 18.9 in ED, hemoglobin in April of 9.8 per records   --s/p 5 units RBC  --GI  consulted  -- If melena persists once INR is corrected, then will perform EGD/colonoscopy inpatient per discussion with BERTRAM Rodriges today; family is amenable to scope this admission if the bleeding persists  --holding Pradaxa  --PPI  --Patient does not qualify for Pradaxa reversal based on comorbid conditions, does not have critical bleeding at this time  --s/p vitamin K x4 with dramatic improvement in INR     Acute renal failure  Hyperkalemia, resolved  Metabolic acidosis   --creatinine in April was 1.4  --creatinine in ED of 6.77 likely from decreased oral intake and anemia  --Nephrology consulted  --started on Bicarb drip, transitioned to NS, rate reduced  --off lokelma     Chronic LLE DVT  --holding Pradaxa for above  --my practice partner discussed with Pharmacy, doesn't need DVT ppx at this time due to long half life of Pradaxa and severe renal disease; no SCDs given DVT    Mouth pain due to tongue ulcerations - improving  --Magic mouthwash    Nosebleed-resolved; as needed Afrin, as needed [employer]     HTN  --cont Nebivolol  --cont Prazosin nightly      Chronic venous stasis ulcer  Chronic lymphedema  --PT wound for compression wraps and toe ulcerations     History of breast cancer 2020    Expected Discharge Location and Transportation: home  Expected Discharge   Expected Discharge Date: 6/24/2024; Expected Discharge Time:      VTE Prophylaxis:  Mechanical VTE prophylaxis orders are present.         AM-PAC 6 Clicks Score (PT): 18 (06/23/24 1729)    CODE STATUS:   Code Status and Medical Interventions:   Ordered at: 06/19/24 3496     Level Of Support Discussed With:    Patient     Code Status (Patient has no pulse and is not breathing):    CPR (Attempt to Resuscitate)     Medical Interventions (Patient has pulse or is breathing):    Full Support       Mali Carrillo MD  06/24/24

## 2024-06-24 NOTE — PROGRESS NOTES
" LOS: 5 days   Patient Care Team:  Carmen Singh PA-C as PCP - General (Physician Assistant)    Chief Complaint: Acute kidney injury   Anemia  GI bleed.         Subjective     No acute events overnight. No new complaints   Chart reviewed.  Other physician notes seen.  Discussed with the family in the room.  Review of system:  Episode of epistaxis x 1.  Denies any NSAID use, gross hematuria, hemoptysis, hematemesis, diarrhea, nausea, vomiting.      History taken from: patient    Objective     Vital Sign Min/Max for last 24 hours  Temp  Min: 97.7 °F (36.5 °C)  Max: 99 °F (37.2 °C)   BP  Min: 113/64  Max: 142/57   Pulse  Min: 85  Max: 89   Resp  Min: 16  Max: 18   SpO2  Min: 98 %  Max: 100 %   No data recorded   No data recorded     Flowsheet Rows      Flowsheet Row First Filed Value   Admission Height 152.4 cm (60\") Documented at 06/19/2024 1315   Admission Weight 128 kg (282 lb 3 oz) Documented at 06/19/2024 1315            I/O this shift:  In: 700 [P.O.:200; Blood:500]  Out: -   I/O last 3 completed shifts:  In: 480 [P.O.:480]  Out: -     Vitals:    06/24/24 0823 06/24/24 0930 06/24/24 1212 06/24/24 1230   BP: 113/64 114/92  132/79   BP Location: Left arm      Patient Position: Lying      Pulse: 87      Resp: 18 18 18 18   Temp: 97.7 °F (36.5 °C) 98.2 °F (36.8 °C) 98.1 °F (36.7 °C) 99 °F (37.2 °C)   TempSrc: Oral Axillary Axillary Axillary   SpO2: 98%      Weight:       Height:       Physical examination:  General Appearance: Alert, oriented, no obvious distress.  Patient female morbidly obese  Eyes: PER, EOMI.  Neck: Supple no JVD.  Lungs: Clear auscultation, no rales rhonchi's, equal chest movement, nonlabored.  Heart: No gallop, murmur, rub, RRR.  Abdomen: Soft, nontender, positive bowel sounds, morbid obesity  Extremities: Positive trace edema bilateral lower extremity edema, no cyanosis.  Neuro: No focal deficit, moving all extremities, alert oriented X 3  Skin stasis dermatitis lower extremity " "noted.      Results Review:     I reviewed the patient's new clinical results.    WBC WBC   Date Value Ref Range Status   06/24/2024 6.48 3.40 - 10.80 10*3/mm3 Final   06/23/2024 7.28 3.40 - 10.80 10*3/mm3 Final   06/22/2024 8.09 3.40 - 10.80 10*3/mm3 Final      HGB Hemoglobin   Date Value Ref Range Status   06/24/2024 6.6 (C) 12.0 - 15.9 g/dL Final   06/23/2024 7.6 (L) 12.0 - 15.9 g/dL Final   06/22/2024 8.5 (L) 12.0 - 15.9 g/dL Final   06/22/2024 8.5 (L) 12.0 - 15.9 g/dL Final   06/21/2024 8.4 (L) 12.0 - 15.9 g/dL Final      HCT Hematocrit   Date Value Ref Range Status   06/24/2024 20.6 (C) 34.0 - 46.6 % Final   06/23/2024 24.0 (L) 34.0 - 46.6 % Final   06/22/2024 26.5 (L) 34.0 - 46.6 % Final   06/22/2024 26.5 (L) 34.0 - 46.6 % Final   06/21/2024 25.3 (L) 34.0 - 46.6 % Final      Platlets No results found for: \"LABPLAT\"   MCV MCV   Date Value Ref Range Status   06/24/2024 92.8 79.0 - 97.0 fL Final   06/23/2024 90.2 79.0 - 97.0 fL Final   06/22/2024 88.6 79.0 - 97.0 fL Final          Sodium Sodium   Date Value Ref Range Status   06/24/2024 139 136 - 145 mmol/L Final   06/23/2024 140 136 - 145 mmol/L Final   06/22/2024 140 136 - 145 mmol/L Final      Potassium Potassium   Date Value Ref Range Status   06/24/2024 4.0 3.5 - 5.2 mmol/L Final   06/23/2024 3.9 3.5 - 5.2 mmol/L Final   06/22/2024 4.2 3.5 - 5.2 mmol/L Final     Comment:     Slight hemolysis detected by analyzer. Result may be falsely elevated.      Chloride Chloride   Date Value Ref Range Status   06/24/2024 105 98 - 107 mmol/L Final   06/23/2024 105 98 - 107 mmol/L Final   06/22/2024 104 98 - 107 mmol/L Final      CO2 CO2   Date Value Ref Range Status   06/24/2024 22.0 22.0 - 29.0 mmol/L Final   06/23/2024 21.0 (L) 22.0 - 29.0 mmol/L Final   06/22/2024 23.0 22.0 - 29.0 mmol/L Final      BUN BUN   Date Value Ref Range Status   06/24/2024 80 (H) 8 - 23 mg/dL Final   06/23/2024 82 (H) 8 - 23 mg/dL Final   06/22/2024 84 (H) 8 - 23 mg/dL Final      Creatinine " "Creatinine   Date Value Ref Range Status   06/24/2024 5.42 (H) 0.57 - 1.00 mg/dL Final   06/23/2024 5.52 (H) 0.57 - 1.00 mg/dL Final   06/22/2024 6.15 (H) 0.57 - 1.00 mg/dL Final      Calcium Calcium   Date Value Ref Range Status   06/24/2024 8.2 (L) 8.6 - 10.5 mg/dL Final   06/23/2024 8.2 (L) 8.6 - 10.5 mg/dL Final   06/22/2024 8.3 (L) 8.6 - 10.5 mg/dL Final      PO4 No results found for: \"CAPO4\"   Albumin No results found for: \"ALBUMIN\"     Magnesium No results found for: \"MG\"     Uric Acid No results found for: \"URICACID\"     Medication Review: Yes    Assessment & Plan       Renal failure    Acute blood loss anemia    Hyperkalemia    Acute upper GI bleeding    Chronic deep vein thrombosis (DVT)    Primary hypertension    Chronic acquired lymphedema    Chronic cutaneous venous stasis ulcer    Results from last 7 days   Lab Units 06/24/24  0436 06/23/24  0435 06/22/24  1056 06/22/24  0745 06/21/24  2214 06/21/24  0507 06/20/24  0035 06/19/24  1328   SODIUM mmol/L 139 140 140  --   --  139   < > 134*   POTASSIUM mmol/L 4.0 3.9 4.2  --   --  4.5   < > 6.0*   CHLORIDE mmol/L 105 105 104  --   --  103   < > 103   CO2 mmol/L 22.0 21.0* 23.0  --   --  26.0   < > 17.0*   BUN mg/dL 80* 82* 84*  --   --  83*   < > 88*   CREATININE mg/dL 5.42* 5.52* 6.15*  --   --  6.34*   < > 6.77*   CALCIUM mg/dL 8.2* 8.2* 8.3*  --   --  8.3*   < > 9.5   ALBUMIN g/dL  --   --   --   --   --   --   --  3.2*   WBC 10*3/mm3 6.48 7.28  --  8.09  --  8.27   < > 12.02*   HEMOGLOBIN g/dL 6.6* 7.6*  --  8.5*  8.5* 8.4* 6.6*   < > 5.8*   PLATELETS 10*3/mm3 147 176  --  174  --  177   < > 232    < > = values in this interval not displayed.       Intake/Output Summary (Last 24 hours) at 6/24/2024 1554  Last data filed at 6/24/2024 1230  Gross per 24 hour   Intake 700 ml   Output --   Net 700 ml         Assessment and plan:       1.  Acute kidney injury: Last known stable cr ~ 1.4mg/dl. Cr 6.7 mg/dl on admission. UA large alison esterase+ trace protein " blood moderate ( Not a clean catch) UACR - 80.9. Non oliguric - DDx - Pre-quinten/ATN vs AIN - improving.  C3 complement 163, C4 complement 28     2.  Hyperkalemia: In the setting of WALTER:      3.  Met acidosis: Due to WALTER    4.  Severe anemia: FOBT+. On AC. GI following.      5.  DANIEL: Likely due to severe anemia     6.  Hx of breast cancer: On anastrozole     7.  Supra-therapeutic INR: On dabigatran.  Concern for Gi bleed. FOBT positive.      Recommendation:  -Slow renal improvement.  Continue with gentle IV hydration   - Monitor I/O   - Avoid nephrotoxic agents.   - Renal diet   - Adjust meds per renal function   - No emergent need of RRT   - Monitor H/H and transfuse for Hgb less than 7.0 okay to give transfusions  Discussed with the family regards to dialysis, kidney biopsy.  Will check some serologies, patient stated that she has epistaxis few days back    Adolfo Reynoso MD  06/24/24  15:54 EDT

## 2024-06-24 NOTE — CASE MANAGEMENT/SOCIAL WORK
Continued Stay Note  [facility]     Patient Name: Moon Lerma  MRN: [id number]  Today's Date: 6/24/2024    Admit Date: 6/19/2024    Plan: home   Discharge Plan       Row Name 06/24/24 1137       Plan    Plan Comments Per MDR, patient is not medically ready for discharge today. Patient is recieving blood. Patient's plan is home with daughter. CM will continue to follow.    Final Discharge Disposition Code 01 - home or self-care                   Discharge Codes    No documentation.                 Expected Discharge Date and Time       Expected Discharge Date Expected Discharge Time    Jun 24, 2024               Afia Roy RN

## 2024-06-24 NOTE — PLAN OF CARE
Goal Outcome Evaluation:  Plan of Care Reviewed With: patient, daughter      A &O x4. VSS. Non tele. O2 sat >95% on RA. Denies CP/SOB.  Denies N/V/D. Afebrile. 1PA to BR with walker. Large black tarry BM x1 this shift. Tolerated well with 1 unit RBCs. Monitor s/s of active bleeding. Family at bedside. No other concerns assessed or stated.

## 2024-06-25 LAB
ANION GAP SERPL CALCULATED.3IONS-SCNC: 15 MMOL/L (ref 5–15)
BH BB BLOOD EXPIRATION DATE: NORMAL
BH BB BLOOD TYPE BARCODE: 5100
BH BB DISPENSE STATUS: NORMAL
BH BB PRODUCT CODE: NORMAL
BH BB UNIT NUMBER: NORMAL
BUN SERPL-MCNC: 71 MG/DL (ref 8–23)
BUN/CREAT SERPL: 13.5 (ref 7–25)
CALCIUM SPEC-SCNC: 8.4 MG/DL (ref 8.6–10.5)
CHLORIDE SERPL-SCNC: 104 MMOL/L (ref 98–107)
CO2 SERPL-SCNC: 20 MMOL/L (ref 22–29)
CREAT SERPL-MCNC: 5.24 MG/DL (ref 0.57–1)
CROSSMATCH INTERPRETATION: NORMAL
DEPRECATED RDW RBC AUTO: 51.5 FL (ref 37–54)
EGFRCR SERPLBLD CKD-EPI 2021: 8.5 ML/MIN/1.73
ERYTHROCYTE [DISTWIDTH] IN BLOOD BY AUTOMATED COUNT: 15.7 % (ref 12.3–15.4)
GLUCOSE SERPL-MCNC: 98 MG/DL (ref 65–99)
HCT VFR BLD AUTO: 23.6 % (ref 34–46.6)
HGB BLD-MCNC: 7.7 G/DL (ref 12–15.9)
INR PPP: 2.22 (ref 0.89–1.12)
MAGNESIUM SERPL-MCNC: 1.8 MG/DL (ref 1.6–2.4)
MCH RBC QN AUTO: 29.5 PG (ref 26.6–33)
MCHC RBC AUTO-ENTMCNC: 32.6 G/DL (ref 31.5–35.7)
MCV RBC AUTO: 90.4 FL (ref 79–97)
PLATELET # BLD AUTO: 132 10*3/MM3 (ref 140–450)
PMV BLD AUTO: 11.1 FL (ref 6–12)
POTASSIUM SERPL-SCNC: 4 MMOL/L (ref 3.5–5.2)
PROTHROMBIN TIME: 24.8 SECONDS (ref 12.2–14.5)
RBC # BLD AUTO: 2.61 10*6/MM3 (ref 3.77–5.28)
SODIUM SERPL-SCNC: 139 MMOL/L (ref 136–145)
UNIT  ABO: NORMAL
UNIT  RH: NORMAL
WBC NRBC COR # BLD AUTO: 5.78 10*3/MM3 (ref 3.4–10.8)

## 2024-06-25 PROCEDURE — 83516 IMMUNOASSAY NONANTIBODY: CPT | Performed by: INTERNAL MEDICINE

## 2024-06-25 PROCEDURE — P9047 ALBUMIN (HUMAN), 25%, 50ML: HCPCS | Performed by: INTERNAL MEDICINE

## 2024-06-25 PROCEDURE — 86037 ANCA TITER EACH ANTIBODY: CPT | Performed by: INTERNAL MEDICINE

## 2024-06-25 PROCEDURE — 97110 THERAPEUTIC EXERCISES: CPT

## 2024-06-25 PROCEDURE — 83735 ASSAY OF MAGNESIUM: CPT | Performed by: STUDENT IN AN ORGANIZED HEALTH CARE EDUCATION/TRAINING PROGRAM

## 2024-06-25 PROCEDURE — 25010000002 ALBUMIN HUMAN 25% PER 50 ML: Performed by: INTERNAL MEDICINE

## 2024-06-25 PROCEDURE — 85027 COMPLETE CBC AUTOMATED: CPT | Performed by: STUDENT IN AN ORGANIZED HEALTH CARE EDUCATION/TRAINING PROGRAM

## 2024-06-25 PROCEDURE — 97116 GAIT TRAINING THERAPY: CPT

## 2024-06-25 PROCEDURE — 97535 SELF CARE MNGMENT TRAINING: CPT

## 2024-06-25 PROCEDURE — 25810000003 LACTATED RINGERS PER 1000 ML: Performed by: INTERNAL MEDICINE

## 2024-06-25 PROCEDURE — 85610 PROTHROMBIN TIME: CPT | Performed by: INTERNAL MEDICINE

## 2024-06-25 PROCEDURE — 99232 SBSQ HOSP IP/OBS MODERATE 35: CPT | Performed by: INTERNAL MEDICINE

## 2024-06-25 PROCEDURE — 80048 BASIC METABOLIC PNL TOTAL CA: CPT | Performed by: STUDENT IN AN ORGANIZED HEALTH CARE EDUCATION/TRAINING PROGRAM

## 2024-06-25 PROCEDURE — 86225 DNA ANTIBODY NATIVE: CPT | Performed by: INTERNAL MEDICINE

## 2024-06-25 RX ORDER — ALBUMIN (HUMAN) 12.5 G/50ML
25 SOLUTION INTRAVENOUS ONCE
Status: COMPLETED | OUTPATIENT
Start: 2024-06-25 | End: 2024-06-25

## 2024-06-25 RX ADMIN — NEBIVOLOL 2.5 MG: 2.5 TABLET ORAL at 09:11

## 2024-06-25 RX ADMIN — TERAZOSIN HYDROCHLORIDE 2 MG: 2 CAPSULE ORAL at 20:33

## 2024-06-25 RX ADMIN — SODIUM CHLORIDE, POTASSIUM CHLORIDE, SODIUM LACTATE AND CALCIUM CHLORIDE 75 ML/HR: 600; 310; 30; 20 INJECTION, SOLUTION INTRAVENOUS at 05:30

## 2024-06-25 RX ADMIN — DIPHENHYDRAMINE HYDROCHLORIDE AND LIDOCAINE HYDROCHLORIDE AND ALUMINUM HYDROXIDE AND MAGNESIUM HYDRO 5 ML: KIT at 05:30

## 2024-06-25 RX ADMIN — PANTOPRAZOLE SODIUM 40 MG: 40 INJECTION, POWDER, FOR SOLUTION INTRAVENOUS at 17:16

## 2024-06-25 RX ADMIN — SODIUM CHLORIDE, POTASSIUM CHLORIDE, SODIUM LACTATE AND CALCIUM CHLORIDE 75 ML/HR: 600; 310; 30; 20 INJECTION, SOLUTION INTRAVENOUS at 20:33

## 2024-06-25 RX ADMIN — DIPHENHYDRAMINE HYDROCHLORIDE AND LIDOCAINE HYDROCHLORIDE AND ALUMINUM HYDROXIDE AND MAGNESIUM HYDRO 5 ML: KIT at 13:16

## 2024-06-25 RX ADMIN — DIPHENHYDRAMINE HYDROCHLORIDE AND LIDOCAINE HYDROCHLORIDE AND ALUMINUM HYDROXIDE AND MAGNESIUM HYDRO 5 ML: KIT at 20:33

## 2024-06-25 RX ADMIN — ALBUMIN (HUMAN) 25 G: 0.25 INJECTION, SOLUTION INTRAVENOUS at 13:16

## 2024-06-25 RX ADMIN — PANTOPRAZOLE SODIUM 40 MG: 40 INJECTION, POWDER, FOR SOLUTION INTRAVENOUS at 09:10

## 2024-06-25 RX ADMIN — Medication 10 ML: at 09:11

## 2024-06-25 NOTE — PROGRESS NOTES
" LOS: 6 days   Patient Care Team:  Carmen Singh PA-C as PCP - General (Physician Assistant)    Chief Complaint: Acute kidney injury   Anemia  GI bleed.    No CP or SOA , fever, chills, rigors, rash, N/V, Constipation.       Subjective     No acute events overnight. No new complaints         History taken from: patient    Objective     Vital Sign Min/Max for last 24 hours  Temp  Min: 96.8 °F (36 °C)  Max: 99 °F (37.2 °C)   BP  Min: 120/78  Max: 147/79   Pulse  Min: 73  Max: 80   Resp  Min: 17  Max: 18   SpO2  Min: 97 %  Max: 99 %   No data recorded   No data recorded     Flowsheet Rows      Flowsheet Row First Filed Value   Admission Height 152.4 cm (60\") Documented at 06/19/2024 1315   Admission Weight 128 kg (282 lb 3 oz) Documented at 06/19/2024 1315            No intake/output data recorded.  I/O last 3 completed shifts:  In: 700 [P.O.:200; Blood:500]  Out: -     Vitals:    06/24/24 1230 06/24/24 2121 06/25/24 0429 06/25/24 0908   BP: 132/79 147/79 131/71 120/78   BP Location:  Left arm Left arm Left arm   Patient Position:  Sitting Lying Lying   Pulse:  73 80 79   Resp: 18 18 17 18   Temp: 99 °F (37.2 °C) 98.3 °F (36.8 °C) 98.6 °F (37 °C) 96.8 °F (36 °C)   TempSrc: Axillary Oral Oral Oral   SpO2:  97% 98% 99%   Weight:       Height:             Gen: Alert, NAD   HENT: NC, AT, EOMI   NECK: Supple, no JVD, Trachea midline   LUNGS: CTA bilaterally, non labored respirtation   CVS: S1/S2 audible, RRR, no murmur   Abd: Soft, NT, ND, BS+   Ext: No pedal edema, no cyanosis   CNS: Alert, No focal deficit noted grossly  Psy: Cooperative  Skin: Warm, dry and intact        Results Review:     I reviewed the patient's new clinical results.    WBC WBC   Date Value Ref Range Status   06/25/2024 5.78 3.40 - 10.80 10*3/mm3 Final   06/24/2024 6.48 3.40 - 10.80 10*3/mm3 Final   06/23/2024 7.28 3.40 - 10.80 10*3/mm3 Final      HGB Hemoglobin   Date Value Ref Range Status   06/25/2024 7.7 (L) 12.0 - 15.9 g/dL Final " "  06/24/2024 8.0 (L) 12.0 - 15.9 g/dL Final   06/24/2024 6.6 (C) 12.0 - 15.9 g/dL Final   06/23/2024 7.6 (L) 12.0 - 15.9 g/dL Final      HCT Hematocrit   Date Value Ref Range Status   06/25/2024 23.6 (L) 34.0 - 46.6 % Final   06/24/2024 25.0 (L) 34.0 - 46.6 % Final   06/24/2024 20.6 (C) 34.0 - 46.6 % Final   06/23/2024 24.0 (L) 34.0 - 46.6 % Final      Platlets No results found for: \"LABPLAT\"   MCV MCV   Date Value Ref Range Status   06/25/2024 90.4 79.0 - 97.0 fL Final   06/24/2024 92.8 79.0 - 97.0 fL Final   06/23/2024 90.2 79.0 - 97.0 fL Final          Sodium Sodium   Date Value Ref Range Status   06/25/2024 139 136 - 145 mmol/L Final   06/24/2024 139 136 - 145 mmol/L Final   06/23/2024 140 136 - 145 mmol/L Final   06/22/2024 140 136 - 145 mmol/L Final      Potassium Potassium   Date Value Ref Range Status   06/25/2024 4.0 3.5 - 5.2 mmol/L Final   06/24/2024 4.0 3.5 - 5.2 mmol/L Final   06/23/2024 3.9 3.5 - 5.2 mmol/L Final   06/22/2024 4.2 3.5 - 5.2 mmol/L Final     Comment:     Slight hemolysis detected by analyzer. Result may be falsely elevated.      Chloride Chloride   Date Value Ref Range Status   06/25/2024 104 98 - 107 mmol/L Final   06/24/2024 105 98 - 107 mmol/L Final   06/23/2024 105 98 - 107 mmol/L Final   06/22/2024 104 98 - 107 mmol/L Final      CO2 CO2   Date Value Ref Range Status   06/25/2024 20.0 (L) 22.0 - 29.0 mmol/L Final   06/24/2024 22.0 22.0 - 29.0 mmol/L Final   06/23/2024 21.0 (L) 22.0 - 29.0 mmol/L Final   06/22/2024 23.0 22.0 - 29.0 mmol/L Final      BUN BUN   Date Value Ref Range Status   06/25/2024 71 (H) 8 - 23 mg/dL Final   06/24/2024 80 (H) 8 - 23 mg/dL Final   06/23/2024 82 (H) 8 - 23 mg/dL Final   06/22/2024 84 (H) 8 - 23 mg/dL Final      Creatinine Creatinine   Date Value Ref Range Status   06/25/2024 5.24 (H) 0.57 - 1.00 mg/dL Final   06/24/2024 5.42 (H) 0.57 - 1.00 mg/dL Final   06/23/2024 5.52 (H) 0.57 - 1.00 mg/dL Final   06/22/2024 6.15 (H) 0.57 - 1.00 mg/dL Final    " "  Calcium Calcium   Date Value Ref Range Status   06/25/2024 8.4 (L) 8.6 - 10.5 mg/dL Final   06/24/2024 8.2 (L) 8.6 - 10.5 mg/dL Final   06/23/2024 8.2 (L) 8.6 - 10.5 mg/dL Final   06/22/2024 8.3 (L) 8.6 - 10.5 mg/dL Final      PO4 No results found for: \"CAPO4\"   Albumin No results found for: \"ALBUMIN\"     Magnesium Magnesium   Date Value Ref Range Status   06/25/2024 1.8 1.6 - 2.4 mg/dL Final      Uric Acid No results found for: \"URICACID\"     Medication Review: Yes    Assessment & Plan       Renal failure    Acute blood loss anemia    Hyperkalemia    Acute upper GI bleeding    Chronic deep vein thrombosis (DVT)    Primary hypertension    Chronic acquired lymphedema    Chronic cutaneous venous stasis ulcer    Results from last 7 days   Lab Units 06/25/24  0431 06/24/24  1829 06/24/24  0436 06/23/24  0435 06/22/24  1056 06/22/24  0745 06/20/24  0035 06/19/24  1328   SODIUM mmol/L 139  --  139 140 140  --    < > 134*   POTASSIUM mmol/L 4.0  --  4.0 3.9 4.2  --    < > 6.0*   CHLORIDE mmol/L 104  --  105 105 104  --    < > 103   CO2 mmol/L 20.0*  --  22.0 21.0* 23.0  --    < > 17.0*   BUN mg/dL 71*  --  80* 82* 84*  --    < > 88*   CREATININE mg/dL 5.24*  --  5.42* 5.52* 6.15*  --    < > 6.77*   CALCIUM mg/dL 8.4*  --  8.2* 8.2* 8.3*  --    < > 9.5   ALBUMIN g/dL  --   --   --   --   --   --   --  3.2*   WBC 10*3/mm3 5.78  --  6.48 7.28  --  8.09   < > 12.02*   HEMOGLOBIN g/dL 7.7* 8.0* 6.6* 7.6*  --  8.5*  8.5*   < > 5.8*   PLATELETS 10*3/mm3 132*  --  147 176  --  174   < > 232    < > = values in this interval not displayed.       Intake/Output Summary (Last 24 hours) at 6/25/2024 1042  Last data filed at 6/24/2024 1230  Gross per 24 hour   Intake 500 ml   Output --   Net 500 ml         Assessment & Plan       Acute kidney injury: Last known stable cr ~ 1.4mg/dl. Cr 6.7 mg/dl on admission. UA large alison esterase+ trace protein blood moderate ( Not a clean catch) UACR - 80.9. Non oliguric - DDx - Pre-quinten/ATN vs AIN " - improving.      Hyperkalemia: In the setting of WALTER:      Met acidosis: Due to WALTER    Severe anemia: FOBT+. On AC. GI following.      DANIEL: Likely due to severe anemia     Hx of breast cancer: On anastrozole     Supra-therapeutic INR: On dabigatran.  Concern for Gi bleed. FOBT positive.      Recs  - Continue with gentle IV hydration   - Monitor I/O   - Avoid nephrotoxic agents.   - Renal diet   - Adjust meds per renal function   - No emergent need of RRT   - Monitor H/H and transfuse for Hgb less than 7.0   - ANCA panel pending. If no improvement, may need renal biopsy       Jose Schwab MD  06/25/24  10:42 EDT

## 2024-06-25 NOTE — PLAN OF CARE
Goal Outcome Evaluation:  Plan of Care Reviewed With: patient, family        Progress: improving  Outcome Evaluation: Pt and family offered  cart, however declined due to patient preferring family to translate when needed. Pt able to progress gait distance this date, amb 120ft in hallway with RW and CGA. Pt dem wide YOHAN with bilat sway side to side. Pt needed frequent standing rest breaks due to SOA and fatigue. Pt remains below baseline level of function for mobility and IPPT services continue to be warranted at this time.

## 2024-06-25 NOTE — PLAN OF CARE
Goal Outcome Evaluation:  Plan of Care Reviewed With: patient, family        Progress: improving  Outcome Evaluation: OT promoted act beenezer and adl retraining. Pt min assist for ubd, dep for donning socks, sba for sts, cga for mob at rw level with standing rest breaks. Pt completed ub te with discussion of act routine at home. Recert complete and recommend continued IPOT and HHOT at d/c.      Anticipated Discharge Disposition (OT): home with assist, home with home health

## 2024-06-25 NOTE — PROGRESS NOTES
[facility] Medicine Services  PROGRESS NOTE    Patient Name: Moon Lerma  : 1955  MRN: [id number]    Date of Admission: 2024  Primary Care Physician: Carmen Singh PA-C    Subjective   Subjective     CC:  Acute renal failure    HPI:  Family says patient is still having melena    Objective   Objective     Vital Signs:   Temp:  [96.8 °F (36 °C)-98.6 °F (37 °C)] 96.8 °F (36 °C)  Heart Rate:  [73-83] 83  Resp:  [17-18] 18  BP: (120-147)/(71-80) 145/80     Physical Exam:  NAD, in bed  MM moist  RRR  Breath sounds grossly clear bilaterally  Abd soft, NT  Obese  + LE edema  Flat affect, awake, speech clear    Family at bedside    Results Reviewed:  LAB RESULTS:      Lab 24  1254 24  0431 24  1829 24  0436 24  0436 24  0435 24  0745 24  2214 24  0507 24  1815 24  0035 24  1328   WBC  --  5.78  --  6.48  --  7.28 8.09  --  8.27  --  10.07 12.02*   HEMOGLOBIN  --  7.7* 8.0* 6.6*  --  7.6* 8.5*  8.5*   < > 6.6*   < > 8.0* 5.8*   HEMATOCRIT  --  23.6* 25.0* 20.6*  --  24.0* 26.5*  26.5*   < > 20.6*   < > 24.6* 18.9*   PLATELETS  --  132*  --  147  --  176 174  --  177  --  209 232   NEUTROS ABS  --   --   --   --   --   --   --   --   --   --  7.17* 9.14*   IMMATURE GRANS (ABS)  --   --   --   --   --   --   --   --   --   --  0.07* 0.12*   LYMPHS ABS  --   --   --   --   --   --   --   --   --   --  2.02 1.81   MONOS ABS  --   --   --   --   --   --   --   --   --   --  0.57 0.70   EOS ABS  --   --   --   --   --   --   --   --   --   --  0.22 0.22   MCV  --  90.4  --  92.8  --  90.2 88.6  --  91.6  --  91.8 100.0*   PROTIME 24.8*  --   --  35.2* 42.5*  --  47.9*  --  70.9*  --   --  82.1*    < > = values in this interval not displayed.         Lab 24  0431 24  0436 24  0435 24  1056 24  0507 24  0035 24  0959   SODIUM 139 139 140 140 139   < >  --    POTASSIUM  4.0 4.0 3.9 4.2 4.5   < >  --    CHLORIDE 104 105 105 104 103   < >  --    CO2 20.0* 22.0 21.0* 23.0 26.0   < >  --    ANION GAP 15.0 12.0 14.0 13.0 10.0   < >  --    BUN 71* 80* 82* 84* 83*   < >  --    CREATININE 5.24* 5.42* 5.52* 6.15* 6.34*   < >  --    EGFR 8.5* 8.1* 8.0* 7.0* 6.7*   < >  --    GLUCOSE 98 110* 107* 144* 114*   < >  --    CALCIUM 8.4* 8.2* 8.2* 8.3* 8.3*   < >  --    MAGNESIUM 1.8  --   --   --  2.0  --   --    PHOSPHORUS  --   --   --   --  5.3*  --   --    HEMOGLOBIN A1C  --   --   --   --   --   --  5.70*    < > = values in this interval not displayed.         Lab 06/19/24  1328   TOTAL PROTEIN 7.3   ALBUMIN 3.2*   GLOBULIN 4.1   ALT (SGPT) 22   AST (SGOT) 30   BILIRUBIN 0.3   ALK PHOS 59         Lab 06/25/24  1254 06/24/24  0436 06/23/24  0436 06/22/24  0745 06/21/24  0507 06/19/24  1328   PROBNP  --   --   --   --   --  3,437.0*   HSTROP T  --   --   --   --   --  26*   PROTIME 24.8* 35.2* 42.5* 47.9* 70.9* 82.1*   INR 2.22* 3.47* 4.42* 5.15* 8.51* >10.00*             Lab 06/24/24  0708 06/19/24  1422   ABO TYPING O O   RH TYPING Positive Positive   ANTIBODY SCREEN Negative Negative         Brief Urine Lab Results  (Last result in the past 365 days)        Color   Clarity   Blood   Leuk Est   Nitrite   Protein   CREAT   Urine HCG        06/20/24 1015             23.5                 Microbiology Results Abnormal       Procedure Component Value - Date/Time    Eosinophil Smear - Urine, Urine, Clean Catch [[id number]]  (Normal) Collected: 06/20/24 1014    Lab Status: Final result Specimen: Urine, Clean Catch Updated: 06/20/24 1412     Eosinophil Smear 0 % EOS/100 Cells     Narrative:      No eosinophil seen            No radiology results from the last 24 hrs        Current medications:  Scheduled Meds:First Mouthwash (Magic Mouthwash), 5 mL, Swish & Spit, Q8H  nebivolol, 2.5 mg, Oral, Q24H  pantoprazole, 40 mg, Intravenous, BID AC  sodium chloride, 10 mL, Intravenous, Q12H  terazosin, 2 mg,  Oral, Nightly      Continuous Infusions:lactated ringers, 75 mL/hr, Last Rate: 75 mL/hr (06/25/24 0530)      PRN Meds:.  acetaminophen    Biotene dry mouth    senna-docusate sodium **AND** polyethylene glycol **AND** bisacodyl **AND** bisacodyl    oxymetazoline    sodium chloride    sodium chloride    sodium chloride    sodium chloride    Assessment & Plan   Assessment & Plan     Active Hospital Problems    Diagnosis  POA    **Renal failure [N19]  Yes    Acute blood loss anemia [D62]  Unknown    Hyperkalemia [E87.5]  Unknown    Acute upper GI bleeding [K92.2]  Unknown    Chronic deep vein thrombosis (DVT) [I82.509]  Unknown    Primary hypertension [I10]  Unknown    Chronic acquired lymphedema [I89.0]  Unknown    Chronic cutaneous venous stasis ulcer [I83.009, L97.909]  Unknown      Resolved Hospital Problems   No resolved problems to display.        Brief Hospital Course to date:  Moon Lerma is a 68 y.o. female on chronic pradaxa for DVT, reported Cr of 1.4 in April, recent prolonged abx for LE edema and weeping; she presents w/ a Cr of 6.77 and K of 6.0, INR is >10, Hgb of 5.8, and melena; suspect high serum levels of pradaxa from renal failure, which precipitated GIB.      *Of note, family is concerned about patient's lack of insurance coverage while in the [place], they would like her stabilized enough to make the trip back to [facility] and complete the rest of her care there, as much as is reasonable     Acute blood loss anemia  Acute upper GIB  Elevated INR greater than 10  --hemoglobin 7.7  --s/p 5 units RBC  --discussed with GI today  --holding Pradaxa  --PPI  --s/p vitamin K x4      Acute renal failure  Hyperkalemia, resolved  Metabolic acidosis   --creatinine in April was 1.4  --creatinine of 6.77 on admission  --Nephrology follows  --ANCA pending, may need renal biopsy     Chronic LLE DVT  H/o PE  --holding Pradaxa for above  --my practice partner discussed with Pharmacy, doesn't need DVT ppx at this time due  to long half life of Pradaxa and severe renal disease; no SCDs given DVT  --will need to assess DVT treatment again however prior to discharge    Mouth pain due to tongue ulcerations - improving  --Magic mouthwash  - oragel     Nosebleed-resolved; as needed Afrin, as needed [employer]     HTN  --cont Nebivolol  --cont Prazosin nightly      Chronic venous stasis ulcer  Chronic lymphedema  --PT wound for compression wraps and toe ulcerations     History of breast cancer 2020    Expected Discharge Location and Transportation: home  Expected Discharge   Expected Discharge Date: 6/24/2024; Expected Discharge Time:      VTE Prophylaxis:  Mechanical VTE prophylaxis orders are present.         AM-PAC 6 Clicks Score (PT): 18 (06/25/24 1123)    CODE STATUS:   Code Status and Medical Interventions:   Ordered at: 06/19/24 7239     Level Of Support Discussed With:    Patient     Code Status (Patient has no pulse and is not breathing):    CPR (Attempt to Resuscitate)     Medical Interventions (Patient has pulse or is breathing):    Full Support       Viktor Morales MD  06/25/24

## 2024-06-25 NOTE — THERAPY TREATMENT NOTE
Patient Name: Moon Lerma  : 1955    MRN: [id number]                              Today's Date: 2024       Admit Date: 2024    Visit Dx:     ICD-10-CM ICD-9-CM   1. Acute blood loss anemia  D62 285.1   2. Acute renal failure, unspecified acute renal failure type  N17.9 584.9   3. Acute hyperkalemia  E87.5 276.7   4. Acute GI bleeding  K92.2 578.9     Patient Active Problem List   Diagnosis    Renal failure    Acute blood loss anemia    Hyperkalemia    Acute upper GI bleeding    Chronic deep vein thrombosis (DVT)    Primary hypertension    Chronic acquired lymphedema    Chronic cutaneous venous stasis ulcer     Past Medical History:   Diagnosis Date    Breast cancer     Clotting disorder     DVT (deep venous thrombosis)     Hypertension      Past Surgical History:   Procedure Laterality Date    MASTECTOMY, PARTIAL      left breast      General Information       Row Name 24 1037          Physical Therapy Time and Intention    Document Type therapy note (daily note)  -SD     Mode of Treatment physical therapy  -SD       Row Name 24 1037          General Information    Existing Precautions/Restrictions fall;other (see comments)  patient and family refused interpretor cart, requested to interpret for patient.  -SD       Row Name 24 1037          Cognition    Orientation Status (Cognition) oriented x 3  -SD       Row Name 24 1037          Safety Issues, Functional Mobility    Safety Issues Affecting Function (Mobility) insight into deficits/self-awareness;positioning of assistive device;sequencing abilities  -SD     Impairments Affecting Function (Mobility) balance;endurance/activity tolerance;pain;strength;shortness of breath  -SD               User Key  (r) = Recorded By, (t) = Taken By, (c) = Cosigned By      Initials Name Provider Type    SD Marbella Espinoza PT Physical Therapist                   Mobility       Row Name 24 1118          Bed Mobility    Comment,  (Bed Mobility) UIC  -SD       Row Name 06/25/24 1118          Transfers    Comment, (Transfers) pt stood impulsively, however did not need physical assistance to perform  -SD       Row Name 06/25/24 1118          Sit-Stand Transfer    Sit-Stand [place] (Transfers) standby assist  -SD     Assistive Device (Sit-Stand Transfers) walker, front-wheeled  -SD       Row Name 06/25/24 1118          Gait/Stairs (Locomotion)    [place] Level (Gait) verbal cues;contact guard;1 person assist;1 person to manage equipment  -SD     Assistive Device (Gait) walker, front-wheeled  -SD     Distance in Feet (Gait) 120  -SD     Deviations/Abnormal Patterns (Gait) base of support, wide;shelly decreased;gait speed decreased;bilateral deviations  -SD     Bilateral Gait Deviations heel strike decreased  -SD     Comment, (Gait/Stairs) Pt able to progress gait distance this date, amb 120ft in hallway with RW and CGA. Pt dem wide YOHAN with bilat sway side to side. Pt needed frequent standing rest breaks due to SOA and fatigue.  -SD               User Key  (r) = Recorded By, (t) = Taken By, (c) = Cosigned By      Initials Name Provider Type    Marbella Sinha PT Physical Therapist                   Obj/Interventions       Row Name 06/25/24 1121          Balance    Static Sitting Balance supervision  -SD     Position, Sitting Balance unsupported;sitting in chair  -SD     Static Standing Balance standby assist  -SD     Position/Device Used, Standing Balance walker, front-wheeled  -SD               User Key  (r) = Recorded By, (t) = Taken By, (c) = Cosigned By      Initials Name Provider Type    Marbella Sinha PT Physical Therapist                   Goals/Plan    No documentation.                  Clinical Impression       Row Name 06/25/24 1121          Pain    Pretreatment Pain Rating 0/10 - no pain  -SD     Posttreatment Pain Rating 0/10 - no pain  -SD       Row Name 06/25/24 1121          Plan of Care Review    Plan  of Care Reviewed With patient;family  -SD     Progress improving  -SD     Outcome Evaluation Pt and family offered  cart, however declined due to patient preferring family to translate when needed. Pt able to progress gait distance this date, amb 120ft in hallway with RW and CGA. Pt dem wide YOHAN with bilat sway side to side. Pt needed frequent standing rest breaks due to SOA and fatigue. Pt remains below baseline level of function for mobility and IPPT services continue to be warranted at this time.  -SD       Row Name 06/25/24 1121          Vital Signs    Pre Systolic BP Rehab 120  -SD     Pre Treatment Diastolic BP 78  -SD     Post SpO2 (%) 97  -SD     O2 Delivery Post Treatment room air  -SD     Pre Patient Position Sitting  -SD     Post Patient Position Sitting  -SD       Row Name 06/25/24 1121          Positioning and Restraints    Pre-Treatment Position sitting in chair/recliner  -SD     Post Treatment Position chair  -SD     In Chair notified nsg;sitting;call light within reach;encouraged to call for assist;with family/caregiver  -SD               User Key  (r) = Recorded By, (t) = Taken By, (c) = Cosigned By      Initials Name Provider Type    Marbella Sinha, PT Physical Therapist                   Outcome Measures       Row Name 06/25/24 1123 06/25/24 0908       How much help from another person do you currently need...    Turning from your back to your side while in flat bed without using bedrails? 3  -SD 3  -DV    Moving from lying on back to sitting on the side of a flat bed without bedrails? 3  -SD 3  -DV    Moving to and from a bed to a chair (including a wheelchair)? 3  -SD 3  -DV    Standing up from a chair using your arms (e.g., wheelchair, bedside chair)? 3  -SD 3  -DV    Climbing 3-5 steps with a railing? 3  -SD 3  -DV    To walk in hospital room? 3  -SD 3  -DV    AM-PAC 6 Clicks Score (PT) 18  -SD 18  -DV    Highest Level of Mobility Goal 6 --> Walk 10 steps or more  -SD 6 -->  Walk 10 steps or more  -DV      Row Name 06/25/24 1123          Functional Assessment    Outcome Measure Options AM-PAC 6 Clicks Basic Mobility (PT)  -SD               User Key  (r) = Recorded By, (t) = Taken By, (c) = Cosigned By      Initials Name Provider Type    Marbella Sinha PT Physical Therapist    Sarahy Felix RN Registered Nurse                                 Physical Therapy Education       Title: PT OT SLP Therapies (Done)       Topic: Physical Therapy (Done)       Point: Mobility training (Done)       Learning Progress Summary             Patient Acceptance, E, VU by SD at 6/25/2024 1123    Acceptance, E,I, VU by DV at 6/24/2024 1807    Acceptance, E, NR by AS at 6/23/2024 1038    Acceptance, E, NR by LM at 6/20/2024 1049   Family Acceptance, E, VU by SD at 6/25/2024 1123    Acceptance, E,I, VU by DV at 6/24/2024 1807                         Point: Home exercise program (Done)       Learning Progress Summary             Patient Acceptance, E, VU by SD at 6/25/2024 1123    Acceptance, E,I, VU by DV at 6/24/2024 1807    Acceptance, E, NR by AS at 6/23/2024 1038   Family Acceptance, E, VU by SD at 6/25/2024 1123    Acceptance, E,I, VU by DV at 6/24/2024 1807                         Point: Body mechanics (Done)       Learning Progress Summary             Patient Acceptance, E, VU by SD at 6/25/2024 1123    Acceptance, E,I, VU by DV at 6/24/2024 1807    Acceptance, E, NR by AS at 6/23/2024 1038   Family Acceptance, E, VU by SD at 6/25/2024 1123    Acceptance, E,I, VU by DV at 6/24/2024 1807                         Point: Precautions (Done)       Learning Progress Summary             Patient Acceptance, E, VU by SD at 6/25/2024 1123    Acceptance, E,I, VU by DV at 6/24/2024 1807    Acceptance, E, NR by AS at 6/23/2024 1038    Acceptance, E, NR by LM at 6/20/2024 1049   Family Acceptance, E, VU by SD at 6/25/2024 1123    Acceptance, E,I, VU by DV at 6/24/2024 5784                                          User Key       Initials Effective Dates Name Provider Type Discipline    SD 03/13/23 -  Marbella Espinoza, PT Physical Therapist PT    AS 04/28/23 -  Luiza Blackburn, PTA Physical Therapist Assistant PT    LM 07/11/23 -  Carrie Diaz, MEÑO Physical Therapist PT    DV 01/12/24 -  Sarahy Schultz RN Registered Nurse Nurse                  PT Recommendation and Plan     Plan of Care Reviewed With: patient, family  Progress: improving  Outcome Evaluation: Pt and family offered  cart, however declined due to patient preferring family to translate when needed. Pt able to progress gait distance this date, amb 120ft in hallway with RW and CGA. Pt dem wide YOHAN with bilat sway side to side. Pt needed frequent standing rest breaks due to SOA and fatigue. Pt remains below baseline level of function for mobility and IPPT services continue to be warranted at this time.     Time Calculation:         PT Charges       Row Name 06/25/24 1124             Time Calculation    Start Time 1037  -SD      PT Received On 06/25/24  -SD      PT Goal Re-Cert Due Date 06/30/24  -SD         Time Calculation- PT    Total Timed Code Minutes- PT 18 minute(s)  -SD         Timed Charges    68644 - Gait Training Minutes  18  -SD         Total Minutes    Timed Charges Total Minutes 18  -SD       Total Minutes 18  -SD                User Key  (r) = Recorded By, (t) = Taken By, (c) = Cosigned By      Initials Name Provider Type    SD Marbella Espinoza, MEÑO Physical Therapist                  Therapy Charges for Today       Code Description Service Date Service Provider Modifiers Qty    21222966446 HC GAIT TRAINING EA 15 MIN 6/25/2024 Marbella Espinoza, PT GP 1            PT G-Codes  Outcome Measure Options: AM-PAC 6 Clicks Basic Mobility (PT)  AM-PAC 6 Clicks Score (PT): 18  AM-PAC 6 Clicks Score (OT): 15       Marbella Espinoza PT  6/25/2024

## 2024-06-25 NOTE — THERAPY PROGRESS REPORT/RE-CERT
Patient Name: Moon Lerma  : 1955    MRN: [id number]                              Today's Date: 2024       Admit Date: 2024    Visit Dx:     ICD-10-CM ICD-9-CM   1. Acute blood loss anemia  D62 285.1   2. Acute renal failure, unspecified acute renal failure type  N17.9 584.9   3. Acute hyperkalemia  E87.5 276.7   4. Acute GI bleeding  K92.2 578.9     Patient Active Problem List   Diagnosis    Renal failure    Acute blood loss anemia    Hyperkalemia    Acute upper GI bleeding    Chronic deep vein thrombosis (DVT)    Primary hypertension    Chronic acquired lymphedema    Chronic cutaneous venous stasis ulcer     Past Medical History:   Diagnosis Date    Breast cancer     Clotting disorder     DVT (deep venous thrombosis)     Hypertension      Past Surgical History:   Procedure Laterality Date    MASTECTOMY, PARTIAL      left breast      General Information       Row Name 24 1040          OT Time and Intention    Document Type progress note/recertification  -     Mode of Treatment occupational therapy  -       Row Name 24 1040          General Information    Existing Precautions/Restrictions fall;other (see comments)  patient and family refused interpretor cart, requested to interpret for patient.  -       Row Name 24 1040          Cognition    Orientation Status (Cognition) oriented x 3  -       Row Name 24 1040          Safety Issues, Functional Mobility    Impairments Affecting Function (Mobility) balance;endurance/activity tolerance;pain;strength;shortness of breath  -               User Key  (r) = Recorded By, (t) = Taken By, (c) = Cosigned By      Initials Name Provider Type    SW Allison Hahn OT Occupational Therapist                     Mobility/ADL's       Row Name 24 1040          Bed Mobility    Comment, (Bed Mobility) UIC  -       Row Name 24 1040          Transfers    Transfers sit-stand transfer  -       Row Name 24 1040           Sit-Stand Transfer    Sit-Stand [place] (Transfers) standby assist  -     Assistive Device (Sit-Stand Transfers) walker, front-wheeled  -SW       Row Name 06/25/24 1040          Functional Mobility    Functional Mobility- Ind. Level verbal cues required;nonverbal cues required (demo/gesture);contact guard assist  -     Functional Mobility- Device walker, front-wheeled  -SW     Functional Mobility-Distance (Feet) 50  -     Functional Mobility- Comment Required standing rest breaks.  -       Row Name 06/25/24 1040          Activities of Daily Living    BADL Assessment/Intervention upper body dressing;grooming  -       Row Name 06/25/24 1040          Lower Body Dressing Assessment/Training    [place] Level (Lower Body Dressing) don;socks;dependent (less than 25% patient effort)  -SW     Position (Lower Body Dressing) unsupported sitting  -       Row Name 06/25/24 1040          Grooming Assessment/Training    [place] Level (Grooming) grooming skills;wash face, hands;set up  -SW     Position (Grooming) unsupported sitting  -       Row Name 06/25/24 1040          Upper Body Dressing Assessment/Training    [place] Level (Upper Body Dressing) upper body dressing skills;front opening garment;pajama/robe;minimum assist (75% patient effort)  -SW     Position (Upper Body Dressing) unsupported sitting  -               User Key  (r) = Recorded By, (t) = Taken By, (c) = Cosigned By      Initials Name Provider Type     Allison Hahn OT Occupational Therapist                   Obj/Interventions       Row Name 06/25/24 1040          Shoulder (Therapeutic Exercise)    Shoulder (Therapeutic Exercise) AROM (active range of motion)  -     Shoulder AROM (Therapeutic Exercise) bilateral;flexion;extension;sitting;10 repetitions  -       Row Name 06/25/24 1040          Elbow/Forearm (Therapeutic Exercise)    Elbow/Forearm (Therapeutic Exercise) AROM (active range of motion)  -     Elbow/Forearm AROM  (Therapeutic Exercise) bilateral;flexion;extension;sitting;10 repetitions  -       Row Name 06/25/24 1040          Motor Skills    Therapeutic Exercise shoulder;elbow/forearm  -       Row Name 06/25/24 1040          Balance    Balance Assessment sitting static balance;sitting dynamic balance;sit to stand dynamic balance;standing static balance;standing dynamic balance  -SW     Static Sitting Balance standby assist  -SW     Dynamic Sitting Balance supervision  -SW     Position, Sitting Balance unsupported  -SW     Sit to Stand Dynamic Balance standby assist  -SW     Static Standing Balance standby assist  -SW     Dynamic Standing Balance contact guard  -SW     Position/Device Used, Standing Balance supported  -SW     Balance Interventions sitting;standing;sit to stand;supported;static;dynamic;minimal challenge;occupation based/functional task  -               User Key  (r) = Recorded By, (t) = Taken By, (c) = Cosigned By      Initials Name Provider Type    Allison Diallo OT Occupational Therapist                   Goals/Plan       Row Name 06/25/24 1040          Bed Mobility Goal 1 (OT)    Activity/Assistive Device (Bed Mobility Goal 1, OT) sit to supine;supine to sit  -SW     [place] Level/Cues Needed (Bed Mobility Goal 1, OT) standby assist  -SW     Time Frame (Bed Mobility Goal 1, OT) long term goal (LTG);10 days  -SW     Progress/Outcomes (Bed Mobility Goal 1, OT) continuing progress toward goal;goal ongoing;goal revised this date  -       Row Name 06/25/24 1040          Transfer Goal 1 (OT)    Activity/Assistive Device (Transfer Goal 1, OT) bed-to-chair/chair-to-bed;toilet  -SW     [place] Level/Cues Needed (Transfer Goal 1, OT) modified independence  -SW     Time Frame (Transfer Goal 1, OT) long term goal (LTG);10 days  -SW     Progress/Outcome (Transfer Goal 1, OT) continuing progress toward goal;goal ongoing;goal revised this date  -       Row Name 06/25/24 1040          Toileting Goal 1  (OT)    Activity/Device (Toileting Goal 1, OT) adjust/manage clothing;perform perineal hygiene  -SW     [place] Level/Cues Needed (Toileting Goal 1, OT) minimum assist (75% or more patient effort)  -SW     Time Frame (Toileting Goal 1, OT) long term goal (LTG);by discharge  -SW     Progress/Outcome (Toileting Goal 1, OT) goal ongoing  -       Row Name 06/25/24 1040          Grooming Goal 1 (OT)    Activity/Device (Grooming Goal 1, OT) oral care  -SW     [place] (Grooming Goal 1, OT) standby assist  -SW     Time Frame (Grooming Goal 1, OT) short term goal (STG);5 days  -SW     Strategies/Barriers (Grooming Goal 1, OT) standing sink side  -SW     Progress/Outcome (Grooming Goal 1, OT) goal met  -SW               User Key  (r) = Recorded By, (t) = Taken By, (c) = Cosigned By      Initials Name Provider Type    Allison Diallo OT Occupational Therapist                   Clinical Impression       [facility] Name 06/25/24 1040          Pain Assessment    Pretreatment Pain Rating 0/10 - no pain  -SW     Posttreatment Pain Rating 0/10 - no pain  -[facility] Name 06/25/24 1040          Plan of Care Review    Plan of Care Reviewed With patient;family  -SW     Progress improving  -SW     Outcome Evaluation OT promoted act ebenezer and adl retraining. Pt min assist for ubd, dep for donning socks, sba for sts, cga for mob at rw level with standing rest breaks. Pt completed ub te with discussion of act routine at home. Recert complete and recommend continued IPOT and HHOT at d/c.  -       Row Name 06/25/24 1040          Therapy Plan Review/Discharge Plan (OT)    Anticipated Discharge Disposition (OT) home with assist;home with home health  -       Row Name 06/25/24 1040          Vital Signs    Pre Systolic BP Rehab 120  -SW     Pre Treatment Diastolic BP 78  -SW     Pretreatment Heart Rate (beats/min) 85  -SW     Pre SpO2 (%) 97  -SW     O2 Delivery Pre Treatment room air  -SW     O2 Delivery Intra Treatment room air  -SW      O2 Delivery Post Treatment room air  -SW     Pre Patient Position Sitting  -SW     Intra Patient Position Standing  -SW     Post Patient Position Sitting  -SW       Row Name 06/25/24 1040          Positioning and Restraints    Pre-Treatment Position sitting in chair/recliner  -SW     Post Treatment Position chair  -SW     In Chair notified nsg;reclined;sitting;call light within reach;encouraged to call for assist;exit alarm on;waffle cushion;legs elevated  -SW               User Key  (r) = Recorded By, (t) = Taken By, (c) = Cosigned By      Initials Name Provider Type    Allison Diallo, OT Occupational Therapist                   Outcome Measures       Row Name 06/25/24 1323          How much help from another is currently needed...    Putting on and taking off regular lower body clothing? 1  -SW     Bathing (including washing, rinsing, and drying) 2  -SW     Toileting (which includes using toilet bed pan or urinal) 2  -SW     Putting on and taking off regular upper body clothing 3  -SW     Taking care of personal grooming (such as brushing teeth) 4  -SW     Eating meals 4  -SW     AM-PAC 6 Clicks Score (OT) 16  -SW       Row Name 06/25/24 1123 06/25/24 0908       How much help from another person do you currently need...    Turning from your back to your side while in flat bed without using bedrails? 3  -SD 3  -DV    Moving from lying on back to sitting on the side of a flat bed without bedrails? 3  -SD 3  -DV    Moving to and from a bed to a chair (including a wheelchair)? 3  -SD 3  -DV    Standing up from a chair using your arms (e.g., wheelchair, bedside chair)? 3  -SD 3  -DV    Climbing 3-5 steps with a railing? 3  -SD 3  -DV    To walk in hospital room? 3  -SD 3  -DV    AM-PAC 6 Clicks Score (PT) 18  -SD 18  -DV    Highest Level of Mobility Goal 6 --> Walk 10 steps or more  -SD 6 --> Walk 10 steps or more  -DV      Row Name 06/25/24 1323 06/25/24 1123       Functional Assessment    Outcome Measure Options  AM-PAC 6 Clicks Daily Activity (OT)  - AM-PAC 6 Clicks Basic Mobility (PT)  -SD              User Key  (r) = Recorded By, (t) = Taken By, (c) = Cosigned By      Initials Name Provider Type    Marbella Sinha, PT Physical Therapist    Allison Diallo, OT Occupational Therapist    Sarahy Felix, RN Registered Nurse                    Occupational Therapy Education       Title: PT OT SLP Therapies (Done)       Topic: Occupational Therapy (Done)       Point: ADL training (Done)       Description:   Instruct learner(s) on proper safety adaptation and remediation techniques during self care or transfers.   Instruct in proper use of assistive devices.                  Learning Progress Summary             Patient Acceptance, E, VU by KRISTOFER at 6/25/2024 1324    Acceptance, E,I, VU by SUHAIL at 6/24/2024 1807    Acceptance, E, NR by REID at 6/20/2024 1049   Family Acceptance, E, VU by KRISTOFER at 6/25/2024 1324    Acceptance, E,I, VU by SUHAIL at 6/24/2024 1807    Acceptance, E, VU by AC at 6/20/2024 1049                         Point: Home exercise program (Done)       Description:   Instruct learner(s) on appropriate technique for monitoring, assisting and/or progressing therapeutic exercises/activities.                  Learning Progress Summary             Patient Acceptance, E,I, VU by SUHAIL at 6/24/2024 1807   Family Acceptance, E,I, VU by DV at 6/24/2024 1807                         Point: Precautions (Done)       Description:   Instruct learner(s) on prescribed precautions during self-care and functional transfers.                  Learning Progress Summary             Patient Acceptance, E, VU by KRISTOFER at 6/25/2024 1324    Acceptance, E,I, VU by SUHAIL at 6/24/2024 1807   Family Acceptance, E, VU by KRISTOFER at 6/25/2024 1324    Acceptance, E,I, VU by DV at 6/24/2024 1807                         Point: Body mechanics (Done)       Description:   Instruct learner(s) on proper positioning and spine alignment during self-care, functional mobility  activities and/or exercises.                  Learning Progress Summary             Patient Acceptance, E,I, VU by DV at 6/24/2024 1807   Family Acceptance, E,I, VU by DV at 6/24/2024 1807                                         User Key       Initials Effective Dates Name Provider Type Discipline    AC 02/03/23 -  Yenni Aguirre OT Occupational Therapist OT    SW 06/16/21 -  Allison Hahn OT Occupational Therapist OT    DV 01/12/24 -  Sarahy Schultz RN Registered Nurse Nurse                  OT Recommendation and Plan     Plan of Care Review  Plan of Care Reviewed With: patient, family  Progress: improving  Outcome Evaluation: OT promoted act [place]ene[place] and adl retraining. Pt min assist for ubd, dep for donning socks, sba for sts, cga for mob at rw level with standing rest breaks. Pt completed ub te with discussion of act routine at home. Recert complete and recommend continued IPOT and HHOT at d/c.     Time Calculation:         Time Calculation- OT       Row Name 06/25/24 1124 06/25/24 1040          Time Calculation- OT    OT Start Time -- 1040  -SW     OT Received On -- 06/25/24  -     OT Goal Re-Cert Due Date -- 07/05/24  -        Timed Charges    52796 - OT Therapeutic Exercise Minutes -- 8  -SW     45600 - Gait Training Minutes  18  -SD --     90290 - OT Self Care/Mgmt Minutes -- 15  -SW        Total Minutes    Timed Charges Total Minutes 18  -SD 23  -SW      Total Minutes 18  -SD 23  -SW               User Key  (r) = Recorded By, (t) = Taken By, (c) = Cosigned By      Initials Name Provider Type    Marbella Sinha, PT Physical Therapist     Allison Hahn OT Occupational Therapist                  Therapy Charges for Today       Code Description Service Date Service Provider Modifiers Qty    31743515757 HC OT THER PROC EA 15 MIN 6/25/2024 Allison Hahn OT GO 1    32127352257 HC OT SELF CARE/MGMT/TRAIN EA 15 MIN 6/25/2024 Allison Hahn OT GO 1                 Allison Hahn OT  6/25/2024

## 2024-06-25 NOTE — PLAN OF CARE
Goal Outcome Evaluation:  Plan of Care Reviewed With: patient, family        Progress: improving      A &O x4. VSS. Non tele. O2 sat >95% on RA. Denies CP/SOB.  Denies N/V/D. Afebrile. 1PA to BR with walker. No BM. Monitor s/s of active bleeding. Pt sitting in chair throughout this shift. Family at bedside. No other concerns assessed or stated.

## 2024-06-26 ENCOUNTER — APPOINTMENT (OUTPATIENT)
Dept: CARDIOLOGY | Facility: HOSPITAL | Age: 69
End: 2024-06-26
Payer: COMMERCIAL

## 2024-06-26 LAB
ANION GAP SERPL CALCULATED.3IONS-SCNC: 16 MMOL/L (ref 5–15)
ASCENDING AORTA: 3.2 CM
BH CV ECHO MEAS - AO MAX PG: 6.2 MMHG
BH CV ECHO MEAS - AO MEAN PG: 3.7 MMHG
BH CV ECHO MEAS - AO ROOT DIAM: 3.3 CM
BH CV ECHO MEAS - AO V2 MAX: 124.7 CM/SEC
BH CV ECHO MEAS - AO V2 VTI: 22.8 CM
BH CV ECHO MEAS - AVA(I,D): 2.5 CM2
BH CV ECHO MEAS - EDV(CUBED): 97.3 ML
BH CV ECHO MEAS - ESV(CUBED): 35.9 ML
BH CV ECHO MEAS - FS: 28.3 %
BH CV ECHO MEAS - IVS/LVPW: 1 CM
BH CV ECHO MEAS - IVSD: 0.9 CM
BH CV ECHO MEAS - LA DIMENSION: 3.4 CM
BH CV ECHO MEAS - LAT PEAK E' VEL: 11.8 CM/SEC
BH CV ECHO MEAS - LV MASS(C)D: 137.7 GRAMS
BH CV ECHO MEAS - LV MAX PG: 6.4 MMHG
BH CV ECHO MEAS - LV MEAN PG: 3 MMHG
BH CV ECHO MEAS - LV V1 MAX: 126 CM/SEC
BH CV ECHO MEAS - LV V1 VTI: 20.1 CM
BH CV ECHO MEAS - LVIDD: 4.6 CM
BH CV ECHO MEAS - LVIDS: 3.3 CM
BH CV ECHO MEAS - LVOT AREA: 2.8 CM2
BH CV ECHO MEAS - LVOT DIAM: 1.9 CM
BH CV ECHO MEAS - LVPWD: 0.9 CM
BH CV ECHO MEAS - MED PEAK E' VEL: 11.8 CM/SEC
BH CV ECHO MEAS - MV A MAX VEL: 113 CM/SEC
BH CV ECHO MEAS - MV DEC SLOPE: 1288 CM/SEC2
BH CV ECHO MEAS - MV DEC TIME: 0.16 SEC
BH CV ECHO MEAS - MV E MAX VEL: 90.7 CM/SEC
BH CV ECHO MEAS - MV E/A: 0.8
BH CV ECHO MEAS - MV MAX PG: 3.1 MMHG
BH CV ECHO MEAS - MV MEAN PG: 2 MMHG
BH CV ECHO MEAS - MV P1/2T: 19.6 MSEC
BH CV ECHO MEAS - MV V2 VTI: 19.1 CM
BH CV ECHO MEAS - MVA(P1/2T): 11.2 CM2
BH CV ECHO MEAS - MVA(VTI): 3 CM2
BH CV ECHO MEAS - PA ACC TIME: 0.07 SEC
BH CV ECHO MEAS - PA V2 MAX: 123.5 CM/SEC
BH CV ECHO MEAS - RAP SYSTOLE: 8 MMHG
BH CV ECHO MEAS - RVSP: 18 MMHG
BH CV ECHO MEAS - SV(LVOT): 57 ML
BH CV ECHO MEAS - TAPSE (>1.6): 2.38 CM
BH CV ECHO MEAS - TR MAX PG: 10.2 MMHG
BH CV ECHO MEAS - TR MAX VEL: 160 CM/SEC
BH CV ECHO MEASUREMENTS AVERAGE E/E' RATIO: 7.69
BH CV VAS BP LEFT ARM: NORMAL MMHG
BH CV XLRA - RV BASE: 3.2 CM
BH CV XLRA - RV LENGTH: 6.4 CM
BH CV XLRA - RV MID: 2.6 CM
BH CV XLRA - TDI S': 26.1 CM/SEC
BUN SERPL-MCNC: 66 MG/DL (ref 8–23)
BUN/CREAT SERPL: 13.6 (ref 7–25)
CALCIUM SPEC-SCNC: 8.8 MG/DL (ref 8.6–10.5)
CHLORIDE SERPL-SCNC: 103 MMOL/L (ref 98–107)
CO2 SERPL-SCNC: 20 MMOL/L (ref 22–29)
CREAT SERPL-MCNC: 4.84 MG/DL (ref 0.57–1)
DEPRECATED RDW RBC AUTO: 51.5 FL (ref 37–54)
DSDNA AB SER-ACNC: <1 IU/ML (ref 0–9)
EGFRCR SERPLBLD CKD-EPI 2021: 9.3 ML/MIN/1.73
ERYTHROCYTE [DISTWIDTH] IN BLOOD BY AUTOMATED COUNT: 15.6 % (ref 12.3–15.4)
GBM AB SER IA-ACNC: <0.2 UNITS (ref 0–0.9)
GLUCOSE SERPL-MCNC: 95 MG/DL (ref 65–99)
HCT VFR BLD AUTO: 23.1 % (ref 34–46.6)
HGB BLD-MCNC: 7.5 G/DL (ref 12–15.9)
INR PPP: 2.12 (ref 0.89–1.12)
LEFT ATRIUM VOLUME INDEX: 9.5 ML/M2
MCH RBC QN AUTO: 29.8 PG (ref 26.6–33)
MCHC RBC AUTO-ENTMCNC: 32.5 G/DL (ref 31.5–35.7)
MCV RBC AUTO: 91.7 FL (ref 79–97)
PLATELET # BLD AUTO: 130 10*3/MM3 (ref 140–450)
PMV BLD AUTO: 11.3 FL (ref 6–12)
POTASSIUM SERPL-SCNC: 3.8 MMOL/L (ref 3.5–5.2)
PROTHROMBIN TIME: 23.9 SECONDS (ref 12.2–14.5)
RBC # BLD AUTO: 2.52 10*6/MM3 (ref 3.77–5.28)
SODIUM SERPL-SCNC: 139 MMOL/L (ref 136–145)
WBC NRBC COR # BLD AUTO: 5.71 10*3/MM3 (ref 3.4–10.8)

## 2024-06-26 PROCEDURE — 99232 SBSQ HOSP IP/OBS MODERATE 35: CPT | Performed by: INTERNAL MEDICINE

## 2024-06-26 PROCEDURE — 85610 PROTHROMBIN TIME: CPT | Performed by: INTERNAL MEDICINE

## 2024-06-26 PROCEDURE — 93970 EXTREMITY STUDY: CPT

## 2024-06-26 PROCEDURE — 99232 SBSQ HOSP IP/OBS MODERATE 35: CPT | Performed by: PHYSICIAN ASSISTANT

## 2024-06-26 PROCEDURE — 93306 TTE W/DOPPLER COMPLETE: CPT | Performed by: INTERNAL MEDICINE

## 2024-06-26 PROCEDURE — 93970 EXTREMITY STUDY: CPT | Performed by: INTERNAL MEDICINE

## 2024-06-26 PROCEDURE — 97597 DBRDMT OPN WND 1ST 20 CM/<: CPT

## 2024-06-26 PROCEDURE — 80048 BASIC METABOLIC PNL TOTAL CA: CPT | Performed by: INTERNAL MEDICINE

## 2024-06-26 PROCEDURE — 85027 COMPLETE CBC AUTOMATED: CPT | Performed by: INTERNAL MEDICINE

## 2024-06-26 PROCEDURE — 93306 TTE W/DOPPLER COMPLETE: CPT

## 2024-06-26 RX ORDER — PEG-3350, SODIUM SULFATE, SODIUM CHLORIDE, POTASSIUM CHLORIDE, SODIUM ASCORBATE AND ASCORBIC ACID 7.5-2.691G
1000 KIT ORAL
Status: COMPLETED | OUTPATIENT
Start: 2024-06-28 | End: 2024-06-28

## 2024-06-26 RX ORDER — PEG-3350, SODIUM SULFATE, SODIUM CHLORIDE, POTASSIUM CHLORIDE, SODIUM ASCORBATE AND ASCORBIC ACID 7.5-2.691G
1000 KIT ORAL
Status: COMPLETED | OUTPATIENT
Start: 2024-06-27 | End: 2024-06-27

## 2024-06-26 RX ADMIN — PANTOPRAZOLE SODIUM 40 MG: 40 INJECTION, POWDER, FOR SOLUTION INTRAVENOUS at 09:43

## 2024-06-26 RX ADMIN — PANTOPRAZOLE SODIUM 40 MG: 40 INJECTION, POWDER, FOR SOLUTION INTRAVENOUS at 17:56

## 2024-06-26 RX ADMIN — NEBIVOLOL 2.5 MG: 2.5 TABLET ORAL at 10:20

## 2024-06-26 RX ADMIN — DIPHENHYDRAMINE HYDROCHLORIDE AND LIDOCAINE HYDROCHLORIDE AND ALUMINUM HYDROXIDE AND MAGNESIUM HYDRO 5 ML: KIT at 09:42

## 2024-06-26 RX ADMIN — Medication 10 ML: at 09:44

## 2024-06-26 RX ADMIN — TERAZOSIN HYDROCHLORIDE 2 MG: 2 CAPSULE ORAL at 22:22

## 2024-06-26 RX ADMIN — DIPHENHYDRAMINE HYDROCHLORIDE AND LIDOCAINE HYDROCHLORIDE AND ALUMINUM HYDROXIDE AND MAGNESIUM HYDRO 5 ML: KIT at 14:54

## 2024-06-26 RX ADMIN — Medication 10 ML: at 22:22

## 2024-06-26 NOTE — CASE MANAGEMENT/SOCIAL WORK
Continued Stay Note  [facility]     Patient Name: Moon Lerma  MRN: [id number]  Today's Date: 6/26/2024    Admit Date: 6/19/2024    Plan: home   Discharge Plan       Row Name 06/26/24 1422       Plan    Plan Comments Per MDR, patient is not medically ready for discharge today. Patient's plan is to return home with her daughter in [employer]. CM will continue to follow.    Final Discharge Disposition Code 01 - home or self-care                   Discharge Codes    No documentation.                 Expected Discharge Date and Time       Expected Discharge Date Expected Discharge Time    Jun 30, 2024               Afia Roy RN

## 2024-06-26 NOTE — PLAN OF CARE
Goal Outcome Evaluation:  Plan of Care Reviewed With: patient, family        Progress: improving  Outcome Evaluation: Pt with good improvements in BLE edema, now with minimal remaining edema. Pt declined MLW d/t discomfort and lack of swelling. PT spent extensive time educating pt on proper donning/doffing of MLW and wear schedule. Provided pt/family with extra compressogrips for at home use for PRN edema management. Pt's L lateral 5th toe wound nearly fully resurfaced following debridement of crusts.

## 2024-06-26 NOTE — PROGRESS NOTES
"GI Daily Progress Note  Subjective:    Chief Complaint:  Melena     Reconsulted for melena.       Patient had a black bowel movement yesterday.   Denies nausea nor vomiting.   Also notes small epistaxis.    Her brother is at bedside whom assists in translation as well as history.          Objective:    /69 (BP Location: Left arm, Patient Position: Sitting)   Pulse 86   Temp 97.6 °F (36.4 °C) (Oral)   Resp 18   Ht 152.4 cm (60\")   Wt 128 kg (282 lb 3 oz)   SpO2 97%   BMI 55.11 kg/m²     Physical Exam  Constitutional:       General: She is not in acute distress.     Appearance: She is obese.   Cardiovascular:      Rate and Rhythm: Normal rate and regular rhythm.   Pulmonary:      Effort: Pulmonary effort is normal. No respiratory distress.   Abdominal:      General: Bowel sounds are normal. There is no distension.      Palpations: Abdomen is soft.      Tenderness: There is no abdominal tenderness.   Neurological:      Mental Status: She is alert and oriented to person, place, and time.         Lab  Lab Results   Component Value Date    WBC 5.71 06/26/2024    HGB 7.5 (L) 06/26/2024    HGB 7.7 (L) 06/25/2024    HGB 8.0 (L) 06/24/2024    MCV 91.7 06/26/2024     (L) 06/26/2024    INR 2.12 (H) 06/26/2024    INR 2.22 (H) 06/25/2024    INR 3.47 (H) 06/24/2024    INR 4.42 (H) 06/23/2024    INR 5.15 (C) 06/22/2024       Lab Results   Component Value Date    GLUCOSE 95 06/26/2024    BUN 66 (H) 06/26/2024    CREATININE 4.84 (H) 06/26/2024    BCR 13.6 06/26/2024     06/26/2024    K 3.8 06/26/2024    CO2 20.0 (L) 06/26/2024    CALCIUM 8.8 06/26/2024    ALBUMIN 3.2 (L) 06/19/2024    ALKPHOS 59 06/19/2024    BILITOT 0.3 06/19/2024    ALT 22 06/19/2024    AST 30 06/19/2024       Assessment:    Gastrointestinal bleeding with melena   Acute blood loss anemia   Supra therapeutic INR in the setting of Pradaxa use with renal failure.  INR >10 on arrival.  Improving, now 2.12.    Recent oral ulceration with " bleeding.  Resolved   Epistaxis   Acute renal failure  History of breast cancer, on chronic hormonal therapy.   History of DVT    Plan:    Patient with recurrent melena despite improvement in INR with holding Pradaxa.       >> Will begin clear liquid diet tomorrow in anticipation of colonoscopy and EGD on Friday, 6/28/24.     >> Split dose Moviprep to begin tomorrow afternoon     BHARAT Monet  06/26/24  14:30 EDT

## 2024-06-26 NOTE — PROGRESS NOTES
"          Clinical Nutrition Assessment     Patient Name: Moon Lerma  YOB: 1955  MRN: [id number]  Date of Encounter: 06/26/24 09:17 EDT  Admission date: 6/19/2024  Reason for Visit: LOS    Assessment   Nutrition Assessment   Admission Diagnosis:  Renal failure [N19]    Problem List:    Renal failure    Acute blood loss anemia    Hyperkalemia    Acute upper GI bleeding    Chronic deep vein thrombosis (DVT)    Primary hypertension    Chronic acquired lymphedema    Chronic cutaneous venous stasis ulcer      PMH:   She  has a past medical history of Breast cancer, Clotting disorder, DVT (deep venous thrombosis), and Hypertension.    PSH:  She  has a past surgical history that includes Mastectomy, partial.    Applicable Nutrition History:       Anthropometrics     Height: Height: 152.4 cm (60\")  Last Filed Weight: Weight: 128 kg (282 lb 3 oz) (06/19/24 1315)  Method: Weight Method: Stated  BMI: BMI (Calculated): 55.1    UBW:  130kg/286lbs in April 2024 per family report  Weight change: unchanged, however limited wt data available   Weight       Weight (kg) Weight (lbs) Weight Method Visit Report   6/19/2024 128 kg  282 lb 3 oz  Stated  --     128.368 kg  283 lb   --      Nutrition Focused Physical Exam    Date: 6/26    Pt does not meet criteria for malnutrition diagnosis, at this time.      Subjective   Reported/Observed/Food/Nutrition Related History:     Nutrition hx obtained from pt's family at bedside, family preferred not to use . Pt has had decreased po intake x 10 days, pt suspects eating ~50% of usual intake 2/2 tongue blister. Pt unsure of recent wt loss though states QOJ=153kd. Family states pt's most recent wt was in April. Family had questions regarding renal diet-answered questions within scope of practice. Pt denies chewing/swallowing difficulty and NKFA.     Current Nutrition Prescription   PO: Diet: Regular/House, Vegetarian, Renal; Lacto-Ovo Vegetarian (Allows dairy, eggs); " Low Potassium; Fluid Consistency: Thin (IDDSI 0)  Oral Nutrition Supplement:   Intake:  63% x 4 meals    Assessment & Plan   Nutrition Diagnosis   Date: 6/26             Updated:    Problem Predicted suboptimal energy intake    Etiology Tongue blister   Signs/Symptoms Reported po intake <50% of usual   Status: New    Goal / Objectives:   Nutrition to support treatment and Increase intake      Nutrition Intervention      Follow treatment progress, Care plan reviewed, Interview for preferences, Encourage intake, Supplement provided    Ordered Boost GC (butch) daily w/lunch    Monitoring/Evaluation:   Per protocol, PO intake, Supplement intake, Weight, Swallow function (tongue blister)    Park Diggs, MS,RD,LD  Time Spent:25

## 2024-06-26 NOTE — THERAPY WOUND CARE TREATMENT
Acute Care - Wound/Debridement Treatment Note  [facility]     Patient Name: Moon Lerma  : 1955  MRN: [id number]  Today's Date: 2024                Admit Date: 2024    Visit Dx:    ICD-10-CM ICD-9-CM   1. Acute blood loss anemia  D62 285.1   2. Acute renal failure, unspecified acute renal failure type  N17.9 584.9   3. Acute hyperkalemia  E87.5 276.7   4. Acute GI bleeding  K92.2 578.9       Patient Active Problem List   Diagnosis    Renal failure    Acute blood loss anemia    Hyperkalemia    Acute upper GI bleeding    Chronic deep vein thrombosis (DVT)    Primary hypertension    Chronic acquired lymphedema    Chronic cutaneous venous stasis ulcer        Past Medical History:   Diagnosis Date    Breast cancer     Clotting disorder     DVT (deep venous thrombosis)     Hypertension         Past Surgical History:   Procedure Laterality Date    MASTECTOMY, PARTIAL      left breast               Lymphedema       Row Name 24 1100             Lymphedema Edema Assessment    Ptting Edema Category By severity  -      Pitting Edema Mild  -         Skin Changes/Observations    Location/Assessment Lower Extremity  -      Lower Extremity Conditions bilateral:;intact;clean;dry  -      Lower Extremity Color/Pigment bilateral:;hyperpigmented;brawny  considerable hemosiderin staining  -                User Key  (r) = Recorded By, (t) = Taken By, (c) = Cosigned By      Initials Name Provider Type     Damian Cabrera, PT Physical Therapist                    WOUND DEBRIDEMENT  Total area of Debridement: 2cm2  Debridement Site 1  Location- Site 1: L 5th toe  Selective Debridement- Site 1: Wound Surface <20cmsq  Instruments- Site 1: tweezers  Excised Tissue Description- Site 1: minimum, other (comment) (nonviable dry, flaking tissue/crusts)  Bleeding- Site 1: none               PT Assessment (Last 12 Hours)       PT Evaluation and Treatment       Row Name 24 1127          Physical Therapy  Time and Intention    Subjective Information complains of  -     Document Type therapy note (daily note);wound care  -     Mode of Treatment physical therapy;individual therapy  -     Comment  Amador Almonte, [id number], Spanish. Pt/family declined  use through  cart.  -       Row Name 06/26/24 1127          Pain    Pretreatment Pain Rating 0/10 - no pain  -     Posttreatment Pain Rating 0/10 - no pain  -       Row Name 06/26/24 1127          Cognition    Affect/Mental Status (Cognition) WFL  -     Orientation Status (Cognition) oriented x 3  -       Row Name 06/26/24 1127          Coping    Observed Emotional State calm;cooperative;pleasant  -     Verbalized Emotional State acceptance  -     Trust Relationship/Rapport care explained;questions answered  -     Family/Support Persons family  -     Involvement in Care at bedside;attentive to patient  -       Row Name 06/26/24 1127          Plan of Care Review    Plan of Care Reviewed With patient;family  -     Progress improving  -     Outcome Evaluation Pt with good improvements in BLE edema, now with minimal remaining edema. Pt declined MLW d/t discomfort and lack of swelling. PT spent extensive time educating pt on proper donning/doffing of MLW and wear schedule. Provided pt/family with extra compressogrips for at home use for PRN edema management. Pt's L lateral 5th toe wound nearly fully resurfaced following debridement of crusts.  -       Row Name 06/26/24 1127          Positioning and Restraints    Pre-Treatment Position in bed  -     Post Treatment Position bed  -     In Bed supine;call light within reach;encouraged to call for assist;with family/caregiver  -               User Key  (r) = Recorded By, (t) = Taken By, (c) = Cosigned By      Initials Name Provider Type     Damian Cabrera, PT Physical Therapist                  Physical Therapy Education       Title: PT OT SLP Therapies (Done)        Topic: Physical Therapy (Done)       Point: Mobility training (Done)       Learning Progress Summary             Patient Acceptance, E, VU by SD at 6/25/2024 1123    Acceptance, E,I, VU by DV at 6/24/2024 1807    Acceptance, E, NR by AS at 6/23/2024 1038    Acceptance, E, NR by LM at 6/20/2024 1049   Family Acceptance, E, VU by SD at 6/25/2024 1123    Acceptance, E,I, VU by DV at 6/24/2024 1807                         Point: Home exercise program (Done)       Learning Progress Summary             Patient Acceptance, E, VU by SD at 6/25/2024 1123    Acceptance, E,I, VU by DV at 6/24/2024 1807    Acceptance, E, NR by AS at 6/23/2024 1038   Family Acceptance, E, VU by SD at 6/25/2024 1123    Acceptance, E,I, VU by DV at 6/24/2024 1807                         Point: Body mechanics (Done)       Learning Progress Summary             Patient Acceptance, E, VU by SD at 6/25/2024 1123    Acceptance, E,I, VU by DV at 6/24/2024 1807    Acceptance, E, NR by AS at 6/23/2024 1038   Family Acceptance, E, VU by SD at 6/25/2024 1123    Acceptance, E,I, VU by DV at 6/24/2024 1807                         Point: Precautions (Done)       Learning Progress Summary             Patient Acceptance, E, VU by SD at 6/25/2024 1123    Acceptance, E,I, VU by DV at 6/24/2024 1807    Acceptance, E, NR by AS at 6/23/2024 1038    Acceptance, E, NR by LM at 6/20/2024 1049   Family Acceptance, E, VU by SD at 6/25/2024 1123    Acceptance, E,I, VU by DV at 6/24/2024 1807                                         User Key       Initials Effective Dates Name Provider Type Discipline    SD 03/13/23 -  Marbella Espinoza, PT Physical Therapist PT    AS 04/28/23 -  Luiza Blackburn, PTA Physical Therapist Assistant PT    LM 07/11/23 -  Carrie Diaz, MEÑO Physical Therapist PT    DV 01/12/24 -  Sarahy Schultz RN Registered Nurse Nurse                    Recommendation and Plan  Anticipated Discharge Disposition (PT): home with assist, home with home  health  Planned Therapy Interventions (PT): balance training, bed mobility training, gait training, home exercise program, motor coordination training, neuromuscular re-education, patient/family education, postural re-education, ROM (range of motion), stair training, strengthening, stretching, transfer training  Therapy Frequency (PT): daily  Plan of Care Reviewed With: patient, family   Progress: improving       Progress: improving  Outcome Evaluation: Pt with good improvements in BLE edema, now with minimal remaining edema. Pt declined MLW d/t discomfort and lack of swelling. PT spent extensive time educating pt on proper donning/doffing of MLW and wear schedule. Provided pt/family with extra compressogrips for at home use for PRN edema management. Pt's L lateral 5th toe wound nearly fully resurfaced following debridement of crusts.  Plan of Care Reviewed With: patient, family            Time Calculation   PT Charges       Row Name 06/26/24 1152             Time Calculation    Start Time 1127  -LH      PT Goal Re-Cert Due Date 06/30/24  -         Untimed Charges    Wound Care 49798 Selective debridement  -      [id number] debridement 15  -LH         Total Minutes    Untimed Charges Total Minutes 15  -LH       Total Minutes 15  -LH                User Key  (r) = Recorded By, (t) = Taken By, (c) = Cosigned By      Initials Name Provider Type     Damian Cabrera, PT Physical Therapist                      Therapy Charges for Today       Code Description Service Date Service Provider Modifiers Qty    14662812331 HC RUBEN DEBRIDE OPEN WOUND UP TO 20CM 6/26/2024 Damian Cabrera, PT GP 1              PT G-Codes  Outcome Measure Options: AM-PAC 6 Clicks Daily Activity (OT)  AM-PAC 6 Clicks Score (PT): 18  AM-PAC 6 Clicks Score (OT): 16       Damian Cabrera PT  6/26/2024

## 2024-06-26 NOTE — PROGRESS NOTES
" LOS: 7 days   Patient Care Team:  Carmen Singh PA-C as PCP - General (Physician Assistant)    Chief Complaint: Acute kidney injury   Anemia  GI bleed.    No CP or SOA , fever, chills, rigors, rash, N/V, Constipation.       Subjective     Doing well. Renal function continues to improve. Appetite improving         History taken from: patient    Objective     Vital Sign Min/Max for last 24 hours  Temp  Min: 97.4 °F (36.3 °C)  Max: 97.6 °F (36.4 °C)   BP  Min: 135/74  Max: 157/88   Pulse  Min: 74  Max: 86   Resp  Min: 16  Max: 18   SpO2  Min: 97 %  Max: 98 %   No data recorded   Weight  Min: 128 kg (282 lb)  Max: 128 kg (282 lb)     Flowsheet Rows      Flowsheet Row First Filed Value   Admission Height 152.4 cm (60\") Documented at 06/19/2024 1315   Admission Weight 128 kg (282 lb 3 oz) Documented at 06/19/2024 1315            No intake/output data recorded.  I/O last 3 completed shifts:  In: 290 [P.O.:290]  Out: -     Vitals:    06/25/24 2322 06/26/24 0938 06/26/24 1156 06/26/24 1618   BP: 149/80 136/69  135/74   BP Location: Left arm Left arm     Patient Position: Lying Sitting     Pulse: 74 86     Resp: 17  18    Temp: 97.6 °F (36.4 °C) 97.4 °F (36.3 °C) 97.6 °F (36.4 °C)    TempSrc: Oral Oral Oral    SpO2: 97%      Weight:    128 kg (282 lb)   Height:    152.4 cm (60\")         Gen: Alert, NAD   HENT: NC, AT, EOMI   NECK: Supple, no JVD, Trachea midline   LUNGS: CTA bilaterally, non labored respirtation   CVS: S1/S2 audible, RRR, no murmur   Abd: Soft, NT, ND, BS+   Ext: No pedal edema, no cyanosis   CNS: Alert, No focal deficit noted grossly  Psy: Cooperative  Skin: Warm, dry and intact        Results Review:     I reviewed the patient's new clinical results.    WBC WBC   Date Value Ref Range Status   06/26/2024 5.71 3.40 - 10.80 10*3/mm3 Final   06/25/2024 5.78 3.40 - 10.80 10*3/mm3 Final   06/24/2024 6.48 3.40 - 10.80 10*3/mm3 Final      HGB Hemoglobin   Date Value Ref Range Status   06/26/2024 7.5 (L) " "12.0 - 15.9 g/dL Final   06/25/2024 7.7 (L) 12.0 - 15.9 g/dL Final   06/24/2024 8.0 (L) 12.0 - 15.9 g/dL Final   06/24/2024 6.6 (C) 12.0 - 15.9 g/dL Final      HCT Hematocrit   Date Value Ref Range Status   06/26/2024 23.1 (L) 34.0 - 46.6 % Final   06/25/2024 23.6 (L) 34.0 - 46.6 % Final   06/24/2024 25.0 (L) 34.0 - 46.6 % Final   06/24/2024 20.6 (C) 34.0 - 46.6 % Final      Platlets No results found for: \"LABPLAT\"   MCV MCV   Date Value Ref Range Status   06/26/2024 91.7 79.0 - 97.0 fL Final   06/25/2024 90.4 79.0 - 97.0 fL Final   06/24/2024 92.8 79.0 - 97.0 fL Final          Sodium Sodium   Date Value Ref Range Status   06/26/2024 139 136 - 145 mmol/L Final   06/25/2024 139 136 - 145 mmol/L Final   06/24/2024 139 136 - 145 mmol/L Final      Potassium Potassium   Date Value Ref Range Status   06/26/2024 3.8 3.5 - 5.2 mmol/L Final   06/25/2024 4.0 3.5 - 5.2 mmol/L Final   06/24/2024 4.0 3.5 - 5.2 mmol/L Final      Chloride Chloride   Date Value Ref Range Status   06/26/2024 103 98 - 107 mmol/L Final   06/25/2024 104 98 - 107 mmol/L Final   06/24/2024 105 98 - 107 mmol/L Final      CO2 CO2   Date Value Ref Range Status   06/26/2024 20.0 (L) 22.0 - 29.0 mmol/L Final   06/25/2024 20.0 (L) 22.0 - 29.0 mmol/L Final   06/24/2024 22.0 22.0 - 29.0 mmol/L Final      BUN BUN   Date Value Ref Range Status   06/26/2024 66 (H) 8 - 23 mg/dL Final   06/25/2024 71 (H) 8 - 23 mg/dL Final   06/24/2024 80 (H) 8 - 23 mg/dL Final      Creatinine Creatinine   Date Value Ref Range Status   06/26/2024 4.84 (H) 0.57 - 1.00 mg/dL Final   06/25/2024 5.24 (H) 0.57 - 1.00 mg/dL Final   06/24/2024 5.42 (H) 0.57 - 1.00 mg/dL Final      Calcium Calcium   Date Value Ref Range Status   06/26/2024 8.8 8.6 - 10.5 mg/dL Final   06/25/2024 8.4 (L) 8.6 - 10.5 mg/dL Final   06/24/2024 8.2 (L) 8.6 - 10.5 mg/dL Final      PO4 No results found for: \"CAPO4\"   Albumin No results found for: \"ALBUMIN\"     Magnesium Magnesium   Date Value Ref Range Status " "  06/25/2024 1.8 1.6 - 2.4 mg/dL Final      Uric Acid No results found for: \"URICACID\"     Medication Review: Yes    Assessment & Plan       Renal failure    Acute blood loss anemia    Hyperkalemia    Acute upper GI bleeding    Chronic deep vein thrombosis (DVT)    Primary hypertension    Chronic acquired lymphedema    Chronic cutaneous venous stasis ulcer    Results from last 7 days   Lab Units 06/26/24  0327 06/25/24  0431 06/24/24  1829 06/24/24  0436 06/23/24  0435   SODIUM mmol/L 139 139  --  139 140   POTASSIUM mmol/L 3.8 4.0  --  4.0 3.9   CHLORIDE mmol/L 103 104  --  105 105   CO2 mmol/L 20.0* 20.0*  --  22.0 21.0*   BUN mg/dL 66* 71*  --  80* 82*   CREATININE mg/dL 4.84* 5.24*  --  5.42* 5.52*   CALCIUM mg/dL 8.8 8.4*  --  8.2* 8.2*   WBC 10*3/mm3 5.71 5.78  --  6.48 7.28   HEMOGLOBIN g/dL 7.5* 7.7* 8.0* 6.6* 7.6*   PLATELETS 10*3/mm3 130* 132*  --  147 176       Intake/Output Summary (Last 24 hours) at 6/26/2024 1628  Last data filed at 6/25/2024 2033  Gross per 24 hour   Intake 50 ml   Output --   Net 50 ml         Assessment & Plan       Acute kidney injury: Last known stable cr ~ 1.4mg/dl. Cr 6.7 mg/dl on admission. UA large alison esterase+ trace protein blood moderate ( Not a clean catch) UACR - 80.9. Non oliguric - DDx - Pre-quinten/ATN vs AIN - improving.      Hyperkalemia: In the setting of WALTER:      Met acidosis: Due to WALTER    Severe anemia: FOBT+. On AC. GI following. Plan for EGD     DANIEL: Likely due to severe anemia     Hx of breast cancer: On anastrozole     Supra-therapeutic INR: On dabigatran.  Concern for Gi bleed. FOBT positive.      Recs  - D/C IV fluids. Encourage po intake.   - Monitor I/O   - Avoid nephrotoxic agents.   - Renal diet   - Adjust meds per renal function   - No emergent need of RRT   - Monitor H/H and transfuse for Hgb less than 7.0   - ANCA panel pending. If no improvement, may need renal biopsy       Reji Deutsch MD  06/26/24  16:28 EDT            "
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
## 2024-06-27 PROBLEM — K92.2 ACUTE GI BLEEDING: Status: ACTIVE | Noted: 2024-06-19

## 2024-06-27 LAB
ANION GAP SERPL CALCULATED.3IONS-SCNC: 10 MMOL/L (ref 5–15)
BH CV LOWER VASCULAR LEFT COMMON FEMORAL AUGMENT: NORMAL
BH CV LOWER VASCULAR LEFT COMMON FEMORAL COMPRESS: NORMAL
BH CV LOWER VASCULAR LEFT COMMON FEMORAL PHASIC: NORMAL
BH CV LOWER VASCULAR LEFT COMMON FEMORAL SPONT: NORMAL
BH CV LOWER VASCULAR LEFT DISTAL FEMORAL COMPRESS: NORMAL
BH CV LOWER VASCULAR LEFT GASTRONEMIUS COMPRESS: NORMAL
BH CV LOWER VASCULAR LEFT GREATER SAPH AK COMPRESS: NORMAL
BH CV LOWER VASCULAR LEFT GREATER SAPH BK COMPRESS: NORMAL
BH CV LOWER VASCULAR LEFT LESSER SAPH COMPRESS: NORMAL
BH CV LOWER VASCULAR LEFT MID FEMORAL AUGMENT: NORMAL
BH CV LOWER VASCULAR LEFT MID FEMORAL COMPRESS: NORMAL
BH CV LOWER VASCULAR LEFT MID FEMORAL PHASIC: NORMAL
BH CV LOWER VASCULAR LEFT MID FEMORAL SPONT: NORMAL
BH CV LOWER VASCULAR LEFT PERONEAL COMPRESS: NORMAL
BH CV LOWER VASCULAR LEFT POPLITEAL AUGMENT: NORMAL
BH CV LOWER VASCULAR LEFT POPLITEAL COMPRESS: NORMAL
BH CV LOWER VASCULAR LEFT POPLITEAL PHASIC: NORMAL
BH CV LOWER VASCULAR LEFT POPLITEAL SPONT: NORMAL
BH CV LOWER VASCULAR LEFT POSTERIOR TIBIAL COMPRESS: NORMAL
BH CV LOWER VASCULAR LEFT PROXIMAL FEMORAL COMPRESS: NORMAL
BH CV LOWER VASCULAR LEFT SAPHENOFEMORAL JUNCTION COMPRESS: NORMAL
BH CV LOWER VASCULAR RIGHT ANTERIOR SAPH COMPRESS: NORMAL
BH CV LOWER VASCULAR RIGHT COMMON FEMORAL AUGMENT: NORMAL
BH CV LOWER VASCULAR RIGHT COMMON FEMORAL COMPRESS: NORMAL
BH CV LOWER VASCULAR RIGHT COMMON FEMORAL PHASIC: NORMAL
BH CV LOWER VASCULAR RIGHT COMMON FEMORAL SPONT: NORMAL
BH CV LOWER VASCULAR RIGHT DISTAL FEMORAL COMPRESS: NORMAL
BH CV LOWER VASCULAR RIGHT GASTRONEMIUS COMPRESS: NORMAL
BH CV LOWER VASCULAR RIGHT GREATER SAPH AK COMPRESS: NORMAL
BH CV LOWER VASCULAR RIGHT GREATER SAPH BK COMPRESS: NORMAL
BH CV LOWER VASCULAR RIGHT LESSER SAPH COMPRESS: NORMAL
BH CV LOWER VASCULAR RIGHT MID FEMORAL AUGMENT: NORMAL
BH CV LOWER VASCULAR RIGHT MID FEMORAL COMPRESS: NORMAL
BH CV LOWER VASCULAR RIGHT MID FEMORAL PHASIC: NORMAL
BH CV LOWER VASCULAR RIGHT MID FEMORAL SPONT: NORMAL
BH CV LOWER VASCULAR RIGHT PERONEAL COMPRESS: NORMAL
BH CV LOWER VASCULAR RIGHT POPLITEAL AUGMENT: NORMAL
BH CV LOWER VASCULAR RIGHT POPLITEAL COMPRESS: NORMAL
BH CV LOWER VASCULAR RIGHT POPLITEAL PHASIC: NORMAL
BH CV LOWER VASCULAR RIGHT POPLITEAL SPONT: NORMAL
BH CV LOWER VASCULAR RIGHT POSTERIOR TIBIAL COMPRESS: NORMAL
BH CV LOWER VASCULAR RIGHT PROXIMAL FEMORAL COMPRESS: NORMAL
BH CV LOWER VASCULAR RIGHT SAPHENOFEMORAL JUNCTION COMPRESS: NORMAL
BUN SERPL-MCNC: 64 MG/DL (ref 8–23)
BUN/CREAT SERPL: 12.1 (ref 7–25)
C-ANCA TITR SER IF: ABNORMAL TITER
CALCIUM SPEC-SCNC: 9 MG/DL (ref 8.6–10.5)
CHLORIDE SERPL-SCNC: 106 MMOL/L (ref 98–107)
CO2 SERPL-SCNC: 24 MMOL/L (ref 22–29)
CREAT SERPL-MCNC: 5.31 MG/DL (ref 0.57–1)
DEPRECATED RDW RBC AUTO: 54.8 FL (ref 37–54)
EGFRCR SERPLBLD CKD-EPI 2021: 8.3 ML/MIN/1.73
ERYTHROCYTE [DISTWIDTH] IN BLOOD BY AUTOMATED COUNT: 15.9 % (ref 12.3–15.4)
GLUCOSE SERPL-MCNC: 115 MG/DL (ref 65–99)
HCT VFR BLD AUTO: 23.9 % (ref 34–46.6)
HGB BLD-MCNC: 7.6 G/DL (ref 12–15.9)
INR PPP: 1.74 (ref 0.89–1.12)
MCH RBC QN AUTO: 30 PG (ref 26.6–33)
MCHC RBC AUTO-ENTMCNC: 31.8 G/DL (ref 31.5–35.7)
MCV RBC AUTO: 94.5 FL (ref 79–97)
MYELOPEROXIDASE AB SER IA-ACNC: <0.2 UNITS (ref 0–0.9)
P-ANCA ATYPICAL TITR SER IF: ABNORMAL TITER
P-ANCA TITR SER IF: ABNORMAL TITER
PLATELET # BLD AUTO: 129 10*3/MM3 (ref 140–450)
PMV BLD AUTO: 10.9 FL (ref 6–12)
POTASSIUM SERPL-SCNC: 4 MMOL/L (ref 3.5–5.2)
PROTEINASE3 AB SER IA-ACNC: <0.2 UNITS (ref 0–0.9)
PROTHROMBIN TIME: 20.5 SECONDS (ref 12.2–14.5)
RBC # BLD AUTO: 2.53 10*6/MM3 (ref 3.77–5.28)
SODIUM SERPL-SCNC: 140 MMOL/L (ref 136–145)
WBC NRBC COR # BLD AUTO: 5.83 10*3/MM3 (ref 3.4–10.8)

## 2024-06-27 PROCEDURE — 99232 SBSQ HOSP IP/OBS MODERATE 35: CPT | Performed by: INTERNAL MEDICINE

## 2024-06-27 PROCEDURE — 80048 BASIC METABOLIC PNL TOTAL CA: CPT | Performed by: INTERNAL MEDICINE

## 2024-06-27 PROCEDURE — 85027 COMPLETE CBC AUTOMATED: CPT | Performed by: INTERNAL MEDICINE

## 2024-06-27 PROCEDURE — 99232 SBSQ HOSP IP/OBS MODERATE 35: CPT | Performed by: PHYSICIAN ASSISTANT

## 2024-06-27 PROCEDURE — 85610 PROTHROMBIN TIME: CPT

## 2024-06-27 RX ORDER — ROSUVASTATIN CALCIUM 10 MG/1
10 TABLET, COATED ORAL NIGHTLY
Status: DISCONTINUED | OUTPATIENT
Start: 2024-06-27 | End: 2024-07-02

## 2024-06-27 RX ADMIN — DIPHENHYDRAMINE HYDROCHLORIDE AND LIDOCAINE HYDROCHLORIDE AND ALUMINUM HYDROXIDE AND MAGNESIUM HYDRO 5 ML: KIT at 14:29

## 2024-06-27 RX ADMIN — PANTOPRAZOLE SODIUM 40 MG: 40 INJECTION, POWDER, FOR SOLUTION INTRAVENOUS at 17:52

## 2024-06-27 RX ADMIN — TERAZOSIN HYDROCHLORIDE 2 MG: 2 CAPSULE ORAL at 21:59

## 2024-06-27 RX ADMIN — Medication 10 ML: at 08:36

## 2024-06-27 RX ADMIN — DIPHENHYDRAMINE HYDROCHLORIDE AND LIDOCAINE HYDROCHLORIDE AND ALUMINUM HYDROXIDE AND MAGNESIUM HYDRO 5 ML: KIT at 06:48

## 2024-06-27 RX ADMIN — PANTOPRAZOLE SODIUM 40 MG: 40 INJECTION, POWDER, FOR SOLUTION INTRAVENOUS at 08:35

## 2024-06-27 RX ADMIN — Medication 10 ML: at 21:59

## 2024-06-27 RX ADMIN — ROSUVASTATIN 10 MG: 10 TABLET, FILM COATED ORAL at 21:59

## 2024-06-27 RX ADMIN — NEBIVOLOL 2.5 MG: 2.5 TABLET ORAL at 08:36

## 2024-06-27 RX ADMIN — DIPHENHYDRAMINE HYDROCHLORIDE AND LIDOCAINE HYDROCHLORIDE AND ALUMINUM HYDROXIDE AND MAGNESIUM HYDRO 5 ML: KIT at 21:59

## 2024-06-27 RX ADMIN — PEG-3350, SODIUM SULFATE, SODIUM CHLORIDE, POTASSIUM CHLORIDE, SODIUM ASCORBATE AND ASCORBIC ACID 1000 ML: KIT at 17:52
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
## 2024-06-27 NOTE — PAYOR COMM NOTE
"Moon Lerma (68 y.o. Female)       Date of Birth   1955    Social Security Number       Address   [address] [place] [address]    Home Phone   [phone]    MRN   [id number]       Hindu   None    Marital Status                               Admission Date   6/19/24    Admission Type   Emergency    Admitting Provider   Viktor Morales MD    Attending Provider   Viktor Morales MD    Department, Room/Bed   [address], S581/1       Discharge Date       Discharge Disposition       Discharge Destination                                 Attending Provider: Viktor Morales MD    Allergies: No Known Allergies    Isolation: None   Infection: None   Code Status: CPR    Ht: 152.4 cm (60\")   Wt: 128 kg (282 lb)    Admission Cmt: None   Principal Problem: Renal failure [N19]                   Active Insurance as of 6/19/2024       Primary Coverage       Payor Plan Insurance Group Employer/Plan Group    MISC [employer] MISC COMMERCIAL [id number]       Coverage Address Coverage Phone Number Coverage Fax Number Effective Dates    [address] [address] [phone]  6/8/2024 - None Entered    [facility] [id number]         Subscriber Name Subscriber Birth Date Member ID       DELIACECEMOON WEBER 1955 [id number]                     Emergency Contacts        (Rel.) Home Phone Work Phone Mobile Phone    SHOLA ONTIVEROS (Daughter) [phone] -- --    MARIO MARRERO (Other) [phone] -- --              [place]: [facility] [id number] Tax ID [id number]  Insurance Information                  MISC [employer]/MISC COMMERCIAL Phone: [phone]    Subscriber: Moon Lerma Subscriber#: [id number]    Group#: [id number] Precert#: --          Current Facility-Administered Medications   Medication Dose Route Frequency Provider Last Rate Last Admin    acetaminophen (TYLENOL) tablet 650 mg  650 mg Oral Q6H PRN Mali Carrillo MD   650 mg at 06/24/24 1242    Biotene dry mouth liquid 15 " mL  15 mL Swish & Spit 5x Daily PRN Mali Carrillo MD        sennosides-docusate (PERICOLACE) 8.6-50 MG per tablet 2 tablet  2 tablet Oral BID PRN Negrita Padilla APRN        And    polyethylene glycol (MIRALAX) packet 17 g  17 g Oral Daily PRN Negrita Padilla APRN        And    bisacodyl (DULCOLAX) EC tablet 5 mg  5 mg Oral Daily PRN Negrita Padilla APRN        And    bisacodyl (DULCOLAX) suppository 10 mg  10 mg Rectal Daily PRN Negrita Padilla APRN        First Mouthwash (Magic Mouthwash) 5 mL  5 mL Swish & Spit Q8H Mali Carrillo MD   5 mL at 06/27/24 1429    lactated ringers infusion  50 mL/hr Intravenous Continuous Reji Deutsch MD 50 mL/hr at 06/27/24 1434 50 mL/hr at 06/27/24 1434    nebivolol (BYSTOLIC) tablet 2.5 mg  2.5 mg Oral Q24H Negrita Padilla APRN   2.5 mg at 06/27/24 0836    oxymetazoline (AFRIN) nasal spray 2 spray  2 spray Each Nare BID PRN Mali Carrillo MD        pantoprazole (PROTONIX) injection 40 mg  40 mg Intravenous BID AC Rosanna Anthony PA   40 mg at 06/27/24 0835    PEG-KCl-NaCl-NaSulf-Na Asc-C (MOVIPREP) powder 1,000 mL  1,000 mL Oral Once When Specified Rosanna Anthony PA        Followed by    [START ON 6/28/2024] PEG-KCl-NaCl-NaSulf-Na Asc-C (MOVIPREP) powder 1,000 mL  1,000 mL Oral Once When Specified Rosanna Anthony PA        sodium chloride 0.9 % flush 10 mL  10 mL Intravenous PRN Emerald Bravo MD        sodium chloride 0.9 % flush 10 mL  10 mL Intravenous Q12H Negrita Padilla APRN   10 mL at 06/27/24 0836    sodium chloride 0.9 % flush 10 mL  10 mL Intravenous PRN Randy, Negrita L, APRN        sodium chloride 0.9 % infusion 40 mL  40 mL Intravenous PRN Negrita Padilla APRN        sodium chloride nasal spray 1 spray  1 spray Each Nare PRN Mali Carrillo MD        terazosin (HYTRIN) capsule 2 mg  2 mg Oral Nightly Negrita Padilla APRN   2 mg at 06/26/24 2222     Lab Results (last 24 hours)       Procedure Component Value Units Date/Time    ANCA Panel  [[id number]]  (Abnormal) Collected: 06/25/24 0725    Specimen: Blood Updated: 06/27/24 1212     ANTI-MPO ANTIBODIES <0.2 units      ANTI-PR3 ANTIBODIES <0.2 units      C-ANCA <1:20 titer      P-ANCA <1:20 titer      Comment: The presence of positive fluorescence exhibiting P-ANCA or C-ANCA  patterns alone is not specific for the diagnosis of Wegener's  Granulomatosis (WG) or microscopic polyangiitis. Decisions about  treatment should not be based solely on ANCA IFA results.  The  International ANCA Group Consensus recommends follow up testing of  positive sera with both IN-3 and MPO-ANCA enzyme immunoassays. As  many as 5% serum samples are positive only by EIA.  Ref. AM J Clin Pathol 1999;111:507-513.        Atypical pANCA 1:80 titer      Comment: The atypical pANCA pattern has been observed in a significant  percentage of patients with ulcerative colitis, primary sclerosing  cholangitis and autoimmune hepatitis.       Narrative:      Performed at:  01 - [facility]  [id number]  : Keny Mcclendon MD, Phone:  [phone]  Performed at:  02 - [facility] [address]  [id number]  : Yung Jasmine PhD, Phone:  [phone]    Basic Metabolic Panel [[id number]]  (Abnormal) Collected: 06/27/24 1008    Specimen: Blood Updated: 06/27/24 1123     Glucose 115 mg/dL      BUN 64 mg/dL      Creatinine 5.31 mg/dL      Sodium 140 mmol/L      Potassium 4.0 mmol/L      Chloride 106 mmol/L      CO2 24.0 mmol/L      Calcium 9.0 mg/dL      BUN/Creatinine Ratio 12.1     Anion Gap 10.0 mmol/L      eGFR 8.3 mL/min/1.73      Comment: <15 Indicative of kidney failure       Narrative:      GFR Normal >60  Chronic Kidney Disease <60  Kidney Failure <15      CBC (No Diff) [[id number]]  (Abnormal) Collected: 06/27/24 1008    Specimen: Blood Updated: 06/27/24 1019     WBC 5.83 10*3/mm3      RBC 2.53 10*6/mm3      Hemoglobin 7.6 g/dL      Hematocrit 23.9 %      MCV  "94.5 fL      MCH 30.0 pg      MCHC 31.8 g/dL      RDW 15.9 %      RDW-SD 54.8 fl      MPV 10.9 fL      Platelets 129 10*3/mm3     Protime-INR [[id number]]  (Abnormal) Collected: 06/27/24 0542    Specimen: Blood Updated: 06/27/24 0611     Protime 20.5 Seconds      INR 1.74    Glomerular Basement Membrane Antibodies [[id number]] Collected: 06/25/24 0431    Specimen: Blood Updated: 06/26/24 2008     Glomerular Basement Membrane Ab <0.2 units     Narrative:      Performed at:  [address] [place] [address]  [id number]  : Keny Mcclendon MD, Phone:  [phone]          Imaging Results (Last 24 Hours)       ** No results found for the last 24 hours. **          Orders (last 24 hrs)        Start     Ordered    06/28/24 0600  Protime-INR  Daily       06/27/24 0748    06/28/24 0600  CBC (No Diff)  Daily       06/27/24 0748    06/28/24 0600  Basic Metabolic Panel  Daily       06/27/24 0748    06/28/24 0400  PEG-KCl-NaCl-NaSulf-Na Asc-C (MOVIPREP) powder 1,000 mL  Once When Specified        Placed in \"Followed by\" Linked Group    06/26/24 1444    06/28/24 0001  NPO Diet NPO Type: Strict NPO  Diet Effective Midnight         06/27/24 1408    06/27/24 1800  PEG-KCl-NaCl-NaSulf-Na Asc-C (MOVIPREP) powder 1,000 mL  Once When Specified        Placed in \"Followed by\" Linked Group    06/26/24 1444    06/27/24 1234  DIET MESSAGE Please send new lunch tray, Clear, veggie tray  Once        Comments: Please send new lunch tray, Clear, veggie tray    06/27/24 1234    06/27/24 1232  Diet: Liquid, Vegetarian; Lacto-Ovo Vegetarian (Allows dairy, eggs); Clear Liquid; Fluid Consistency: Thin (IDDSI 0)  Diet Effective 0500         06/27/24 1232    06/27/24 0748  Basic Metabolic Panel  Once         06/27/24 0748    06/27/24 0748  CBC (No Diff)  Once         06/27/24 0748    06/27/24 0600  Protime-INR  Morning Draw         06/26/24 1227    06/27/24 0500  Diet: Liquid; Clear Liquid; Fluid Consistency: Thin (IDDSI " "0)  Diet Effective 0500,   Status:  Canceled         06/26/24 1441    06/26/24 1200  Dietary Nutrition Supplements Boost Glucose Control; chocolate  Daily With Lunch       06/26/24 0937    06/23/24 1145  oxymetazoline (AFRIN) nasal spray 2 spray  2 Times Daily PRN         06/23/24 1134    06/23/24 1133  sodium chloride nasal spray 1 spray  As Needed         06/23/24 1134    06/23/24 1045  lactated ringers infusion  Continuous         06/23/24 0956    06/21/24 1400  First Mouthwash (Magic Mouthwash) 5 mL  Every 8 Hours Scheduled         06/21/24 1340    06/21/24 1115  Biotene dry mouth liquid 15 mL  5 Times Daily PRN         06/21/24 1115    06/20/24 1730  pantoprazole (PROTONIX) injection 40 mg  2 Times Daily Before Meals         06/20/24 0932    06/20/24 0900  nebivolol (BYSTOLIC) tablet 2.5 mg  Every 24 Hours Scheduled         06/19/24 1817 06/20/24 0800  acetaminophen (TYLENOL) tablet 650 mg  Every 6 Hours PRN         06/20/24 0800    06/20/24 0800  Oral Care  2 Times Daily       06/19/24 1817    06/19/24 2100  terazosin (HYTRIN) capsule 2 mg  Nightly         06/19/24 1817 06/19/24 2100  sodium chloride 0.9 % flush 10 mL  Every 12 Hours Scheduled         06/19/24 1817 06/19/24 2000  Vital Signs  Every 4 Hours       06/19/24 1817 06/19/24 1818  Intake & Output  Every Shift       06/19/24 1817 06/19/24 1817  sodium chloride 0.9 % flush 10 mL  As Needed         06/19/24 1817 06/19/24 1817  sodium chloride 0.9 % infusion 40 mL  As Needed         06/19/24 1817 06/19/24 1817  sennosides-docusate (PERICOLACE) 8.6-50 MG per tablet 2 tablet  2 Times Daily PRN        Placed in \"And\" Linked Group    06/19/24 1817 06/19/24 1817  polyethylene glycol (MIRALAX) packet 17 g  Daily PRN        Placed in \"And\" Linked Group    06/19/24 1817    06/19/24 1817  bisacodyl (DULCOLAX) EC tablet 5 mg  Daily PRN        Placed in \"And\" Linked Group    06/19/24 1817 06/19/24 1817  bisacodyl (DULCOLAX) suppository 10 " "mg  Daily PRN        Placed in \"And\" Linked Group    06/19/24 1817 06/19/24 1319  sodium chloride 0.9 % flush 10 mL  As Needed         06/19/24 1319    Unscheduled  Oxygen Therapy- Nasal Cannula; Titrate 1-6 LPM Per SpO2; 90 - 95%  Continuous PRN       06/19/24 1319    Unscheduled  Up With Assistance  As Needed       06/19/24 1817    Unscheduled  Transfuse RBC, 2 Units Infuse Each Unit Over: 3.5H  Transfusion       06/21/24 0631    Unscheduled  Transfuse RBC, 2 Units Infuse Each Unit Over: 3.5H  Transfusion       06/21/24 0959    --  Fulvestrant (FASLODEX) 250 MG/5ML chemo syringe  Every 30 Days         06/19/24 1907    --  rosuvastatin (CRESTOR) 10 MG tablet  Nightly         06/19/24 1908    Signed and Held  Implement Anesthesia Orders Day of Procedure  Once         Signed and Held    Signed and Held  Obtain Informed Consent  Once         Signed and Held                     Physician Progress Notes (last 24 hours)        Reji Deutsch MD at 06/27/24 1410           LOS: 8 days   Patient Care Team:  Carmen Singh PA-C as PCP - General (Physician Assistant)    Chief Complaint: Acute kidney injury   Anemia  GI bleed.    No CP or SOA , fever, chills, rigors, rash, N/V, Constipation.       Subjective     Doing well. Renal function continues to improve. Appetite improving         History taken from: patient    Objective     Vital Sign Min/Max for last 24 hours  Temp  Min: 97.4 °F (36.3 °C)  Max: 98.7 °F (37.1 °C)   BP  Min: 130/76  Max: 156/81   Pulse  Min: 82  Max: 96   Resp  Min: 16  Max: 18   SpO2  Min: 96 %  Max: 97 %   No data recorded   Weight  Min: 128 kg (282 lb)  Max: 128 kg (282 lb)     Flowsheet Rows      Flowsheet Row First Filed Value   Admission Height 152.4 cm (60\") Documented at 06/19/2024 1315   Admission Weight 128 kg (282 lb 3 oz) Documented at 06/19/2024 1315            No intake/output data recorded.  I/O last 3 completed shifts:  In: 170 [P.O.:170]  Out: -     Vitals:    06/26/24 2000 " "06/27/24 0249 06/27/24 0832 06/27/24 1143   BP: 146/87 156/81 130/76    BP Location: Left arm Left arm Left arm    Patient Position: Sitting Lying Sitting    Pulse: 88 96 82    Resp: 16 17 18 18   Temp: 98.7 °F (37.1 °C) 98.2 °F (36.8 °C) 97.8 °F (36.6 °C) 98 °F (36.7 °C)   TempSrc: Oral Oral Oral Oral   SpO2: 96%  97%    Weight:       Height:             Gen: Alert, NAD   HENT: NC, AT, EOMI   NECK: Supple, no JVD, Trachea midline   LUNGS: CTA bilaterally, non labored respirtation   CVS: S1/S2 audible, RRR, no murmur   Abd: Soft, NT, ND, BS+   Ext: No pedal edema, no cyanosis   CNS: Alert, No focal deficit noted grossly  Psy: Cooperative  Skin: Warm, dry and intact        Results Review:     I reviewed the patient's new clinical results.    WBC WBC   Date Value Ref Range Status   06/27/2024 5.83 3.40 - 10.80 10*3/mm3 Final   06/26/2024 5.71 3.40 - 10.80 10*3/mm3 Final   06/25/2024 5.78 3.40 - 10.80 10*3/mm3 Final      HGB Hemoglobin   Date Value Ref Range Status   06/27/2024 7.6 (L) 12.0 - 15.9 g/dL Final   06/26/2024 7.5 (L) 12.0 - 15.9 g/dL Final   06/25/2024 7.7 (L) 12.0 - 15.9 g/dL Final   06/24/2024 8.0 (L) 12.0 - 15.9 g/dL Final      HCT Hematocrit   Date Value Ref Range Status   06/27/2024 23.9 (L) 34.0 - 46.6 % Final   06/26/2024 23.1 (L) 34.0 - 46.6 % Final   06/25/2024 23.6 (L) 34.0 - 46.6 % Final   06/24/2024 25.0 (L) 34.0 - 46.6 % Final      Platlets No results found for: \"LABPLAT\"   MCV MCV   Date Value Ref Range Status   06/27/2024 94.5 79.0 - 97.0 fL Final   06/26/2024 91.7 79.0 - 97.0 fL Final   06/25/2024 90.4 79.0 - 97.0 fL Final          Sodium Sodium   Date Value Ref Range Status   06/27/2024 140 136 - 145 mmol/L Final   06/26/2024 139 136 - 145 mmol/L Final   06/25/2024 139 136 - 145 mmol/L Final      Potassium Potassium   Date Value Ref Range Status   06/27/2024 4.0 3.5 - 5.2 mmol/L Final   06/26/2024 3.8 3.5 - 5.2 mmol/L Final   06/25/2024 4.0 3.5 - 5.2 mmol/L Final      Chloride Chloride " "  Date Value Ref Range Status   06/27/2024 106 98 - 107 mmol/L Final   06/26/2024 103 98 - 107 mmol/L Final   06/25/2024 104 98 - 107 mmol/L Final      CO2 CO2   Date Value Ref Range Status   06/27/2024 24.0 22.0 - 29.0 mmol/L Final   06/26/2024 20.0 (L) 22.0 - 29.0 mmol/L Final   06/25/2024 20.0 (L) 22.0 - 29.0 mmol/L Final      BUN BUN   Date Value Ref Range Status   06/27/2024 64 (H) 8 - 23 mg/dL Final   06/26/2024 66 (H) 8 - 23 mg/dL Final   06/25/2024 71 (H) 8 - 23 mg/dL Final      Creatinine Creatinine   Date Value Ref Range Status   06/27/2024 5.31 (H) 0.57 - 1.00 mg/dL Final   06/26/2024 4.84 (H) 0.57 - 1.00 mg/dL Final   06/25/2024 5.24 (H) 0.57 - 1.00 mg/dL Final      Calcium Calcium   Date Value Ref Range Status   06/27/2024 9.0 8.6 - 10.5 mg/dL Final   06/26/2024 8.8 8.6 - 10.5 mg/dL Final   06/25/2024 8.4 (L) 8.6 - 10.5 mg/dL Final      PO4 No results found for: \"CAPO4\"   Albumin No results found for: \"ALBUMIN\"     Magnesium Magnesium   Date Value Ref Range Status   06/25/2024 1.8 1.6 - 2.4 mg/dL Final      Uric Acid No results found for: \"URICACID\"     Medication Review: Yes    Assessment & Plan       Renal failure    Acute blood loss anemia    Hyperkalemia    Acute upper GI bleeding    Chronic deep vein thrombosis (DVT)    Primary hypertension    Chronic acquired lymphedema    Chronic cutaneous venous stasis ulcer    Acute GI bleeding    Results from last 7 days   Lab Units 06/27/24  1008 06/26/24  0327 06/25/24  0431 06/24/24  1829 06/24/24  0436   SODIUM mmol/L 140 139 139  --  139   POTASSIUM mmol/L 4.0 3.8 4.0  --  4.0   CHLORIDE mmol/L 106 103 104  --  105   CO2 mmol/L 24.0 20.0* 20.0*  --  22.0   BUN mg/dL 64* 66* 71*  --  80*   CREATININE mg/dL 5.31* 4.84* 5.24*  --  5.42*   CALCIUM mg/dL 9.0 8.8 8.4*  --  8.2*   WBC 10*3/mm3 5.83 5.71 5.78  --  6.48   HEMOGLOBIN g/dL 7.6* 7.5* 7.7* 8.0* 6.6*   PLATELETS 10*3/mm3 129* 130* 132*  --  147     No intake or output data in the 24 hours ending " "06/27/24 1410        Assessment & Plan       Acute kidney injury: Last known stable cr ~ 1.4mg/dl. Cr 6.7 mg/dl on admission. UA large alison esterase+ trace protein blood moderate ( Not a clean catch) UACR - 80.9. Non oliguric - DDx - Pre-quinten/ATN vs AIN - improving.      Hyperkalemia: In the setting of WALTER:      Met acidosis: Due to WALTER    Severe anemia: FOBT+. On AC. GI following. Plan for EGD     DANIEL: Likely due to severe anemia     Hx of breast cancer: On anastrozole     Supra-therapeutic INR: On dabigatran.  Concern for Gi bleed. FOBT positive.      Recs  - Uptrend in serum cr noted. Patient will be getting bowel prep today. Will restart IV fluids NS @ 50cc/hr.  - Monitor I/O   - Avoid nephrotoxic agents.   - Renal diet   - Adjust meds per renal function   - No emergent need of RRT   - Monitor H/H and transfuse for Hgb less than 7.0   - ANCA panel pending. If no improvement, may need renal biopsy       Reji Deutsch MD  06/27/24  14:10 EDT              Electronically signed by Reji Deutsch MD at 06/27/24 1413       Rosanna Anthony PA at 06/27/24 1405       Attestation signed by Brunner, Mark I, MD at 06/27/24 1623    I have reviewed this documentation and agree.  Patient seen and examined with Ms. Anthony.                  GI Daily Progress Note  Subjective:    Chief Complaint:  Follow up melena     Denies any complaints.   States her most recent bowel movement was reddish.      Objective:    /76 (BP Location: Left arm, Patient Position: Sitting)   Pulse 82   Temp 98 °F (36.7 °C) (Oral)   Resp 18   Ht 152.4 cm (60\")   Wt 128 kg (282 lb)   SpO2 97%   BMI 55.07 kg/m²     Physical Exam  Constitutional:       General: She is not in acute distress.     Appearance: She is obese.   Cardiovascular:      Rate and Rhythm: Normal rate and regular rhythm.   Pulmonary:      Effort: Pulmonary effort is normal. No respiratory distress.   Abdominal:      General: Bowel sounds are normal. There is no distension.      " Palpations: Abdomen is soft.      Tenderness: There is no abdominal tenderness.   Neurological:      Mental Status: She is alert and oriented to person, place, and time.         Lab  Lab Results   Component Value Date    WBC 5.83 06/27/2024    HGB 7.6 (L) 06/27/2024    HGB 7.5 (L) 06/26/2024    HGB 7.7 (L) 06/25/2024    MCV 94.5 06/27/2024     (L) 06/27/2024    INR 1.74 (H) 06/27/2024    INR 2.12 (H) 06/26/2024    INR 2.22 (H) 06/25/2024    INR 3.47 (H) 06/24/2024    INR 4.42 (H) 06/23/2024       Lab Results   Component Value Date    GLUCOSE 115 (H) 06/27/2024    BUN 64 (H) 06/27/2024    CREATININE 5.31 (H) 06/27/2024    BCR 12.1 06/27/2024     06/27/2024    K 4.0 06/27/2024    CO2 24.0 06/27/2024    CALCIUM 9.0 06/27/2024    ALBUMIN 3.2 (L) 06/19/2024    ALKPHOS 59 06/19/2024    BILITOT 0.3 06/19/2024    ALT 22 06/19/2024    AST 30 06/19/2024       Assessment:    Gastrointestinal bleeding with melena   Acute blood loss anemia   Supra therapeutic INR in the setting of Pradaxa use with renal failure.  INR >10 on arrival.  Improving, now 2.12.    Recent oral ulceration with bleeding.  Resolved   Epistaxis   Acute renal failure  History of breast cancer, on chronic hormonal therapy.   History of DVT    Plan:    >> Split dose Moviprep to begin today  >> Colonoscopy and EGD tomorrow.  NPO at midnight with exception of bowel cleanse.      BHARAT Monet  06/27/24  14:06 EDT      Electronically signed by Brunner, Mark I, MD at 06/27/24 1623       Consult Notes (last 24 hours)  Notes from 06/26/24 1706 through 06/27/24 1706   No notes of this type exist for this encounter.

## 2024-06-27 NOTE — PLAN OF CARE
Problem: Adult Inpatient Plan of Care  Goal: Plan of Care Review  Outcome: Ongoing, Progressing  Goal: Patient-Specific Goal (Individualized)  Outcome: Ongoing, Progressing  Goal: Absence of Hospital-Acquired Illness or Injury  Outcome: Ongoing, Progressing  Intervention: Identify and Manage Fall Risk  Description: Perform standard risk assessment on admission using a validated tool or comprehensive approach appropriate to the patient; reassess fall risk frequently, with change in status or transfer to another level of care.  Communicate fall injury risk to interprofessional healthcare team.  Determine need for increased observation, equipment and environmental modification, such as low bed, signage and supportive, nonskid footwear.  Adjust safety measures to individual developmental age, stage and identified risk factors.  Reinforce the importance of safety and physical activity with patient and family.  Perform regular intentional rounding to assess need for position change, pain assessment and personal needs, including assistance with toileting.  Recent Flowsheet Documentation  Taken 6/27/2024 1800 by Lena Woo, RN  Safety Promotion/Fall Prevention:   activity supervised   assistive device/personal items within reach   clutter free environment maintained   fall prevention program maintained   nonskid shoes/slippers when out of bed   room organization consistent   safety round/check completed  Taken 6/27/2024 1600 by Lena Woo, RN  Safety Promotion/Fall Prevention:   activity supervised   assistive device/personal items within reach   clutter free environment maintained   fall prevention program maintained   nonskid shoes/slippers when out of bed   room organization consistent   safety round/check completed  Taken 6/27/2024 1400 by Lena Woo, RN  Safety Promotion/Fall Prevention:   activity supervised   assistive device/personal items within reach   clutter free environment maintained   fall  prevention program maintained   nonskid shoes/slippers when out of bed   room organization consistent   safety round/check completed  Taken 6/27/2024 1200 by Lena Woo RN  Safety Promotion/Fall Prevention:   activity supervised   assistive device/personal items within reach   clutter free environment maintained   fall prevention program maintained   nonskid shoes/slippers when out of bed   safety round/check completed   room organization consistent  Taken 6/27/2024 1000 by Lena Woo RN  Safety Promotion/Fall Prevention:   activity supervised   assistive device/personal items within reach   clutter free environment maintained   fall prevention program maintained   nonskid shoes/slippers when out of bed   room organization consistent   safety round/check completed  Taken 6/27/2024 0832 by Lena Woo RN  Safety Promotion/Fall Prevention:   activity supervised   assistive device/personal items within reach   clutter free environment maintained   fall prevention program maintained   nonskid shoes/slippers when out of bed   room organization consistent   safety round/check completed  Intervention: Prevent Skin Injury  Description: Perform a screening for skin injury risk, such as pressure or moisture associated skin damage on admission and at regular intervals throughout hospital stay.  Keep all areas of skin (especially folds) clean and dry.  Maintain adequate skin hydration.  Relieve and redistribute pressure and protect bony prominences; implement measures based on patient-specific risk factors.  Match turning and repositioning schedule to clinical condition.  Encourage weight shift frequently; assist with reposition if unable to complete independently.  Float heels off bed; avoid pressure on the Achilles tendon.  Keep skin free from extended contact with medical devices.  Encourage functional activity and mobility, as early as tolerated.  Use aids (e.g., slide boards, mechanical lift) during  transfer.  Recent Flowsheet Documentation  Taken 6/27/2024 1800 by Lena Woo RN  Body Position:   position changed independently   lower extremity elevated   neutral head position   neutral body alignment  Taken 6/27/2024 1600 by Lena Woo RN  Body Position:   position changed independently   lower extremity elevated   neutral body alignment   neutral head position  Taken 6/27/2024 1400 by Lena Woo RN  Body Position:   position changed independently   lower extremity elevated   neutral body alignment   neutral head position  Taken 6/27/2024 1200 by Lena Woo RN  Body Position:   position changed independently   lower extremity elevated   neutral body alignment   neutral head position  Taken 6/27/2024 1000 by Lena Woo RN  Body Position:   position changed independently   lower extremity elevated   neutral body alignment   neutral head position  Taken 6/27/2024 0832 by Lena Woo RN  Body Position:   position changed independently   lower extremity elevated   neutral body alignment   neutral head position  Skin Protection:   adhesive use limited   skin-to-device areas padded   skin-to-skin areas padded   transparent dressing maintained   tubing/devices free from skin contact   skin sealant/moisture barrier applied  Intervention: Prevent and Manage VTE (Venous Thromboembolism) Risk  Description: Assess for VTE (venous thromboembolism) risk.  Encourage and assist with early ambulation.  Initiate and maintain compression or other therapy, as indicated, based on identified risk in accordance with organizational protocol and provider order.  Encourage both active and passive leg exercises while in bed, if unable to ambulate.  Recent Flowsheet Documentation  Taken 6/27/2024 1800 by Lena Woo RN  Activity Management: activity encouraged  Taken 6/27/2024 1600 by Lena Woo RN  Activity Management: activity encouraged  Taken 6/27/2024 1400 by Lena Woo RN  Activity  Management: activity encouraged  Taken 6/27/2024 1200 by Lena Woo RN  Activity Management: activity encouraged  Taken 6/27/2024 1000 by Lena Woo RN  Activity Management: activity encouraged  Taken 6/27/2024 0832 by Lena Woo RN  Activity Management: activity encouraged  VTE Prevention/Management: (ambulates) other (see comments)  Range of Motion: active ROM (range of motion) encouraged  Intervention: Prevent Infection  Description: Maintain skin and mucous membrane integrity; promote hand, oral and pulmonary hygiene.  Optimize fluid balance, nutrition, sleep and glycemic control to maximize infection resistance.  Identify potential sources of infection early to prevent or mitigate progression of infection (e.g., wound, lines, devices).  Evaluate ongoing need for invasive devices; remove promptly when no longer indicated.  Recent Flowsheet Documentation  Taken 6/27/2024 1800 by Lena Woo RN  Infection Prevention:   environmental surveillance performed   equipment surfaces disinfected   hand hygiene promoted   personal protective equipment utilized   rest/sleep promoted   single patient room provided  Taken 6/27/2024 1600 by Lena Woo RN  Infection Prevention:   environmental surveillance performed   equipment surfaces disinfected   hand hygiene promoted   personal protective equipment utilized   rest/sleep promoted   single patient room provided  Taken 6/27/2024 1400 by Lena Woo RN  Infection Prevention:   environmental surveillance performed   equipment surfaces disinfected   hand hygiene promoted   personal protective equipment utilized   rest/sleep promoted   single patient room provided  Taken 6/27/2024 1200 by Lena Woo RN  Infection Prevention:   environmental surveillance performed   equipment surfaces disinfected   hand hygiene promoted   personal protective equipment utilized   rest/sleep promoted   single patient room provided  Taken 6/27/2024 1000 by  Lena Woo RN  Infection Prevention:   environmental surveillance performed   equipment surfaces disinfected   hand hygiene promoted   personal protective equipment utilized   rest/sleep promoted   single patient room provided  Taken 6/27/2024 0832 by Lena Woo RN  Infection Prevention:   environmental surveillance performed   equipment surfaces disinfected   hand hygiene promoted   personal protective equipment utilized   rest/sleep promoted   single patient room provided  Goal: Optimal Comfort and Wellbeing  Outcome: Ongoing, Progressing  Intervention: Monitor Pain and Promote Comfort  Description: Assess pain level, treatment efficacy and patient response at regular intervals using a consistent pain scale.  Consider the presence and impact of preexisting chronic pain.  Encourage patient and caregiver involvement in pain assessment, interventions and safety measures.  Recent Flowsheet Documentation  Taken 6/27/2024 1000 by Lena Woo RN  Pain Management Interventions:   pillow support provided   position adjusted   quiet environment facilitated  Taken 6/27/2024 0832 by Lena Woo RN  Pain Management Interventions:   quiet environment facilitated   pillow support provided   position adjusted  Intervention: Provide Person-Centered Care  Description: Use a family-focused approach to care.  Develop trust and rapport by proactively providing information, encouraging questions, addressing concerns and offering reassurance.  Acknowledge emotional response to hospitalization.  Recognize and utilize personal coping strategies.  Honor spiritual and cultural preferences.  Recent Flowsheet Documentation  Taken 6/27/2024 0832 by Lena Woo RN  Trust Relationship/Rapport:   care explained   choices provided   questions answered   questions encouraged   thoughts/feelings acknowledged  Goal: Readiness for Transition of Care  Outcome: Ongoing, Progressing   Goal Outcome Evaluation:

## 2024-06-27 NOTE — PROGRESS NOTES
"GI Daily Progress Note  Subjective:    Chief Complaint:  Follow up melena     Denies any complaints.   States her most recent bowel movement was reddish.      Objective:    /76 (BP Location: Left arm, Patient Position: Sitting)   Pulse 82   Temp 98 °F (36.7 °C) (Oral)   Resp 18   Ht 152.4 cm (60\")   Wt 128 kg (282 lb)   SpO2 97%   BMI 55.07 kg/m²     Physical Exam  Constitutional:       General: She is not in acute distress.     Appearance: She is obese.   Cardiovascular:      Rate and Rhythm: Normal rate and regular rhythm.   Pulmonary:      Effort: Pulmonary effort is normal. No respiratory distress.   Abdominal:      General: Bowel sounds are normal. There is no distension.      Palpations: Abdomen is soft.      Tenderness: There is no abdominal tenderness.   Neurological:      Mental Status: She is alert and oriented to person, place, and time.         Lab  Lab Results   Component Value Date    WBC 5.83 06/27/2024    HGB 7.6 (L) 06/27/2024    HGB 7.5 (L) 06/26/2024    HGB 7.7 (L) 06/25/2024    MCV 94.5 06/27/2024     (L) 06/27/2024    INR 1.74 (H) 06/27/2024    INR 2.12 (H) 06/26/2024    INR 2.22 (H) 06/25/2024    INR 3.47 (H) 06/24/2024    INR 4.42 (H) 06/23/2024       Lab Results   Component Value Date    GLUCOSE 115 (H) 06/27/2024    BUN 64 (H) 06/27/2024    CREATININE 5.31 (H) 06/27/2024    BCR 12.1 06/27/2024     06/27/2024    K 4.0 06/27/2024    CO2 24.0 06/27/2024    CALCIUM 9.0 06/27/2024    ALBUMIN 3.2 (L) 06/19/2024    ALKPHOS 59 06/19/2024    BILITOT 0.3 06/19/2024    ALT 22 06/19/2024    AST 30 06/19/2024       Assessment:    Gastrointestinal bleeding with melena   Acute blood loss anemia   Supra therapeutic INR in the setting of Pradaxa use with renal failure.  INR >10 on arrival.  Improving, now 2.12.    Recent oral ulceration with bleeding.  Resolved   Epistaxis   Acute renal failure  History of breast cancer, on chronic hormonal therapy.   History of DVT    Plan:    >> " Split dose Moviprep to begin today  >> Colonoscopy and EGD tomorrow.  NPO at midnight with exception of bowel cleanse.      BHARAT Monet  06/27/24  14:06 EDT

## 2024-06-27 NOTE — PROGRESS NOTES
" LOS: 8 days   Patient Care Team:  Carmen Singh PA-C as PCP - General (Physician Assistant)    Chief Complaint: Acute kidney injury   Anemia  GI bleed.    No CP or SOA , fever, chills, rigors, rash, N/V, Constipation.       Subjective     Doing well. Renal function continues to improve. Appetite improving         History taken from: patient    Objective     Vital Sign Min/Max for last 24 hours  Temp  Min: 97.4 °F (36.3 °C)  Max: 98.7 °F (37.1 °C)   BP  Min: 130/76  Max: 156/81   Pulse  Min: 82  Max: 96   Resp  Min: 16  Max: 18   SpO2  Min: 96 %  Max: 97 %   No data recorded   Weight  Min: 128 kg (282 lb)  Max: 128 kg (282 lb)     Flowsheet Rows      Flowsheet Row First Filed Value   Admission Height 152.4 cm (60\") Documented at 06/19/2024 1315   Admission Weight 128 kg (282 lb 3 oz) Documented at 06/19/2024 1315            No intake/output data recorded.  I/O last 3 completed shifts:  In: 170 [P.O.:170]  Out: -     Vitals:    06/26/24 2000 06/27/24 0249 06/27/24 0832 06/27/24 1143   BP: 146/87 156/81 130/76    BP Location: Left arm Left arm Left arm    Patient Position: Sitting Lying Sitting    Pulse: 88 96 82    Resp: 16 17 18 18   Temp: 98.7 °F (37.1 °C) 98.2 °F (36.8 °C) 97.8 °F (36.6 °C) 98 °F (36.7 °C)   TempSrc: Oral Oral Oral Oral   SpO2: 96%  97%    Weight:       Height:             Gen: Alert, NAD   HENT: NC, AT, EOMI   NECK: Supple, no JVD, Trachea midline   LUNGS: CTA bilaterally, non labored respirtation   CVS: S1/S2 audible, RRR, no murmur   Abd: Soft, NT, ND, BS+   Ext: No pedal edema, no cyanosis   CNS: Alert, No focal deficit noted grossly  Psy: Cooperative  Skin: Warm, dry and intact        Results Review:     I reviewed the patient's new clinical results.    WBC WBC   Date Value Ref Range Status   06/27/2024 5.83 3.40 - 10.80 10*3/mm3 Final   06/26/2024 5.71 3.40 - 10.80 10*3/mm3 Final   06/25/2024 5.78 3.40 - 10.80 10*3/mm3 Final      HGB Hemoglobin   Date Value Ref Range Status " "  06/27/2024 7.6 (L) 12.0 - 15.9 g/dL Final   06/26/2024 7.5 (L) 12.0 - 15.9 g/dL Final   06/25/2024 7.7 (L) 12.0 - 15.9 g/dL Final   06/24/2024 8.0 (L) 12.0 - 15.9 g/dL Final      HCT Hematocrit   Date Value Ref Range Status   06/27/2024 23.9 (L) 34.0 - 46.6 % Final   06/26/2024 23.1 (L) 34.0 - 46.6 % Final   06/25/2024 23.6 (L) 34.0 - 46.6 % Final   06/24/2024 25.0 (L) 34.0 - 46.6 % Final      Platlets No results found for: \"LABPLAT\"   MCV MCV   Date Value Ref Range Status   06/27/2024 94.5 79.0 - 97.0 fL Final   06/26/2024 91.7 79.0 - 97.0 fL Final   06/25/2024 90.4 79.0 - 97.0 fL Final          Sodium Sodium   Date Value Ref Range Status   06/27/2024 140 136 - 145 mmol/L Final   06/26/2024 139 136 - 145 mmol/L Final   06/25/2024 139 136 - 145 mmol/L Final      Potassium Potassium   Date Value Ref Range Status   06/27/2024 4.0 3.5 - 5.2 mmol/L Final   06/26/2024 3.8 3.5 - 5.2 mmol/L Final   06/25/2024 4.0 3.5 - 5.2 mmol/L Final      Chloride Chloride   Date Value Ref Range Status   06/27/2024 106 98 - 107 mmol/L Final   06/26/2024 103 98 - 107 mmol/L Final   06/25/2024 104 98 - 107 mmol/L Final      CO2 CO2   Date Value Ref Range Status   06/27/2024 24.0 22.0 - 29.0 mmol/L Final   06/26/2024 20.0 (L) 22.0 - 29.0 mmol/L Final   06/25/2024 20.0 (L) 22.0 - 29.0 mmol/L Final      BUN BUN   Date Value Ref Range Status   06/27/2024 64 (H) 8 - 23 mg/dL Final   06/26/2024 66 (H) 8 - 23 mg/dL Final   06/25/2024 71 (H) 8 - 23 mg/dL Final      Creatinine Creatinine   Date Value Ref Range Status   06/27/2024 5.31 (H) 0.57 - 1.00 mg/dL Final   06/26/2024 4.84 (H) 0.57 - 1.00 mg/dL Final   06/25/2024 5.24 (H) 0.57 - 1.00 mg/dL Final      Calcium Calcium   Date Value Ref Range Status   06/27/2024 9.0 8.6 - 10.5 mg/dL Final   06/26/2024 8.8 8.6 - 10.5 mg/dL Final   06/25/2024 8.4 (L) 8.6 - 10.5 mg/dL Final      PO4 No results found for: \"CAPO4\"   Albumin No results found for: \"ALBUMIN\"     Magnesium Magnesium   Date Value Ref " "Range Status   06/25/2024 1.8 1.6 - 2.4 mg/dL Final      Uric Acid No results found for: \"URICACID\"     Medication Review: Yes    Assessment & Plan       Renal failure    Acute blood loss anemia    Hyperkalemia    Acute upper GI bleeding    Chronic deep vein thrombosis (DVT)    Primary hypertension    Chronic acquired lymphedema    Chronic cutaneous venous stasis ulcer    Acute GI bleeding    Results from last 7 days   Lab Units 06/27/24  1008 06/26/24  0327 06/25/24  0431 06/24/24  1829 06/24/24  0436   SODIUM mmol/L 140 139 139  --  139   POTASSIUM mmol/L 4.0 3.8 4.0  --  4.0   CHLORIDE mmol/L 106 103 104  --  105   CO2 mmol/L 24.0 20.0* 20.0*  --  22.0   BUN mg/dL 64* 66* 71*  --  80*   CREATININE mg/dL 5.31* 4.84* 5.24*  --  5.42*   CALCIUM mg/dL 9.0 8.8 8.4*  --  8.2*   WBC 10*3/mm3 5.83 5.71 5.78  --  6.48   HEMOGLOBIN g/dL 7.6* 7.5* 7.7* 8.0* 6.6*   PLATELETS 10*3/mm3 129* 130* 132*  --  147     No intake or output data in the 24 hours ending 06/27/24 1410        Assessment & Plan       Acute kidney injury: Last known stable cr ~ 1.4mg/dl. Cr 6.7 mg/dl on admission. UA large alison esterase+ trace protein blood moderate ( Not a clean catch) UACR - 80.9. Non oliguric - DDx - Pre-quinten/ATN vs AIN - improving.      Hyperkalemia: In the setting of WALTER:      Met acidosis: Due to WALTER    Severe anemia: FOBT+. On AC. GI following. Plan for EGD     DANIEL: Likely due to severe anemia     Hx of breast cancer: On anastrozole     Supra-therapeutic INR: On dabigatran.  Concern for Gi bleed. FOBT positive.      Recs  - Uptrend in serum cr noted. Patient will be getting bowel prep today. Will restart IV fluids NS @ 50cc/hr.  - Monitor I/O   - Avoid nephrotoxic agents.   - Renal diet   - Adjust meds per renal function   - No emergent need of RRT   - Monitor H/H and transfuse for Hgb less than 7.0   - ANCA panel pending. If no improvement, may need renal biopsy       Reji Deutsch MD  06/27/24  14:10 EDT            "

## 2024-06-28 ENCOUNTER — ANESTHESIA (OUTPATIENT)
Dept: GASTROENTEROLOGY | Facility: HOSPITAL | Age: 69
End: 2024-06-28
Payer: COMMERCIAL

## 2024-06-28 ENCOUNTER — ANESTHESIA EVENT (OUTPATIENT)
Dept: GASTROENTEROLOGY | Facility: HOSPITAL | Age: 69
End: 2024-06-28
Payer: COMMERCIAL

## 2024-06-28 LAB
ABO GROUP BLD: NORMAL
ANION GAP SERPL CALCULATED.3IONS-SCNC: 15 MMOL/L (ref 5–15)
BLD GP AB SCN SERPL QL: NEGATIVE
BUN SERPL-MCNC: 61 MG/DL (ref 8–23)
BUN/CREAT SERPL: 11.9 (ref 7–25)
CALCIUM SPEC-SCNC: 9.1 MG/DL (ref 8.6–10.5)
CHLORIDE SERPL-SCNC: 104 MMOL/L (ref 98–107)
CO2 SERPL-SCNC: 20 MMOL/L (ref 22–29)
CREAT SERPL-MCNC: 5.12 MG/DL (ref 0.57–1)
DEPRECATED RDW RBC AUTO: 53.6 FL (ref 37–54)
EGFRCR SERPLBLD CKD-EPI 2021: 8.7 ML/MIN/1.73
ERYTHROCYTE [DISTWIDTH] IN BLOOD BY AUTOMATED COUNT: 15.5 % (ref 12.3–15.4)
FOLATE SERPL-MCNC: 4.76 NG/ML (ref 4.78–24.2)
GLUCOSE SERPL-MCNC: 105 MG/DL (ref 65–99)
HCT VFR BLD AUTO: 24.2 % (ref 34–46.6)
HGB BLD-MCNC: 7.7 G/DL (ref 12–15.9)
INR PPP: 1.61 (ref 0.89–1.12)
IRON 24H UR-MRATE: 67 MCG/DL (ref 37–145)
IRON SATN MFR SERPL: 28 % (ref 20–50)
MCH RBC QN AUTO: 29.6 PG (ref 26.6–33)
MCHC RBC AUTO-ENTMCNC: 31.8 G/DL (ref 31.5–35.7)
MCV RBC AUTO: 93.1 FL (ref 79–97)
PLATELET # BLD AUTO: 146 10*3/MM3 (ref 140–450)
PMV BLD AUTO: 11.3 FL (ref 6–12)
POTASSIUM SERPL-SCNC: 3.7 MMOL/L (ref 3.5–5.2)
PROTHROMBIN TIME: 19.3 SECONDS (ref 12.2–14.5)
RBC # BLD AUTO: 2.6 10*6/MM3 (ref 3.77–5.28)
RETICS # AUTO: 0.04 10*6/MM3 (ref 0.02–0.13)
RETICS/RBC NFR AUTO: 1.44 % (ref 0.7–1.9)
RH BLD: POSITIVE
SODIUM SERPL-SCNC: 139 MMOL/L (ref 136–145)
T&S EXPIRATION DATE: NORMAL
TIBC SERPL-MCNC: 241 MCG/DL (ref 298–536)
TRANSFERRIN SERPL-MCNC: 162 MG/DL (ref 200–360)
VIT B12 BLD-MCNC: 922 PG/ML (ref 211–946)
WBC NRBC COR # BLD AUTO: 5.9 10*3/MM3 (ref 3.4–10.8)

## 2024-06-28 PROCEDURE — 0DJD8ZZ INSPECTION OF LOWER INTESTINAL TRACT, VIA NATURAL OR ARTIFICIAL OPENING ENDOSCOPIC: ICD-10-PCS | Performed by: INTERNAL MEDICINE

## 2024-06-28 PROCEDURE — 25010000002 PROPOFOL 10 MG/ML EMULSION: Performed by: NURSE ANESTHETIST, CERTIFIED REGISTERED

## 2024-06-28 PROCEDURE — 25010000002 HEPARIN (PORCINE) PER 1000 UNITS: Performed by: INTERNAL MEDICINE

## 2024-06-28 PROCEDURE — 85027 COMPLETE CBC AUTOMATED: CPT | Performed by: INTERNAL MEDICINE

## 2024-06-28 PROCEDURE — 85045 AUTOMATED RETICULOCYTE COUNT: CPT | Performed by: INTERNAL MEDICINE

## 2024-06-28 PROCEDURE — 88342 IMHCHEM/IMCYTCHM 1ST ANTB: CPT | Performed by: INTERNAL MEDICINE

## 2024-06-28 PROCEDURE — 82746 ASSAY OF FOLIC ACID SERUM: CPT | Performed by: INTERNAL MEDICINE

## 2024-06-28 PROCEDURE — 25810000003 LACTATED RINGERS PER 1000 ML: Performed by: NURSE ANESTHETIST, CERTIFIED REGISTERED

## 2024-06-28 PROCEDURE — 82607 VITAMIN B-12: CPT | Performed by: INTERNAL MEDICINE

## 2024-06-28 PROCEDURE — 86850 RBC ANTIBODY SCREEN: CPT | Performed by: INTERNAL MEDICINE

## 2024-06-28 PROCEDURE — 25010000002 LABETALOL 5 MG/ML SOLUTION: Performed by: NURSE ANESTHETIST, CERTIFIED REGISTERED

## 2024-06-28 PROCEDURE — 88305 TISSUE EXAM BY PATHOLOGIST: CPT | Performed by: INTERNAL MEDICINE

## 2024-06-28 PROCEDURE — 0DB68ZX EXCISION OF STOMACH, VIA NATURAL OR ARTIFICIAL OPENING ENDOSCOPIC, DIAGNOSTIC: ICD-10-PCS | Performed by: INTERNAL MEDICINE

## 2024-06-28 PROCEDURE — 99232 SBSQ HOSP IP/OBS MODERATE 35: CPT | Performed by: INTERNAL MEDICINE

## 2024-06-28 PROCEDURE — 86900 BLOOD TYPING SEROLOGIC ABO: CPT | Performed by: INTERNAL MEDICINE

## 2024-06-28 PROCEDURE — 84466 ASSAY OF TRANSFERRIN: CPT | Performed by: INTERNAL MEDICINE

## 2024-06-28 PROCEDURE — 86901 BLOOD TYPING SEROLOGIC RH(D): CPT | Performed by: INTERNAL MEDICINE

## 2024-06-28 PROCEDURE — 83540 ASSAY OF IRON: CPT | Performed by: INTERNAL MEDICINE

## 2024-06-28 PROCEDURE — 45378 DIAGNOSTIC COLONOSCOPY: CPT | Performed by: INTERNAL MEDICINE

## 2024-06-28 PROCEDURE — 85610 PROTHROMBIN TIME: CPT | Performed by: INTERNAL MEDICINE

## 2024-06-28 PROCEDURE — 43239 EGD BIOPSY SINGLE/MULTIPLE: CPT | Performed by: INTERNAL MEDICINE

## 2024-06-28 PROCEDURE — 80048 BASIC METABOLIC PNL TOTAL CA: CPT | Performed by: INTERNAL MEDICINE

## 2024-06-28 RX ORDER — LIDOCAINE HYDROCHLORIDE 10 MG/ML
INJECTION, SOLUTION EPIDURAL; INFILTRATION; INTRACAUDAL; PERINEURAL AS NEEDED
Status: DISCONTINUED | OUTPATIENT
Start: 2024-06-28 | End: 2024-06-28 | Stop reason: SURG

## 2024-06-28 RX ORDER — PROPOFOL 10 MG/ML
VIAL (ML) INTRAVENOUS AS NEEDED
Status: DISCONTINUED | OUTPATIENT
Start: 2024-06-28 | End: 2024-06-28 | Stop reason: SURG

## 2024-06-28 RX ORDER — LABETALOL HYDROCHLORIDE 5 MG/ML
5 INJECTION, SOLUTION INTRAVENOUS
Status: DISCONTINUED | OUTPATIENT
Start: 2024-06-28 | End: 2024-06-28 | Stop reason: HOSPADM

## 2024-06-28 RX ORDER — FOLIC ACID 1 MG/1
1 TABLET ORAL DAILY
Status: DISCONTINUED | OUTPATIENT
Start: 2024-06-28 | End: 2024-07-03 | Stop reason: HOSPADM

## 2024-06-28 RX ORDER — HEPARIN SODIUM 5000 [USP'U]/ML
5000 INJECTION, SOLUTION INTRAVENOUS; SUBCUTANEOUS EVERY 8 HOURS SCHEDULED
Status: DISCONTINUED | OUTPATIENT
Start: 2024-06-28 | End: 2024-07-01

## 2024-06-28 RX ORDER — SODIUM CHLORIDE, SODIUM LACTATE, POTASSIUM CHLORIDE, CALCIUM CHLORIDE 600; 310; 30; 20 MG/100ML; MG/100ML; MG/100ML; MG/100ML
INJECTION, SOLUTION INTRAVENOUS CONTINUOUS PRN
Status: DISCONTINUED | OUTPATIENT
Start: 2024-06-28 | End: 2024-06-28 | Stop reason: SURG

## 2024-06-28 RX ORDER — IPRATROPIUM BROMIDE AND ALBUTEROL SULFATE 2.5; .5 MG/3ML; MG/3ML
3 SOLUTION RESPIRATORY (INHALATION) ONCE AS NEEDED
Status: DISCONTINUED | OUTPATIENT
Start: 2024-06-28 | End: 2024-06-28 | Stop reason: HOSPADM

## 2024-06-28 RX ORDER — PANTOPRAZOLE SODIUM 40 MG/10ML
40 INJECTION, POWDER, LYOPHILIZED, FOR SOLUTION INTRAVENOUS
Status: DISCONTINUED | OUTPATIENT
Start: 2024-06-29 | End: 2024-07-02

## 2024-06-28 RX ORDER — ANASTROZOLE 1 MG/1
1 TABLET ORAL DAILY
Status: DISCONTINUED | OUTPATIENT
Start: 2024-06-29 | End: 2024-07-03 | Stop reason: HOSPADM

## 2024-06-28 RX ORDER — ONDANSETRON 2 MG/ML
4 INJECTION INTRAMUSCULAR; INTRAVENOUS ONCE AS NEEDED
Status: DISCONTINUED | OUTPATIENT
Start: 2024-06-28 | End: 2024-06-28 | Stop reason: HOSPADM

## 2024-06-28 RX ADMIN — LIDOCAINE HYDROCHLORIDE 100 MG: 10 INJECTION, SOLUTION EPIDURAL; INFILTRATION; INTRACAUDAL; PERINEURAL at 09:08

## 2024-06-28 RX ADMIN — SODIUM CHLORIDE, POTASSIUM CHLORIDE, SODIUM LACTATE AND CALCIUM CHLORIDE: 600; 310; 30; 20 INJECTION, SOLUTION INTRAVENOUS at 09:02

## 2024-06-28 RX ADMIN — ROSUVASTATIN 10 MG: 10 TABLET, FILM COATED ORAL at 21:33

## 2024-06-28 RX ADMIN — NEBIVOLOL 2.5 MG: 2.5 TABLET ORAL at 08:01

## 2024-06-28 RX ADMIN — HEPARIN SODIUM 5000 UNITS: 5000 INJECTION INTRAVENOUS; SUBCUTANEOUS at 21:33

## 2024-06-28 RX ADMIN — FOLIC ACID 1 MG: 1 TABLET ORAL at 21:33

## 2024-06-28 RX ADMIN — DIPHENHYDRAMINE HYDROCHLORIDE AND LIDOCAINE HYDROCHLORIDE AND ALUMINUM HYDROXIDE AND MAGNESIUM HYDRO 5 ML: KIT at 21:33

## 2024-06-28 RX ADMIN — PEG-3350, SODIUM SULFATE, SODIUM CHLORIDE, POTASSIUM CHLORIDE, SODIUM ASCORBATE AND ASCORBIC ACID 1000 ML: KIT at 04:05

## 2024-06-28 RX ADMIN — Medication 10 ML: at 08:03

## 2024-06-28 RX ADMIN — DIPHENHYDRAMINE HYDROCHLORIDE AND LIDOCAINE HYDROCHLORIDE AND ALUMINUM HYDROXIDE AND MAGNESIUM HYDRO 5 ML: KIT at 17:18

## 2024-06-28 RX ADMIN — PROPOFOL 100 MCG/KG/MIN: 10 INJECTION, EMULSION INTRAVENOUS at 09:09

## 2024-06-28 RX ADMIN — TERAZOSIN HYDROCHLORIDE 2 MG: 2 CAPSULE ORAL at 21:33

## 2024-06-28 RX ADMIN — DIPHENHYDRAMINE HYDROCHLORIDE AND LIDOCAINE HYDROCHLORIDE AND ALUMINUM HYDROXIDE AND MAGNESIUM HYDRO 5 ML: KIT at 06:41

## 2024-06-28 RX ADMIN — LABETALOL HYDROCHLORIDE 5 MG: 5 INJECTION, SOLUTION INTRAVENOUS at 09:55

## 2024-06-28 RX ADMIN — PANTOPRAZOLE SODIUM 40 MG: 40 INJECTION, POWDER, FOR SOLUTION INTRAVENOUS at 08:01

## 2024-06-28 RX ADMIN — PANTOPRAZOLE SODIUM 40 MG: 40 INJECTION, POWDER, FOR SOLUTION INTRAVENOUS at 17:18

## 2024-06-28 RX ADMIN — PROPOFOL 50 MG: 10 INJECTION, EMULSION INTRAVENOUS at 09:08

## 2024-06-28 NOTE — PROGRESS NOTES
" LOS: 9 days   Patient Care Team:  Carmen Singh PA-C as PCP - General (Physician Assistant)    Chief Complaint: Acute kidney injury   Anemia  GI bleed.    No CP or SOA , fever, chills, rigors, rash, N/V, Constipation.       Subjective     Doing well. Renal function continues to improve. Appetite improving. Underwent EGD and colonoscopy. Mild gastritis noted.         History taken from: patient    Objective     Vital Sign Min/Max for last 24 hours  Temp  Min: 97.2 °F (36.2 °C)  Max: 98.5 °F (36.9 °C)   BP  Min: 134/86  Max: 211/111   Pulse  Min: 77  Max: 88   Resp  Min: 18  Max: 20   SpO2  Min: 94 %  Max: 100 %   Flow (L/min)  Min: 4  Max: 4   No data recorded     Flowsheet Rows      Flowsheet Row First Filed Value   Admission Height 152.4 cm (60\") Documented at 06/19/2024 1315   Admission Weight 128 kg (282 lb 3 oz) Documented at 06/19/2024 1315            No intake/output data recorded.  I/O last 3 completed shifts:  In: 240 [P.O.:240]  Out: -     Vitals:    06/28/24 0945 06/28/24 0955 06/28/24 1005 06/28/24 1703   BP: (!) 207/112 (!) 211/111 145/66    BP Location:       Patient Position:       Pulse: 87 87     Resp:   20 18   Temp:    97.7 °F (36.5 °C)   TempSrc:    Oral   SpO2: 100%      Weight:       Height:             Gen: Alert, NAD   HENT: NC, AT, EOMI   NECK: Supple, no JVD, Trachea midline   LUNGS: CTA bilaterally, non labored respirtation   CVS: S1/S2 audible, RRR, no murmur   Abd: Soft, NT, ND, BS+   Ext: No pedal edema, no cyanosis   CNS: Alert, No focal deficit noted grossly  Psy: Cooperative  Skin: Warm, dry and intact        Results Review:     I reviewed the patient's new clinical results.    WBC WBC   Date Value Ref Range Status   06/28/2024 5.90 3.40 - 10.80 10*3/mm3 Final   06/27/2024 5.83 3.40 - 10.80 10*3/mm3 Final   06/26/2024 5.71 3.40 - 10.80 10*3/mm3 Final      HGB Hemoglobin   Date Value Ref Range Status   06/28/2024 7.7 (L) 12.0 - 15.9 g/dL Final   06/27/2024 7.6 (L) 12.0 - " "15.9 g/dL Final   06/26/2024 7.5 (L) 12.0 - 15.9 g/dL Final      HCT Hematocrit   Date Value Ref Range Status   06/28/2024 24.2 (L) 34.0 - 46.6 % Final   06/27/2024 23.9 (L) 34.0 - 46.6 % Final   06/26/2024 23.1 (L) 34.0 - 46.6 % Final      Platlets No results found for: \"LABPLAT\"   MCV MCV   Date Value Ref Range Status   06/28/2024 93.1 79.0 - 97.0 fL Final   06/27/2024 94.5 79.0 - 97.0 fL Final   06/26/2024 91.7 79.0 - 97.0 fL Final          Sodium Sodium   Date Value Ref Range Status   06/28/2024 139 136 - 145 mmol/L Final   06/27/2024 140 136 - 145 mmol/L Final   06/26/2024 139 136 - 145 mmol/L Final      Potassium Potassium   Date Value Ref Range Status   06/28/2024 3.7 3.5 - 5.2 mmol/L Final   06/27/2024 4.0 3.5 - 5.2 mmol/L Final   06/26/2024 3.8 3.5 - 5.2 mmol/L Final      Chloride Chloride   Date Value Ref Range Status   06/28/2024 104 98 - 107 mmol/L Final   06/27/2024 106 98 - 107 mmol/L Final   06/26/2024 103 98 - 107 mmol/L Final      CO2 CO2   Date Value Ref Range Status   06/28/2024 20.0 (L) 22.0 - 29.0 mmol/L Final   06/27/2024 24.0 22.0 - 29.0 mmol/L Final   06/26/2024 20.0 (L) 22.0 - 29.0 mmol/L Final      BUN BUN   Date Value Ref Range Status   06/28/2024 61 (H) 8 - 23 mg/dL Final   06/27/2024 64 (H) 8 - 23 mg/dL Final   06/26/2024 66 (H) 8 - 23 mg/dL Final      Creatinine Creatinine   Date Value Ref Range Status   06/28/2024 5.12 (H) 0.57 - 1.00 mg/dL Final   06/27/2024 5.31 (H) 0.57 - 1.00 mg/dL Final   06/26/2024 4.84 (H) 0.57 - 1.00 mg/dL Final      Calcium Calcium   Date Value Ref Range Status   06/28/2024 9.1 8.6 - 10.5 mg/dL Final   06/27/2024 9.0 8.6 - 10.5 mg/dL Final   06/26/2024 8.8 8.6 - 10.5 mg/dL Final      PO4 No results found for: \"CAPO4\"   Albumin No results found for: \"ALBUMIN\"     Magnesium No results found for: \"MG\"     Uric Acid No results found for: \"URICACID\"     Medication Review: Yes    Assessment & Plan       Renal failure    Acute blood loss anemia    Hyperkalemia    " Acute upper GI bleeding    Chronic deep vein thrombosis (DVT)    Primary hypertension    Chronic acquired lymphedema    Chronic cutaneous venous stasis ulcer    Acute GI bleeding    Results from last 7 days   Lab Units 06/28/24  0740 06/27/24  1008 06/26/24  0327 06/25/24  0431   SODIUM mmol/L 139 140 139 139   POTASSIUM mmol/L 3.7 4.0 3.8 4.0   CHLORIDE mmol/L 104 106 103 104   CO2 mmol/L 20.0* 24.0 20.0* 20.0*   BUN mg/dL 61* 64* 66* 71*   CREATININE mg/dL 5.12* 5.31* 4.84* 5.24*   CALCIUM mg/dL 9.1 9.0 8.8 8.4*   WBC 10*3/mm3 5.90 5.83 5.71 5.78   HEMOGLOBIN g/dL 7.7* 7.6* 7.5* 7.7*   PLATELETS 10*3/mm3 146 129* 130* 132*       Intake/Output Summary (Last 24 hours) at 6/28/2024 1944  Last data filed at 6/28/2024 1700  Gross per 24 hour   Intake 240 ml   Output --   Net 240 ml           Assessment & Plan       Acute kidney injury: Last known stable cr ~ 1.4mg/dl. Cr 6.7 mg/dl on admission. UA large alison esterase+ trace protein blood moderate ( Not a clean catch) UACR - 80.9. Non oliguric - DDx - Pre-quinten/ATN vs AIN - improving. ANCA serology negative except for borderline + atypical ANCA ( non specific).     Hyperkalemia: In the setting of WALTER:      Met acidosis: Due to WALTER    Severe anemia: FOBT+. On AC. S/p EGD and colonoscopy 6/28/24.      DANIEL: Likely due to severe anemia     Hx of breast cancer: On anastrozole     Supra-therapeutic INR: On dabigatran. INR improving.   Recs  -  Conservative care from renal standpoint. D/C IV fluids at this point. Extensive discussion with family. If renal function continues to improve renal bx may not change the management at this stage.     Continue with current plan w outpatient f/u in 2 weeks after discharge. Patient would like to f/u with me in the office.         Reji Deutsch MD  06/28/24  19:44 EDT
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
## 2024-06-28 NOTE — BRIEF OP NOTE
COLONOSCOPY, ESOPHAGOGASTRODUODENOSCOPY  Progress Note    Moon Chillarige  6/28/2024    Colonoscopy is normal.     EGD shows mild gastritis. Biopsy taken for H. Pylori.    >> Daily PPI.  >> Await pathology.    Mark I. Brunner, MD     Date: 6/28/2024  Time: 09:32 EDT

## 2024-06-28 NOTE — PLAN OF CARE
Problem: Adult Inpatient Plan of Care  Goal: Plan of Care Review  Outcome: Ongoing, Progressing  Goal: Patient-Specific Goal (Individualized)  Outcome: Ongoing, Progressing  Goal: Absence of Hospital-Acquired Illness or Injury  Outcome: Ongoing, Progressing  Intervention: Identify and Manage Fall Risk  Description: Perform standard risk assessment on admission using a validated tool or comprehensive approach appropriate to the patient; reassess fall risk frequently, with change in status or transfer to another level of care.  Communicate fall injury risk to interprofessional healthcare team.  Determine need for increased observation, equipment and environmental modification, such as low bed, signage and supportive, nonskid footwear.  Adjust safety measures to individual developmental age, stage and identified risk factors.  Reinforce the importance of safety and physical activity with patient and family.  Perform regular intentional rounding to assess need for position change, pain assessment and personal needs, including assistance with toileting.  Recent Flowsheet Documentation  Taken 6/28/2024 1800 by Lena Woo, RN  Safety Promotion/Fall Prevention:   assistive device/personal items within reach   clutter free environment maintained   fall prevention program maintained   nonskid shoes/slippers when out of bed   room organization consistent   safety round/check completed  Taken 6/28/2024 1600 by Lena Woo, RN  Safety Promotion/Fall Prevention:   activity supervised   clutter free environment maintained   assistive device/personal items within reach   fall prevention program maintained   nonskid shoes/slippers when out of bed   room organization consistent   safety round/check completed  Taken 6/28/2024 1400 by Lena Woo, RN  Safety Promotion/Fall Prevention:   activity supervised   assistive device/personal items within reach   clutter free environment maintained   fall prevention program  maintained   nonskid shoes/slippers when out of bed   room organization consistent   safety round/check completed  Taken 6/28/2024 1200 by Lena Woo RN  Safety Promotion/Fall Prevention:   activity supervised   assistive device/personal items within reach   clutter free environment maintained   fall prevention program maintained   nonskid shoes/slippers when out of bed   room organization consistent   safety round/check completed  Taken 6/28/2024 1000 by Lena Woo RN  Safety Promotion/Fall Prevention:   activity supervised   assistive device/personal items within reach   clutter free environment maintained   fall prevention program maintained   nonskid shoes/slippers when out of bed   room organization consistent   safety round/check completed  Taken 6/28/2024 0800 by Lena Woo RN  Safety Promotion/Fall Prevention:   assistive device/personal items within reach   clutter free environment maintained   fall prevention program maintained   nonskid shoes/slippers when out of bed   room organization consistent   safety round/check completed  Intervention: Prevent Skin Injury  Description: Perform a screening for skin injury risk, such as pressure or moisture associated skin damage on admission and at regular intervals throughout hospital stay.  Keep all areas of skin (especially folds) clean and dry.  Maintain adequate skin hydration.  Relieve and redistribute pressure and protect bony prominences; implement measures based on patient-specific risk factors.  Match turning and repositioning schedule to clinical condition.  Encourage weight shift frequently; assist with reposition if unable to complete independently.  Float heels off bed; avoid pressure on the Achilles tendon.  Keep skin free from extended contact with medical devices.  Encourage functional activity and mobility, as early as tolerated.  Use aids (e.g., slide boards, mechanical lift) during transfer.  Recent Flowsheet  Documentation  Taken 6/28/2024 1800 by Lena Woo RN  Body Position:   position changed independently   lower extremity elevated   neutral body alignment   neutral head position  Taken 6/28/2024 1600 by Lena Woo RN  Body Position:   position changed independently   lower extremity elevated   neutral head position   neutral body alignment  Taken 6/28/2024 1400 by Lena Woo RN  Body Position:   position changed independently   lower extremity elevated   neutral body alignment   neutral head position  Taken 6/28/2024 1200 by Lena Woo RN  Body Position:   position changed independently   lower extremity elevated   neutral body alignment   neutral head position  Taken 6/28/2024 1000 by Lena Woo RN  Body Position:   position changed independently   lower extremity elevated   neutral body alignment   neutral head position  Taken 6/28/2024 0800 by Lena Woo RN  Body Position:   position changed independently   lower extremity elevated   neutral body alignment   neutral head position  Skin Protection:   adhesive use limited   skin sealant/moisture barrier applied   skin-to-device areas padded   skin-to-skin areas padded   transparent dressing maintained   tubing/devices free from skin contact  Intervention: Prevent and Manage VTE (Venous Thromboembolism) Risk  Description: Assess for VTE (venous thromboembolism) risk.  Encourage and assist with early ambulation.  Initiate and maintain compression or other therapy, as indicated, based on identified risk in accordance with organizational protocol and provider order.  Encourage both active and passive leg exercises while in bed, if unable to ambulate.  Recent Flowsheet Documentation  Taken 6/28/2024 1800 by Lena Woo RN  Activity Management: activity encouraged  Taken 6/28/2024 1600 by Lena Woo RN  Activity Management: activity encouraged  Taken 6/28/2024 1400 by Lena Woo RN  Activity Management: activity  encouraged  Taken 6/28/2024 1200 by Lena Woo RN  Activity Management: activity encouraged  Taken 6/28/2024 1000 by Lena Woo RN  Activity Management: activity encouraged  Taken 6/28/2024 0800 by Lena Woo RN  Activity Management: activity encouraged  Range of Motion: active ROM (range of motion) encouraged  Intervention: Prevent Infection  Description: Maintain skin and mucous membrane integrity; promote hand, oral and pulmonary hygiene.  Optimize fluid balance, nutrition, sleep and glycemic control to maximize infection resistance.  Identify potential sources of infection early to prevent or mitigate progression of infection (e.g., wound, lines, devices).  Evaluate ongoing need for invasive devices; remove promptly when no longer indicated.  Recent Flowsheet Documentation  Taken 6/28/2024 1800 by Lena Woo RN  Infection Prevention:   environmental surveillance performed   equipment surfaces disinfected   hand hygiene promoted   personal protective equipment utilized   rest/sleep promoted   single patient room provided  Taken 6/28/2024 1600 by Lena Woo RN  Infection Prevention:   environmental surveillance performed   equipment surfaces disinfected   hand hygiene promoted   personal protective equipment utilized   rest/sleep promoted   single patient room provided  Taken 6/28/2024 1400 by Lena Woo RN  Infection Prevention:   environmental surveillance performed   equipment surfaces disinfected   hand hygiene promoted   personal protective equipment utilized   rest/sleep promoted   single patient room provided  Taken 6/28/2024 1200 by Lena Woo RN  Infection Prevention:   environmental surveillance performed   equipment surfaces disinfected   personal protective equipment utilized   hand hygiene promoted   rest/sleep promoted   single patient room provided  Taken 6/28/2024 1000 by Lena Woo RN  Infection Prevention:   environmental surveillance performed    hand hygiene promoted   equipment surfaces disinfected   rest/sleep promoted   personal protective equipment utilized   single patient room provided  Taken 6/28/2024 0800 by Lena Woo RN  Infection Prevention:   environmental surveillance performed   equipment surfaces disinfected   hand hygiene promoted   personal protective equipment utilized   rest/sleep promoted   single patient room provided  Goal: Optimal Comfort and Wellbeing  Outcome: Ongoing, Progressing  Intervention: Monitor Pain and Promote Comfort  Description: Assess pain level, treatment efficacy and patient response at regular intervals using a consistent pain scale.  Consider the presence and impact of preexisting chronic pain.  Encourage patient and caregiver involvement in pain assessment, interventions and safety measures.  Recent Flowsheet Documentation  Taken 6/28/2024 0800 by Lena Woo RN  Pain Management Interventions:   pillow support provided   position adjusted   quiet environment facilitated  Intervention: Provide Person-Centered Care  Description: Use a family-focused approach to care.  Develop trust and rapport by proactively providing information, encouraging questions, addressing concerns and offering reassurance.  Acknowledge emotional response to hospitalization.  Recognize and utilize personal coping strategies.  Honor spiritual and cultural preferences.  Recent Flowsheet Documentation  Taken 6/28/2024 0800 by Lena Woo RN  Trust Relationship/Rapport:   choices provided   care explained   questions answered   questions encouraged   thoughts/feelings acknowledged  Goal: Readiness for Transition of Care  Outcome: Ongoing, Progressing   Goal Outcome Evaluation:

## 2024-06-28 NOTE — CASE MANAGEMENT/SOCIAL WORK
Continued Stay Note   [facility]     Patient Name: Moon Lerma  MRN: [id number]  Today's Date: 6/28/2024    Admit Date: 6/19/2024    Plan: update   Discharge Plan       Row Name 06/28/24 1152       Plan    Plan update    Patient/Family in Agreement with Plan yes    Plan Comments Patient to EGD/Colonoscopy this a.m.  Spoke with patient and family in room, patient up to chair who are asking when she can discharge.  Patient denies discharge needs at this time.  CM following.  Patient plan is to discharge home via car with family to transport.    Final Discharge Disposition Code 01 - home or self-care                   Discharge Codes    No documentation.                 Expected Discharge Date and Time       Expected Discharge Date Expected Discharge Time    Jun 30, 2024               Zoey Solis RN

## 2024-06-28 NOTE — ANESTHESIA PREPROCEDURE EVALUATION
Anesthesia Evaluation     Patient summary reviewed and Nursing notes reviewed   NPO Solid Status: > 8 hours  NPO Liquid Status: > 2 hours           Airway   Mallampati: III  TM distance: >3 FB  Neck ROM: full  Possible difficult intubation  Dental    (+) poor dentition    Pulmonary    (-) recent URI, not a smoker, no home oxygen    ROS comment: Sats 99% RA   Cardiovascular     ECG reviewed  PT is on anticoagulation therapy    (+) hypertension, DVT (chronic clotting d/o), hyperlipidemia  (-) past MI, dysrhythmias, angina, cardiac stents    ROS comment: ECG SR   ECHO 2024 EF >56 %.LVDD normal.RA mildly  dilated.RVSP is normal    Neuro/Psych  (-) seizures, CVA  GI/Hepatic/Renal/Endo    (+) GI bleeding , renal disease- CRI  (-) diabetes, no thyroid disorder    Musculoskeletal     Abdominal    Substance History      OB/GYN          Other   blood dyscrasia anemia,   history of cancer (breast ca)    ROS/Med Hx Other: ABLA GI source   Chemo for breast ca sp partial mastX       Phys Exam Other: Broken tooth rear mouth wanred re tooth loss and damage               Anesthesia Plan    ASA 3     general and MAC     (TIOP POM PFL )  intravenous induction     Anesthetic plan, risks, benefits, and alternatives have been provided, discussed and informed consent has been obtained with: patient.    Plan discussed with CRNA.    CODE STATUS:    Level Of Support Discussed With: Patient  Code Status (Patient has no pulse and is not breathing): CPR (Attempt to Resuscitate)  Medical Interventions (Patient has pulse or is breathing): Full Support

## 2024-06-28 NOTE — ANESTHESIA POSTPROCEDURE EVALUATION
Patient: Moon Lerma    Procedure Summary       Date: 06/28/24 Room / Location:  [facility] /  [facility]    Anesthesia Start: 0902 Anesthesia Stop: 0934    Procedures:       COLONOSCOPY      ESOPHAGOGASTRODUODENOSCOPY Diagnosis:       Acute GI bleeding      (Acute GI bleeding [K92.2])    Surgeons: Brunner, Mark I, MD Provider: Chilango Castrejon MD    Anesthesia Type: general, MAC ASA Status: 3            Anesthesia Type: general, MAC    Vitals  Vitals Value Taken Time   BP     Temp     Pulse 88 06/28/24 0939   Resp     SpO2 99 % 06/28/24 0939   Vitals shown include unfiled device data.        Post Anesthesia Care and Evaluation    Patient location during evaluation: PACU  Patient participation: waiting for patient participation  Level of consciousness: sleepy but conscious  Pain management: adequate    Airway patency: patent  Anesthetic complications: No anesthetic complications  PONV Status: none  Cardiovascular status: hemodynamically stable and acceptable  Respiratory status: nonlabored ventilation, acceptable, nasal cannula and oral airway  Hydration status: acceptable

## 2024-06-29 ENCOUNTER — APPOINTMENT (OUTPATIENT)
Dept: GENERAL RADIOLOGY | Facility: HOSPITAL | Age: 69
End: 2024-06-29
Payer: COMMERCIAL

## 2024-06-29 LAB
ANION GAP SERPL CALCULATED.3IONS-SCNC: 15 MMOL/L (ref 5–15)
B PARAPERT DNA SPEC QL NAA+PROBE: NOT DETECTED
B PERT DNA SPEC QL NAA+PROBE: NOT DETECTED
BACTERIA UR QL AUTO: ABNORMAL /HPF
BILIRUB UR QL STRIP: NEGATIVE
BUN SERPL-MCNC: 55 MG/DL (ref 8–23)
BUN/CREAT SERPL: 10.8 (ref 7–25)
C PNEUM DNA NPH QL NAA+NON-PROBE: NOT DETECTED
CALCIUM SPEC-SCNC: 8.8 MG/DL (ref 8.6–10.5)
CHLORIDE SERPL-SCNC: 105 MMOL/L (ref 98–107)
CLARITY UR: ABNORMAL
CO2 SERPL-SCNC: 18 MMOL/L (ref 22–29)
COLOR UR: ABNORMAL
CREAT SERPL-MCNC: 5.1 MG/DL (ref 0.57–1)
DEPRECATED RDW RBC AUTO: 53.5 FL (ref 37–54)
EGFRCR SERPLBLD CKD-EPI 2021: 8.8 ML/MIN/1.73
ERYTHROCYTE [DISTWIDTH] IN BLOOD BY AUTOMATED COUNT: 15.9 % (ref 12.3–15.4)
FLUAV SUBTYP SPEC NAA+PROBE: NOT DETECTED
FLUBV RNA ISLT QL NAA+PROBE: NOT DETECTED
GLUCOSE SERPL-MCNC: 111 MG/DL (ref 65–99)
GLUCOSE UR STRIP-MCNC: ABNORMAL MG/DL
HADV DNA SPEC NAA+PROBE: NOT DETECTED
HCOV 229E RNA SPEC QL NAA+PROBE: NOT DETECTED
HCOV HKU1 RNA SPEC QL NAA+PROBE: NOT DETECTED
HCOV NL63 RNA SPEC QL NAA+PROBE: NOT DETECTED
HCOV OC43 RNA SPEC QL NAA+PROBE: NOT DETECTED
HCT VFR BLD AUTO: 22.7 % (ref 34–46.6)
HGB BLD-MCNC: 7.4 G/DL (ref 12–15.9)
HGB UR QL STRIP.AUTO: ABNORMAL
HMPV RNA NPH QL NAA+NON-PROBE: NOT DETECTED
HPIV1 RNA ISLT QL NAA+PROBE: NOT DETECTED
HPIV2 RNA SPEC QL NAA+PROBE: NOT DETECTED
HPIV3 RNA NPH QL NAA+PROBE: NOT DETECTED
HPIV4 P GENE NPH QL NAA+PROBE: NOT DETECTED
HYALINE CASTS UR QL AUTO: ABNORMAL /LPF
INR PPP: 1.52 (ref 0.89–1.12)
KETONES UR QL STRIP: NEGATIVE
LEUKOCYTE ESTERASE UR QL STRIP.AUTO: ABNORMAL
M PNEUMO IGG SER IA-ACNC: NOT DETECTED
MCH RBC QN AUTO: 30.1 PG (ref 26.6–33)
MCHC RBC AUTO-ENTMCNC: 32.6 G/DL (ref 31.5–35.7)
MCV RBC AUTO: 92.3 FL (ref 79–97)
NITRITE UR QL STRIP: POSITIVE
PH UR STRIP.AUTO: 6 [PH] (ref 5–8)
PLATELET # BLD AUTO: 126 10*3/MM3 (ref 140–450)
PMV BLD AUTO: 11.7 FL (ref 6–12)
POTASSIUM SERPL-SCNC: 3.6 MMOL/L (ref 3.5–5.2)
PROCALCITONIN SERPL-MCNC: 0.43 NG/ML (ref 0–0.25)
PROT UR QL STRIP: ABNORMAL
PROTHROMBIN TIME: 18.4 SECONDS (ref 12.2–14.5)
QT INTERVAL: 372 MS
QTC INTERVAL: 424 MS
RBC # BLD AUTO: 2.46 10*6/MM3 (ref 3.77–5.28)
RBC # UR STRIP: ABNORMAL /HPF
REF LAB TEST METHOD: ABNORMAL
RHINOVIRUS RNA SPEC NAA+PROBE: NOT DETECTED
RSV RNA NPH QL NAA+NON-PROBE: NOT DETECTED
SARS-COV-2 RNA NPH QL NAA+NON-PROBE: NOT DETECTED
SODIUM SERPL-SCNC: 138 MMOL/L (ref 136–145)
SP GR UR STRIP: 1.01 (ref 1–1.03)
SQUAMOUS #/AREA URNS HPF: ABNORMAL /HPF
UROBILINOGEN UR QL STRIP: ABNORMAL
WBC # UR STRIP: ABNORMAL /HPF
WBC NRBC COR # BLD AUTO: 14.59 10*3/MM3 (ref 3.4–10.8)

## 2024-06-29 PROCEDURE — 80048 BASIC METABOLIC PNL TOTAL CA: CPT | Performed by: INTERNAL MEDICINE

## 2024-06-29 PROCEDURE — 0202U NFCT DS 22 TRGT SARS-COV-2: CPT | Performed by: INTERNAL MEDICINE

## 2024-06-29 PROCEDURE — 25010000002 HEPARIN (PORCINE) PER 1000 UNITS: Performed by: INTERNAL MEDICINE

## 2024-06-29 PROCEDURE — 99232 SBSQ HOSP IP/OBS MODERATE 35: CPT | Performed by: INTERNAL MEDICINE

## 2024-06-29 PROCEDURE — 87040 BLOOD CULTURE FOR BACTERIA: CPT | Performed by: INTERNAL MEDICINE

## 2024-06-29 PROCEDURE — 25010000002 ONDANSETRON PER 1 MG: Performed by: INTERNAL MEDICINE

## 2024-06-29 PROCEDURE — 81001 URINALYSIS AUTO W/SCOPE: CPT | Performed by: INTERNAL MEDICINE

## 2024-06-29 PROCEDURE — 85610 PROTHROMBIN TIME: CPT | Performed by: INTERNAL MEDICINE

## 2024-06-29 PROCEDURE — 87186 SC STD MICRODIL/AGAR DIL: CPT | Performed by: INTERNAL MEDICINE

## 2024-06-29 PROCEDURE — 25010000002 CEFTRIAXONE PER 250 MG: Performed by: INTERNAL MEDICINE

## 2024-06-29 PROCEDURE — 25810000003 LACTATED RINGERS PER 1000 ML: Performed by: INTERNAL MEDICINE

## 2024-06-29 PROCEDURE — 85027 COMPLETE CBC AUTOMATED: CPT | Performed by: INTERNAL MEDICINE

## 2024-06-29 PROCEDURE — 87147 CULTURE TYPE IMMUNOLOGIC: CPT | Performed by: INTERNAL MEDICINE

## 2024-06-29 PROCEDURE — 84145 PROCALCITONIN (PCT): CPT | Performed by: INTERNAL MEDICINE

## 2024-06-29 PROCEDURE — 87154 CUL TYP ID BLD PTHGN 6+ TRGT: CPT | Performed by: INTERNAL MEDICINE

## 2024-06-29 PROCEDURE — 87088 URINE BACTERIA CULTURE: CPT | Performed by: INTERNAL MEDICINE

## 2024-06-29 PROCEDURE — 71045 X-RAY EXAM CHEST 1 VIEW: CPT

## 2024-06-29 PROCEDURE — 87086 URINE CULTURE/COLONY COUNT: CPT | Performed by: INTERNAL MEDICINE

## 2024-06-29 RX ORDER — ONDANSETRON 2 MG/ML
4 INJECTION INTRAMUSCULAR; INTRAVENOUS EVERY 6 HOURS PRN
Status: DISCONTINUED | OUTPATIENT
Start: 2024-06-29 | End: 2024-07-03 | Stop reason: HOSPADM

## 2024-06-29 RX ORDER — SODIUM CHLORIDE, SODIUM LACTATE, POTASSIUM CHLORIDE, CALCIUM CHLORIDE 600; 310; 30; 20 MG/100ML; MG/100ML; MG/100ML; MG/100ML
50 INJECTION, SOLUTION INTRAVENOUS CONTINUOUS
Status: DISCONTINUED | OUTPATIENT
Start: 2024-06-29 | End: 2024-06-30

## 2024-06-29 RX ADMIN — ACETAMINOPHEN 650 MG: 325 TABLET, FILM COATED ORAL at 15:32

## 2024-06-29 RX ADMIN — SODIUM CHLORIDE 1000 MG: 900 INJECTION INTRAVENOUS at 18:39

## 2024-06-29 RX ADMIN — TERAZOSIN HYDROCHLORIDE 2 MG: 2 CAPSULE ORAL at 20:51

## 2024-06-29 RX ADMIN — ONDANSETRON 4 MG: 2 INJECTION INTRAMUSCULAR; INTRAVENOUS at 15:32

## 2024-06-29 RX ADMIN — FOLIC ACID 1 MG: 1 TABLET ORAL at 08:01

## 2024-06-29 RX ADMIN — DIPHENHYDRAMINE HYDROCHLORIDE AND LIDOCAINE HYDROCHLORIDE AND ALUMINUM HYDROXIDE AND MAGNESIUM HYDRO 5 ML: KIT at 12:53

## 2024-06-29 RX ADMIN — NEBIVOLOL 2.5 MG: 2.5 TABLET ORAL at 08:01

## 2024-06-29 RX ADMIN — DIPHENHYDRAMINE HYDROCHLORIDE AND LIDOCAINE HYDROCHLORIDE AND ALUMINUM HYDROXIDE AND MAGNESIUM HYDRO 5 ML: KIT at 06:51

## 2024-06-29 RX ADMIN — ACETAMINOPHEN 650 MG: 325 TABLET, FILM COATED ORAL at 04:17

## 2024-06-29 RX ADMIN — SODIUM CHLORIDE, POTASSIUM CHLORIDE, SODIUM LACTATE AND CALCIUM CHLORIDE 50 ML/HR: 600; 310; 30; 20 INJECTION, SOLUTION INTRAVENOUS at 16:11

## 2024-06-29 RX ADMIN — Medication 10 ML: at 08:05

## 2024-06-29 RX ADMIN — HEPARIN SODIUM 5000 UNITS: 5000 INJECTION INTRAVENOUS; SUBCUTANEOUS at 12:52

## 2024-06-29 RX ADMIN — DIPHENHYDRAMINE HYDROCHLORIDE AND LIDOCAINE HYDROCHLORIDE AND ALUMINUM HYDROXIDE AND MAGNESIUM HYDRO 5 ML: KIT at 20:52

## 2024-06-29 RX ADMIN — ACETAMINOPHEN 650 MG: 325 TABLET, FILM COATED ORAL at 09:56

## 2024-06-29 RX ADMIN — ROSUVASTATIN 10 MG: 10 TABLET, FILM COATED ORAL at 20:51

## 2024-06-29 RX ADMIN — Medication 10 ML: at 06:52

## 2024-06-29 RX ADMIN — ACETAMINOPHEN 650 MG: 325 TABLET, FILM COATED ORAL at 21:29

## 2024-06-29 RX ADMIN — PANTOPRAZOLE SODIUM 40 MG: 40 INJECTION, POWDER, FOR SOLUTION INTRAVENOUS at 08:02

## 2024-06-29 RX ADMIN — HEPARIN SODIUM 5000 UNITS: 5000 INJECTION INTRAVENOUS; SUBCUTANEOUS at 06:51

## 2024-06-29 RX ADMIN — Medication 10 ML: at 20:52

## 2024-06-29 RX ADMIN — HEPARIN SODIUM 5000 UNITS: 5000 INJECTION INTRAVENOUS; SUBCUTANEOUS at 20:51

## 2024-06-29 NOTE — PROGRESS NOTES
" LOS: 10 days   Patient Care Team:  Carmen Singh PA-C as PCP - General (Physician Assistant)    Chief Complaint: Acute kidney injury anemia, GI bleed        Subjective   Developed fever last night, workup in progress.  Appetite is down today.  Underwent EGD and colonoscopy. Mild gastritis noted.         History taken from: patient  Decreased appetite, fever and chills.    Objective     Vital Sign Min/Max for last 24 hours  Temp  Min: 97.2 °F (36.2 °C)  Max: 102.2 °F (39 °C)   BP  Min: 113/63  Max: 144/89   Pulse  Min: 95  Max: 119   Resp  Min: 16  Max: 18   SpO2  Min: 96 %  Max: 98 %   No data recorded   No data recorded     Flowsheet Rows      Flowsheet Row First Filed Value   Admission Height 152.4 cm (60\") Documented at 06/19/2024 1315   Admission Weight 128 kg (282 lb 3 oz) Documented at 06/19/2024 1315            No intake/output data recorded.  I/O last 3 completed shifts:  In: 240 [P.O.:240]  Out: -     Vitals:    06/29/24 0723 06/29/24 1000 06/29/24 1156 06/29/24 1500   BP: 113/63  120/73 144/89   BP Location: Left arm  Left arm Left arm   Patient Position: Sitting  Lying Sitting   Pulse:       Resp: 18  16 18   Temp: 99.6 °F (37.6 °C) (!) 102.1 °F (38.9 °C) (!) 100.9 °F (38.3 °C) (!) 102.2 °F (39 °C)   TempSrc: Oral  Oral Oral   SpO2:       Weight:       Height:         General Appearance: Alert, oriented, patient female morbidly obese mild distress  Eyes: PER, EOMI.  Neck: Supple no JVD.  Lungs: Clear auscultation, no rales rhonchi's, equal chest movement, nonlabored.  Heart: No gallop, murmur, rub, RRR.  Abdomen: Soft, nontender, positive bowel sounds, morbid obesity  Extremities: Positive edema, no cyanosis.  Nonpitting edema  Neuro: No focal deficit, moving all extremities, alert oriented X 3          Results Review:     I reviewed the patient's new clinical results.    WBC WBC   Date Value Ref Range Status   06/29/2024 14.59 (H) 3.40 - 10.80 10*3/mm3 Final   06/28/2024 5.90 3.40 - 10.80 " "10*3/mm3 Final   06/27/2024 5.83 3.40 - 10.80 10*3/mm3 Final      HGB Hemoglobin   Date Value Ref Range Status   06/29/2024 7.4 (L) 12.0 - 15.9 g/dL Final   06/28/2024 7.7 (L) 12.0 - 15.9 g/dL Final   06/27/2024 7.6 (L) 12.0 - 15.9 g/dL Final      HCT Hematocrit   Date Value Ref Range Status   06/29/2024 22.7 (L) 34.0 - 46.6 % Final   06/28/2024 24.2 (L) 34.0 - 46.6 % Final   06/27/2024 23.9 (L) 34.0 - 46.6 % Final      Platlets No results found for: \"LABPLAT\"   MCV MCV   Date Value Ref Range Status   06/29/2024 92.3 79.0 - 97.0 fL Final   06/28/2024 93.1 79.0 - 97.0 fL Final   06/27/2024 94.5 79.0 - 97.0 fL Final          Sodium Sodium   Date Value Ref Range Status   06/29/2024 138 136 - 145 mmol/L Final   06/28/2024 139 136 - 145 mmol/L Final   06/27/2024 140 136 - 145 mmol/L Final      Potassium Potassium   Date Value Ref Range Status   06/29/2024 3.6 3.5 - 5.2 mmol/L Final   06/28/2024 3.7 3.5 - 5.2 mmol/L Final   06/27/2024 4.0 3.5 - 5.2 mmol/L Final      Chloride Chloride   Date Value Ref Range Status   06/29/2024 105 98 - 107 mmol/L Final   06/28/2024 104 98 - 107 mmol/L Final   06/27/2024 106 98 - 107 mmol/L Final      CO2 CO2   Date Value Ref Range Status   06/29/2024 18.0 (L) 22.0 - 29.0 mmol/L Final   06/28/2024 20.0 (L) 22.0 - 29.0 mmol/L Final   06/27/2024 24.0 22.0 - 29.0 mmol/L Final      BUN BUN   Date Value Ref Range Status   06/29/2024 55 (H) 8 - 23 mg/dL Final   06/28/2024 61 (H) 8 - 23 mg/dL Final   06/27/2024 64 (H) 8 - 23 mg/dL Final      Creatinine Creatinine   Date Value Ref Range Status   06/29/2024 5.10 (H) 0.57 - 1.00 mg/dL Final   06/28/2024 5.12 (H) 0.57 - 1.00 mg/dL Final   06/27/2024 5.31 (H) 0.57 - 1.00 mg/dL Final      Calcium Calcium   Date Value Ref Range Status   06/29/2024 8.8 8.6 - 10.5 mg/dL Final   06/28/2024 9.1 8.6 - 10.5 mg/dL Final   06/27/2024 9.0 8.6 - 10.5 mg/dL Final      PO4 No results found for: \"CAPO4\"   Albumin No results found for: \"ALBUMIN\"     Magnesium No " "results found for: \"MG\"     Uric Acid No results found for: \"URICACID\"     Medication Review: Yes    Assessment & Plan       Renal failure    Acute blood loss anemia    Hyperkalemia    Acute upper GI bleeding    Chronic deep vein thrombosis (DVT)    Primary hypertension    Chronic acquired lymphedema    Chronic cutaneous venous stasis ulcer    Acute GI bleeding    Results from last 7 days   Lab Units 06/29/24  0745 06/28/24  0740 06/27/24  1008 06/26/24  0327   SODIUM mmol/L 138 139 140 139   POTASSIUM mmol/L 3.6 3.7 4.0 3.8   CHLORIDE mmol/L 105 104 106 103   CO2 mmol/L 18.0* 20.0* 24.0 20.0*   BUN mg/dL 55* 61* 64* 66*   CREATININE mg/dL 5.10* 5.12* 5.31* 4.84*   CALCIUM mg/dL 8.8 9.1 9.0 8.8   WBC 10*3/mm3 14.59* 5.90 5.83 5.71   HEMOGLOBIN g/dL 7.4* 7.7* 7.6* 7.5*   PLATELETS 10*3/mm3 126* 146 129* 130*       Intake/Output Summary (Last 24 hours) at 6/29/2024 1626  Last data filed at 6/28/2024 1700  Gross per 24 hour   Intake 240 ml   Output --   Net 240 ml     Assessment and plan:         1.  Acute kidney injury: Last known stable cr ~ 1.4mg/dl. Cr 6.7 mg/dl on admission. UA large alison esterase+ trace protein blood moderate ( Not a clean catch) UACR - 80.9. Non oliguric - DDx - Pre-quinten/ATN vs AIN - improving. ANCA serology negative except for borderline + atypical ANCA ( non specific).     2.  Hyperkalemia: In the setting of WALTER:      3.  Met acidosis: Due to WALTER    4.  Severe anemia: FOBT+. On AC. S/p EGD and colonoscopy 6/28/24.      5.  DANIEL: Likely due to severe anemia     6.  Hx of breast cancer: On anastrozole     7.  Supra-therapeutic INR: On dabigatran. INR improving.   Recommendation:  -  Conservative care from renal standpoint. D/C IV fluids at this point. Extensive discussion with family. If renal function continues to improve renal bx may not change the management at this stage.     Continue with current plan w outpatient f/u in 2 weeks after discharge. Patient would like to f/u with me in the " office.         Adolfo Reynoso MD  06/29/24  16:26 EDT

## 2024-06-29 NOTE — PLAN OF CARE
Problem: Adult Inpatient Plan of Care  Goal: Plan of Care Review  Outcome: Ongoing, Progressing  Goal: Patient-Specific Goal (Individualized)  Outcome: Ongoing, Progressing  Goal: Absence of Hospital-Acquired Illness or Injury  Outcome: Ongoing, Progressing  Intervention: Identify and Manage Fall Risk  Recent Flowsheet Documentation  Taken 6/29/2024 0600 by Chanel Soria RN  Safety Promotion/Fall Prevention:   activity supervised   assistive device/personal items within reach   clutter free environment maintained   fall prevention program maintained   mobility aid in reach   nonskid shoes/slippers when out of bed   room organization consistent   safety round/check completed  Taken 6/29/2024 0400 by Chanel Soria RN  Safety Promotion/Fall Prevention:   assistive device/personal items within reach   mobility aid in reach   nonskid shoes/slippers when out of bed   room organization consistent   safety round/check completed  Taken 6/29/2024 0200 by Chanel Soria RN  Safety Promotion/Fall Prevention:   assistive device/personal items within reach   activity supervised  Taken 6/29/2024 0000 by Chanel Soria RN  Safety Promotion/Fall Prevention:   assistive device/personal items within reach   fall prevention program maintained   activity supervised   mobility aid in reach   nonskid shoes/slippers when out of bed   room organization consistent   safety round/check completed  Taken 6/28/2024 2200 by Chanel Soria, RN  Safety Promotion/Fall Prevention:   assistive device/personal items within reach   activity supervised  Taken 6/28/2024 2000 by Chanel Soria, RN  Safety Promotion/Fall Prevention:   assistive device/personal items within reach   clutter free environment maintained   fall prevention program maintained   nonskid shoes/slippers when out of bed   room organization consistent   safety round/check completed  Intervention: Prevent Skin Injury  Recent Flowsheet Documentation  Taken  6/29/2024 0600 by Chanel Soria RN  Body Position: position changed independently  Taken 6/29/2024 0400 by Chanel Soria RN  Body Position: position changed independently  Skin Protection:   adhesive use limited   skin-to-device areas padded   skin-to-skin areas padded   transparent dressing maintained   tubing/devices free from skin contact  Taken 6/29/2024 0200 by Chanel Soria RN  Body Position: position changed independently  Taken 6/29/2024 0000 by Chanel Soria RN  Body Position: position changed independently  Skin Protection:   adhesive use limited   skin sealant/moisture barrier applied   skin-to-device areas padded   skin-to-skin areas padded   transparent dressing maintained   tubing/devices free from skin contact  Taken 6/28/2024 2200 by Chanel Soria RN  Body Position: position changed independently  Taken 6/28/2024 2000 by Chanel Soria RN  Body Position: position changed independently  Skin Protection:   adhesive use limited   skin sealant/moisture barrier applied   skin-to-device areas padded   skin-to-skin areas padded   transparent dressing maintained   tubing/devices free from skin contact  Intervention: Prevent and Manage VTE (Venous Thromboembolism) Risk  Recent Flowsheet Documentation  Taken 6/29/2024 0600 by Chanel Soria RN  Activity Management: activity encouraged  Taken 6/29/2024 0400 by Chanel Soria RN  Activity Management: activity encouraged  VTE Prevention/Management: other (see comments)  Taken 6/29/2024 0200 by Chanel Soria RN  Activity Management: activity encouraged  Taken 6/29/2024 0000 by Chanel Soria RN  Activity Management: activity encouraged  VTE Prevention/Management: other (see comments)  Taken 6/28/2024 2200 by Chanel Soria RN  Activity Management: activity encouraged  Taken 6/28/2024 2000 by Chanel Soria RN  Activity Management: activity encouraged  VTE Prevention/Management: other (see comments)  Range of  Motion: active ROM (range of motion) encouraged  Intervention: Prevent Infection  Recent Flowsheet Documentation  Taken 6/29/2024 0600 by Chanel Soria RN  Infection Prevention:   environmental surveillance performed   hand hygiene promoted   rest/sleep promoted   single patient room provided  Taken 6/29/2024 0400 by Chanel Soria RN  Infection Prevention:   environmental surveillance performed   hand hygiene promoted   rest/sleep promoted   single patient room provided  Taken 6/29/2024 0200 by Chanel Soria RN  Infection Prevention:   environmental surveillance performed   hand hygiene promoted   rest/sleep promoted   single patient room provided  Taken 6/29/2024 0000 by Chanel Soria RN  Infection Prevention:   environmental surveillance performed   hand hygiene promoted   rest/sleep promoted   single patient room provided  Taken 6/28/2024 2200 by Chanel Soria RN  Infection Prevention:   environmental surveillance performed   hand hygiene promoted   rest/sleep promoted   single patient room provided  Taken 6/28/2024 2000 by Chanel Soria RN  Infection Prevention:   environmental surveillance performed   equipment surfaces disinfected   hand hygiene promoted   personal protective equipment utilized   rest/sleep promoted   single patient room provided  Goal: Optimal Comfort and Wellbeing  Outcome: Ongoing, Progressing  Intervention: Provide Person-Centered Care  Recent Flowsheet Documentation  Taken 6/29/2024 0400 by Chanel Soria RN  Trust Relationship/Rapport:   care explained   choices provided   emotional support provided   empathic listening provided   questions answered   questions encouraged   reassurance provided   thoughts/feelings acknowledged  Taken 6/29/2024 0000 by Chanel Soria RN  Trust Relationship/Rapport:   care explained   choices provided   emotional support provided   empathic listening provided   questions answered   questions encouraged   reassurance  provided   thoughts/feelings acknowledged  Taken 6/28/2024 2000 by Chanel Soria RN  Trust Relationship/Rapport:   care explained   choices provided   emotional support provided   empathic listening provided   questions answered   questions encouraged   reassurance provided   thoughts/feelings acknowledged  Goal: Readiness for Transition of Care  Outcome: Ongoing, Progressing     Problem: Skin Injury Risk Increased  Goal: Skin Health and Integrity  Outcome: Ongoing, Progressing  Intervention: Optimize Skin Protection  Recent Flowsheet Documentation  Taken 6/29/2024 0600 by Chanel Soria RN  Head of Bed ([facility]) Positioning:   HOB elevated   HOB at 30 degrees  Taken 6/29/2024 0400 by Chanel Soria RN  Pressure Reduction Techniques:   positioned off wounds   heels elevated off bed   pressure points protected   weight shift assistance provided  Head of Bed ([facility]) Positioning:   HOB elevated   HOB at 30 degrees  Pressure Reduction Devices:   positioning supports utilized   heel offloading device utilized  Skin Protection:   adhesive use limited   skin-to-device areas padded   skin-to-skin areas padded   transparent dressing maintained   tubing/devices free from skin contact  Taken 6/29/2024 0200 by Chanel Soria RN  Head of Bed ([facility]) Positioning:   HOB elevated   HOB at 30 degrees  Taken 6/29/2024 0000 by Chanel Soria RN  Pressure Reduction Techniques:   positioned off wounds   heels elevated off bed   pressure points protected   weight shift assistance provided  Head of Bed ([facility]) Positioning:   HOB elevated   HOB at 30 degrees  Pressure Reduction Devices:   positioning supports utilized   heel offloading device utilized  Skin Protection:   adhesive use limited   skin sealant/moisture barrier applied   skin-to-device areas padded   skin-to-skin areas padded   transparent dressing maintained   tubing/devices free from skin contact  Taken 6/28/2024 2200 by Chanel Soria RN  Head of Bed ([facility])  Positioning:   HOB elevated   HOB at 30 degrees  Taken 6/28/2024 2000 by Chanel Soria RN  Pressure Reduction Techniques:   positioned off wounds   pressure points protected  Head of Bed ([facility]) Positioning:   HOB elevated   HOB at 30 degrees  Pressure Reduction Devices:   positioning supports utilized   heel offloading device utilized  Skin Protection:   adhesive use limited   skin sealant/moisture barrier applied   skin-to-device areas padded   skin-to-skin areas padded   transparent dressing maintained   tubing/devices free from skin contact     Problem: Fall Injury Risk  Goal: Absence of Fall and Fall-Related Injury  Outcome: Ongoing, Progressing  Intervention: Identify and Manage Contributors  Recent Flowsheet Documentation  Taken 6/29/2024 0600 by Chanel Soria RN  Medication Review/Management: medications reviewed  Taken 6/29/2024 0400 by Chanel Soria RN  Medication Review/Management: medications reviewed  Taken 6/29/2024 0200 by Chanel Soria, RN  Medication Review/Management: medications reviewed  Taken 6/29/2024 0000 by Chanel Soria, RN  Medication Review/Management: medications reviewed  Taken 6/28/2024 2200 by Chanel Soria, RN  Medication Review/Management: medications reviewed  Taken 6/28/2024 2000 by Chanel Soria RN  Medication Review/Management: medications reviewed  Intervention: Promote Injury-Free Environment  Recent Flowsheet Documentation  Taken 6/29/2024 0600 by Chanel Soria, RN  Safety Promotion/Fall Prevention:   activity supervised   assistive device/personal items within reach   clutter free environment maintained   fall prevention program maintained   mobility aid in reach   nonskid shoes/slippers when out of bed   room organization consistent   safety round/check completed  Taken 6/29/2024 0400 by Chanel Soria, RN  Safety Promotion/Fall Prevention:   assistive device/personal items within reach   mobility aid in reach   nonskid shoes/slippers  when out of bed   room organization consistent   safety round/check completed  Taken 6/29/2024 0200 by Chanel Soria RN  Safety Promotion/Fall Prevention:   assistive device/personal items within reach   activity supervised  Taken 6/29/2024 0000 by Chanel Soria RN  Safety Promotion/Fall Prevention:   assistive device/personal items within reach   fall prevention program maintained   activity supervised   mobility aid in reach   nonskid shoes/slippers when out of bed   room organization consistent   safety round/check completed  Taken 6/28/2024 2200 by Chanel Soria RN  Safety Promotion/Fall Prevention:   assistive device/personal items within reach   activity supervised  Taken 6/28/2024 2000 by Chanel Soria RN  Safety Promotion/Fall Prevention:   assistive device/personal items within reach   clutter free environment maintained   fall prevention program maintained   nonskid shoes/slippers when out of bed   room organization consistent   safety round/check completed     Problem: Adult Inpatient Plan of Care  Goal: Absence of Hospital-Acquired Illness or Injury  Intervention: Prevent Skin Injury  Recent Flowsheet Documentation  Taken 6/29/2024 0600 by Chanel Soria RN  Body Position: position changed independently  Taken 6/29/2024 0400 by Chanel Soria RN  Body Position: position changed independently  Skin Protection:   adhesive use limited   skin-to-device areas padded   skin-to-skin areas padded   transparent dressing maintained   tubing/devices free from skin contact  Taken 6/29/2024 0200 by Chanel Soria RN  Body Position: position changed independently  Taken 6/29/2024 0000 by Chanel Soria RN  Body Position: position changed independently  Skin Protection:   adhesive use limited   skin sealant/moisture barrier applied   skin-to-device areas padded   skin-to-skin areas padded   transparent dressing maintained   tubing/devices free from skin contact  Taken 6/28/2024 2200  by Chanel Soria RN  Body Position: position changed independently  Taken 6/28/2024 2000 by Chanel Soria RN  Body Position: position changed independently  Skin Protection:   adhesive use limited   skin sealant/moisture barrier applied   skin-to-device areas padded   skin-to-skin areas padded   transparent dressing maintained   tubing/devices free from skin contact     Problem: Adult Inpatient Plan of Care  Goal: Absence of Hospital-Acquired Illness or Injury  Intervention: Prevent and Manage VTE (Venous Thromboembolism) Risk  Recent Flowsheet Documentation  Taken 6/29/2024 0600 by Chanel Soria RN  Activity Management: activity encouraged  Taken 6/29/2024 0400 by Chanel Soria RN  Activity Management: activity encouraged  VTE Prevention/Management: other (see comments)  Taken 6/29/2024 0200 by Chanel Soria RN  Activity Management: activity encouraged  Taken 6/29/2024 0000 by Chanel Soria RN  Activity Management: activity encouraged  VTE Prevention/Management: other (see comments)  Taken 6/28/2024 2200 by Chanel Soria RN  Activity Management: activity encouraged  Taken 6/28/2024 2000 by Chanel Soria RN  Activity Management: activity encouraged  VTE Prevention/Management: other (see comments)  Range of Motion: active ROM (range of motion) encouraged     Problem: Adult Inpatient Plan of Care  Goal: Absence of Hospital-Acquired Illness or Injury  Intervention: Prevent Infection  Recent Flowsheet Documentation  Taken 6/29/2024 0600 by Chanel Soria RN  Infection Prevention:   environmental surveillance performed   hand hygiene promoted   rest/sleep promoted   single patient room provided  Taken 6/29/2024 0400 by Chanel Soria RN  Infection Prevention:   environmental surveillance performed   hand hygiene promoted   rest/sleep promoted   single patient room provided  Taken 6/29/2024 0200 by Chanel Soria RN  Infection Prevention:   environmental surveillance  performed   hand hygiene promoted   rest/sleep promoted   single patient room provided  Taken 6/29/2024 0000 by Chanel Soria RN  Infection Prevention:   environmental surveillance performed   hand hygiene promoted   rest/sleep promoted   single patient room provided  Taken 6/28/2024 2200 by Chanel Soria RN  Infection Prevention:   environmental surveillance performed   hand hygiene promoted   rest/sleep promoted   single patient room provided  Taken 6/28/2024 2000 by Chanel Soria RN  Infection Prevention:   environmental surveillance performed   equipment surfaces disinfected   hand hygiene promoted   personal protective equipment utilized   rest/sleep promoted   single patient room provided   Goal Outcome Evaluation:

## 2024-06-29 NOTE — PLAN OF CARE
Problem: Adult Inpatient Plan of Care  Goal: Plan of Care Review  Outcome: Ongoing, Progressing  Goal: Patient-Specific Goal (Individualized)  Outcome: Ongoing, Progressing  Goal: Absence of Hospital-Acquired Illness or Injury  Outcome: Ongoing, Progressing  Intervention: Identify and Manage Fall Risk  Recent Flowsheet Documentation  Taken 6/29/2024 1218 by Sanford Iqbal RN  Safety Promotion/Fall Prevention:   activity supervised   clutter free environment maintained   assistive device/personal items within reach   lighting adjusted   nonskid shoes/slippers when out of bed   safety round/check completed   room organization consistent  Taken 6/29/2024 1000 by Sanford Iqbal RN  Safety Promotion/Fall Prevention:   activity supervised   assistive device/personal items within reach   clutter free environment maintained   lighting adjusted   nonskid shoes/slippers when out of bed   safety round/check completed   room organization consistent  Intervention: Prevent Skin Injury  Recent Flowsheet Documentation  Taken 6/29/2024 1218 by Sanford Iqbal RN  Skin Protection:   adhesive use limited   tubing/devices free from skin contact   transparent dressing maintained   skin-to-skin areas padded   skin-to-device areas padded   incontinence pads utilized  Taken 6/29/2024 1000 by Sanford Iqbal RN  Body Position: position changed independently  Skin Protection:   adhesive use limited   tubing/devices free from skin contact   transparent dressing maintained   skin-to-skin areas padded   skin-to-device areas padded   incontinence pads utilized  Taken 6/29/2024 0900 by Sanford Iqbal RN  Skin Protection:   adhesive use limited   tubing/devices free from skin contact   transparent dressing maintained   skin-to-device areas padded   skin-to-skin areas padded   incontinence pads utilized  Intervention: Prevent Infection  Recent Flowsheet Documentation  Taken 6/29/2024 1218 by Sanford Iqbal RN  Infection Prevention:   environmental surveillance  performed   equipment surfaces disinfected   hand hygiene promoted   personal protective equipment utilized   rest/sleep promoted   single patient room provided  Taken 6/29/2024 1000 by Sanford Iqbal RN  Infection Prevention:   environmental surveillance performed   equipment surfaces disinfected   hand hygiene promoted   personal protective equipment utilized   rest/sleep promoted   single patient room provided  Taken 6/29/2024 0900 by Sanford Iqbal RN  Infection Prevention: environmental surveillance performed  Goal: Optimal Comfort and Wellbeing  Outcome: Ongoing, Progressing  Intervention: Monitor Pain and Promote Comfort  Recent Flowsheet Documentation  Taken 6/29/2024 1218 by Sanford Iqbal RN  Pain Management Interventions:   quiet environment facilitated   see MAR  Taken 6/29/2024 1000 by Sanford Iqbal RN  Pain Management Interventions:   see MAR   quiet environment facilitated  Taken 6/29/2024 0900 by Sanford Iqbal RN  Pain Management Interventions:   see MAR   quiet environment facilitated  Intervention: Provide Person-Centered Care  Recent Flowsheet Documentation  Taken 6/29/2024 1218 by Sanford Iqbal RN  Trust Relationship/Rapport:   care explained   questions answered   questions encouraged  Taken 6/29/2024 1000 by Sanford Iqbal RN  Trust Relationship/Rapport:   care explained   questions answered   questions encouraged  Taken 6/29/2024 0900 by Sanford Iqbal RN  Trust Relationship/Rapport:   care explained   questions answered   questions encouraged  Goal: Readiness for Transition of Care  Outcome: Ongoing, Progressing     Problem: Skin Injury Risk Increased  Goal: Skin Health and Integrity  Outcome: Ongoing, Progressing  Intervention: Optimize Skin Protection  Recent Flowsheet Documentation  Taken 6/29/2024 1218 by Sanford Iqbal RN  Pressure Reduction Techniques:   frequent weight shift encouraged   weight shift assistance provided  Pressure Reduction Devices:   pressure-redistributing mattress utilized    positioning supports utilized  Skin Protection:   adhesive use limited   tubing/devices free from skin contact   transparent dressing maintained   skin-to-skin areas padded   skin-to-device areas padded   incontinence pads utilized  Taken 6/29/2024 1000 by Sanford Iqbal RN  Pressure Reduction Techniques:   frequent weight shift encouraged   weight shift assistance provided  Pressure Reduction Devices:   positioning supports utilized   pressure-redistributing mattress utilized  Skin Protection:   adhesive use limited   tubing/devices free from skin contact   transparent dressing maintained   skin-to-skin areas padded   skin-to-device areas padded   incontinence pads utilized  Taken 6/29/2024 0900 by Sanford Iqbal RN  Pressure Reduction Techniques:   frequent weight shift encouraged   weight shift assistance provided  Pressure Reduction Devices:   positioning supports utilized   pressure-redistributing mattress utilized  Skin Protection:   adhesive use limited   tubing/devices free from skin contact   transparent dressing maintained   skin-to-device areas padded   skin-to-skin areas padded   incontinence pads utilized     Problem: Fall Injury Risk  Goal: Absence of Fall and Fall-Related Injury  Outcome: Ongoing, Progressing  Intervention: Identify and Manage Contributors  Recent Flowsheet Documentation  Taken 6/29/2024 1218 by Sanford Iqbal RN  Medication Review/Management: medications reviewed  Taken 6/29/2024 1000 by Sanford Iqbal RN  Medication Review/Management: medications reviewed  Intervention: Promote Injury-Free Environment  Recent Flowsheet Documentation  Taken 6/29/2024 1218 by Sanford Iqbal RN  Safety Promotion/Fall Prevention:   activity supervised   clutter free environment maintained   assistive device/personal items within reach   lighting adjusted   nonskid shoes/slippers when out of bed   safety round/check completed   room organization consistent  Taken 6/29/2024 1000 by Sanford Iqbal RN  Safety  Promotion/Fall Prevention:   activity supervised   assistive device/personal items within reach   clutter free environment maintained   lighting adjusted   nonskid shoes/slippers when out of bed   safety round/check completed   room organization consistent   Goal Outcome Evaluation:
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
## 2024-06-30 LAB
ANION GAP SERPL CALCULATED.3IONS-SCNC: 15 MMOL/L (ref 5–15)
BACTERIA BLD CULT: ABNORMAL
BASOPHILS # BLD MANUAL: 0 10*3/MM3 (ref 0–0.2)
BASOPHILS NFR BLD MANUAL: 0 % (ref 0–1.5)
BOTTLE TYPE: ABNORMAL
BUN SERPL-MCNC: 57 MG/DL (ref 8–23)
BUN/CREAT SERPL: 10.6 (ref 7–25)
CALCIUM SPEC-SCNC: 8.6 MG/DL (ref 8.6–10.5)
CHLORIDE SERPL-SCNC: 101 MMOL/L (ref 98–107)
CK SERPL-CCNC: 1233 U/L (ref 20–180)
CO2 SERPL-SCNC: 18 MMOL/L (ref 22–29)
CREAT SERPL-MCNC: 5.39 MG/DL (ref 0.57–1)
DEPRECATED RDW RBC AUTO: 55.9 FL (ref 37–54)
EGFRCR SERPLBLD CKD-EPI 2021: 8.2 ML/MIN/1.73
EOSINOPHIL # BLD MANUAL: 0 10*3/MM3 (ref 0–0.4)
EOSINOPHIL NFR BLD MANUAL: 0 % (ref 0.3–6.2)
ERYTHROCYTE [DISTWIDTH] IN BLOOD BY AUTOMATED COUNT: 16.1 % (ref 12.3–15.4)
GLUCOSE SERPL-MCNC: 99 MG/DL (ref 65–99)
HCT VFR BLD AUTO: 23.3 % (ref 34–46.6)
HGB BLD-MCNC: 7.3 G/DL (ref 12–15.9)
INR PPP: 1.67 (ref 0.89–1.12)
LYMPHOCYTES # BLD MANUAL: 1.11 10*3/MM3 (ref 0.7–3.1)
LYMPHOCYTES NFR BLD MANUAL: 7 % (ref 5–12)
MCH RBC QN AUTO: 29.7 PG (ref 26.6–33)
MCHC RBC AUTO-ENTMCNC: 31.3 G/DL (ref 31.5–35.7)
MCV RBC AUTO: 94.7 FL (ref 79–97)
MONOCYTES # BLD: 0.98 10*3/MM3 (ref 0.1–0.9)
MRSA DNA SPEC QL NAA+PROBE: NEGATIVE
NEUTROPHILS # BLD AUTO: 11.84 10*3/MM3 (ref 1.7–7)
NEUTROPHILS NFR BLD MANUAL: 61 % (ref 42.7–76)
NEUTS BAND NFR BLD MANUAL: 24 % (ref 0–5)
PLAT MORPH BLD: NORMAL
PLATELET # BLD AUTO: 114 10*3/MM3 (ref 140–450)
PMV BLD AUTO: 12.6 FL (ref 6–12)
POTASSIUM SERPL-SCNC: 3.7 MMOL/L (ref 3.5–5.2)
PROCALCITONIN SERPL-MCNC: 2.61 NG/ML (ref 0–0.25)
PROTHROMBIN TIME: 19.9 SECONDS (ref 12.2–14.5)
RBC # BLD AUTO: 2.46 10*6/MM3 (ref 3.77–5.28)
RBC MORPH BLD: NORMAL
SODIUM SERPL-SCNC: 134 MMOL/L (ref 136–145)
VARIANT LYMPHS NFR BLD MANUAL: 8 % (ref 19.6–45.3)
WBC MORPH BLD: NORMAL
WBC NRBC COR # BLD AUTO: 13.93 10*3/MM3 (ref 3.4–10.8)

## 2024-06-30 PROCEDURE — 25010000002 SODIUM CHLORIDE 0.9 % WITH KCL 20 MEQ 20-0.9 MEQ/L-% SOLUTION: Performed by: INTERNAL MEDICINE

## 2024-06-30 PROCEDURE — 85610 PROTHROMBIN TIME: CPT | Performed by: INTERNAL MEDICINE

## 2024-06-30 PROCEDURE — 85007 BL SMEAR W/DIFF WBC COUNT: CPT | Performed by: INTERNAL MEDICINE

## 2024-06-30 PROCEDURE — 84145 PROCALCITONIN (PCT): CPT | Performed by: INTERNAL MEDICINE

## 2024-06-30 PROCEDURE — 87641 MR-STAPH DNA AMP PROBE: CPT | Performed by: INTERNAL MEDICINE

## 2024-06-30 PROCEDURE — 25010000002 CEFTRIAXONE PER 250 MG: Performed by: INTERNAL MEDICINE

## 2024-06-30 PROCEDURE — 80048 BASIC METABOLIC PNL TOTAL CA: CPT | Performed by: INTERNAL MEDICINE

## 2024-06-30 PROCEDURE — 25010000002 HEPARIN (PORCINE) PER 1000 UNITS: Performed by: INTERNAL MEDICINE

## 2024-06-30 PROCEDURE — 82550 ASSAY OF CK (CPK): CPT | Performed by: NURSE PRACTITIONER

## 2024-06-30 PROCEDURE — 99232 SBSQ HOSP IP/OBS MODERATE 35: CPT | Performed by: INTERNAL MEDICINE

## 2024-06-30 PROCEDURE — 25810000003 LACTATED RINGERS PER 1000 ML: Performed by: INTERNAL MEDICINE

## 2024-06-30 PROCEDURE — 85025 COMPLETE CBC W/AUTO DIFF WBC: CPT | Performed by: INTERNAL MEDICINE

## 2024-06-30 RX ORDER — SODIUM CHLORIDE AND POTASSIUM CHLORIDE 150; 900 MG/100ML; MG/100ML
50 INJECTION, SOLUTION INTRAVENOUS CONTINUOUS
Status: DISCONTINUED | OUTPATIENT
Start: 2024-06-30 | End: 2024-07-02

## 2024-06-30 RX ORDER — SACCHAROMYCES BOULARDII 250 MG
250 CAPSULE ORAL DAILY
Status: DISCONTINUED | OUTPATIENT
Start: 2024-07-01 | End: 2024-06-30

## 2024-06-30 RX ADMIN — Medication 10 ML: at 21:49

## 2024-06-30 RX ADMIN — HEPARIN SODIUM 5000 UNITS: 5000 INJECTION INTRAVENOUS; SUBCUTANEOUS at 21:47

## 2024-06-30 RX ADMIN — ANASTROZOLE 1 MG: 1 TABLET ORAL at 08:43

## 2024-06-30 RX ADMIN — POTASSIUM CHLORIDE AND SODIUM CHLORIDE 50 ML/HR: 900; 150 INJECTION, SOLUTION INTRAVENOUS at 16:02

## 2024-06-30 RX ADMIN — PANTOPRAZOLE SODIUM 40 MG: 40 INJECTION, POWDER, FOR SOLUTION INTRAVENOUS at 08:43

## 2024-06-30 RX ADMIN — SODIUM CHLORIDE, POTASSIUM CHLORIDE, SODIUM LACTATE AND CALCIUM CHLORIDE 50 ML/HR: 600; 310; 30; 20 INJECTION, SOLUTION INTRAVENOUS at 11:42

## 2024-06-30 RX ADMIN — DIPHENHYDRAMINE HYDROCHLORIDE AND LIDOCAINE HYDROCHLORIDE AND ALUMINUM HYDROXIDE AND MAGNESIUM HYDRO 5 ML: KIT at 05:50

## 2024-06-30 RX ADMIN — DIPHENHYDRAMINE HYDROCHLORIDE AND LIDOCAINE HYDROCHLORIDE AND ALUMINUM HYDROXIDE AND MAGNESIUM HYDRO 5 ML: KIT at 14:02

## 2024-06-30 RX ADMIN — DIPHENHYDRAMINE HYDROCHLORIDE AND LIDOCAINE HYDROCHLORIDE AND ALUMINUM HYDROXIDE AND MAGNESIUM HYDRO 5 ML: KIT at 21:49

## 2024-06-30 RX ADMIN — TERAZOSIN HYDROCHLORIDE 2 MG: 2 CAPSULE ORAL at 21:49

## 2024-06-30 RX ADMIN — HEPARIN SODIUM 5000 UNITS: 5000 INJECTION INTRAVENOUS; SUBCUTANEOUS at 05:49

## 2024-06-30 RX ADMIN — Medication 10 ML: at 08:44

## 2024-06-30 RX ADMIN — FOLIC ACID 1 MG: 1 TABLET ORAL at 08:43

## 2024-06-30 RX ADMIN — NEBIVOLOL 2.5 MG: 2.5 TABLET ORAL at 08:43

## 2024-06-30 RX ADMIN — HEPARIN SODIUM 5000 UNITS: 5000 INJECTION INTRAVENOUS; SUBCUTANEOUS at 14:02

## 2024-06-30 RX ADMIN — SODIUM CHLORIDE 2000 MG: 900 INJECTION INTRAVENOUS at 14:02

## 2024-06-30 RX ADMIN — ROSUVASTATIN 10 MG: 10 TABLET, FILM COATED ORAL at 21:47

## 2024-06-30 NOTE — PROGRESS NOTES
Pharmacy to Dose: Daptomycin    Indication: Cellulitis     Documented weights    06/19/24 1315 06/26/24 1618   Weight: 128 kg (282 lb 3 oz) 128 kg (282 lb)     Estimated Creatinine Clearance: 12.6 mL/min (A) (by C-G formula based on SCr of 5.39 mg/dL (H)).      Starting Daptomycin at 300mg (~4mg/kg) Q48H based on renal adjustment dosing.    Thank you,  Gemma Arroyo, PharmD  06/30/24  14:03 EDT

## 2024-06-30 NOTE — CONSULTS
INFECTIOUS DISEASE CONSULT/INITIAL HOSPITAL VISIT    Moon Lerma  1955  [id number]    Date of Consult: 6/30/2024    Admission Date: 6/19/2024    Requesting Provider: Andrew  Evaluating Physician: Sivakumar Felton MD    Reason for Consultation: Fever    History of present illness:    Patient is a 68 y.o. female, with PMH breast cancer( now on hormone therapy) PE/DVT ( on Pradaxa)  HTN, chronic lymphedema, seen today for an Ecoli UTI. She is here from [place] visiting family, and developed shortness of breath prompting presentation to the ED.  She had been recently treated for RLE cellulitis with a course of oral antibiotics( Cefpodoxime and Augmentin ) in [place] prior to traveling with improvement.  She bit her tongue one week ago, causing an ulceration, then began to notice dark stools, fatigue. She had been seen by a new PCP day of admission, referred to the ED. Admitting labs with hgb 5.8, Scr 6.7, PT 82.1, WBC 12, proBNP 3437, UA TNTC WBC, urine culture with Ecoli, PCT 2.61. CXR without acute abnormality.  CT A/P without acute abdominopelvic findings. Renal US without evidence of hydronephrosis.  Recent cxr with small pleural effusions, and mild atelectasis.  Underwent EGD/colonoscopy 6/28 with mild gastritis, normal colonoscopy.  Temperature last pm 102.2 and we were consulted for evaluation and treatment.  Currently on Rocephin. She does complain of burning with urination. No cough, increased sputum production, nausea, abdominal pain, diarrhea.  Right leg is more red than baseline but left is currently at baseline.  Still with some dysuria.  Son at bedside assisted with translation.    Past Medical History:   Diagnosis Date    Breast cancer     Clotting disorder     DVT (deep venous thrombosis)     Hypertension        Past Surgical History:   Procedure Laterality Date    MASTECTOMY, PARTIAL      left breast       Family History   Problem Relation Age of Onset    No Known Problems Mother     Diabetes  Father        Social History     Socioeconomic History    Marital status:    Tobacco Use    Smoking status: Never    Smokeless tobacco: Never   Vaping Use    Vaping status: Never Used   Substance and Sexual Activity    Alcohol use: Not Currently    Drug use: Never    Sexual activity: Defer       No Known Allergies      Medication:    Current Facility-Administered Medications:     acetaminophen (TYLENOL) tablet 650 mg, 650 mg, Oral, Q6H PRN, Brunner, Mark I, MD, 650 mg at 06/29/24 2129    anastrozole (ARIMIDEX) tablet 1 mg, 1 mg, Oral, Daily, Viktor Morales MD, 1 mg at 06/30/24 0843    Biotene dry mouth liquid 15 mL, 15 mL, Swish & Spit, 5x Daily PRN, Brunner, Mark I, MD    sennosides-docusate (PERICOLACE) 8.6-50 MG per tablet 2 tablet, 2 tablet, Oral, BID PRN **AND** polyethylene glycol (MIRALAX) packet 17 g, 17 g, Oral, Daily PRN **AND** bisacodyl (DULCOLAX) EC tablet 5 mg, 5 mg, Oral, Daily PRN **AND** bisacodyl (DULCOLAX) suppository 10 mg, 10 mg, Rectal, Daily PRN, Brunner, Mark I, MD    cefTRIAXone (ROCEPHIN) 1,000 mg in sodium chloride 0.9 % 100 mL MBP, 1,000 mg, Intravenous, Q24H, Viktor Morales MD, Last Rate: 200 mL/hr at 06/29/24 1839, 1,000 mg at 06/29/24 1839    First Mouthwash (Magic Mouthwash) 5 mL, 5 mL, Swish & Spit, Q8H, Brunner, Mark I, MD, 5 mL at 06/30/24 0550    folic acid (FOLVITE) tablet 1 mg, 1 mg, Oral, Daily, Viktor Morales MD, 1 mg at 06/30/24 0843    heparin (porcine) 5000 UNIT/ML injection 5,000 Units, 5,000 Units, Subcutaneous, Q8H, Viktor Morales MD, 5,000 Units at 06/30/24 0549    lactated ringers infusion, 50 mL/hr, Intravenous, Continuous, Brunner, Mark I, MD, Last Rate: 50 mL/hr at 06/30/24 1142, 50 mL/hr at 06/30/24 1142    lactated ringers infusion, 50 mL/hr, Intravenous, Continuous, Viktor Morales MD    nebivolol (BYSTOLIC) tablet 2.5 mg, 2.5 mg, Oral, Q24H, Brunner, Mark I, MD, 2.5 mg at 06/30/24 0843    ondansetron (ZOFRAN) injection 4 mg, 4 mg, Intravenous,  Q6H PRN, Viktor Morales MD, 4 mg at 06/29/24 1532    oxymetazoline (AFRIN) nasal spray 2 spray, 2 spray, Each Nare, BID PRN, Brunner, Mark I, MD    pantoprazole (PROTONIX) injection 40 mg, 40 mg, Intravenous, QAM AC, Viktor Morales MD, 40 mg at 06/30/24 0843    rosuvastatin (CRESTOR) tablet 10 mg, 10 mg, Oral, Nightly, Brunner, Mark I, MD, 10 mg at 06/29/24 2051    sodium chloride 0.9 % flush 10 mL, 10 mL, Intravenous, PRN, Brunner, Mark I, MD    sodium chloride 0.9 % flush 10 mL, 10 mL, Intravenous, Q12H, Brunner, Mark I, MD, 10 mL at 06/30/24 0844    sodium chloride 0.9 % flush 10 mL, 10 mL, Intravenous, PRN, Brunner, Mark I, MD    sodium chloride 0.9 % infusion 40 mL, 40 mL, Intravenous, PRN, Brunner, Mark I, MD    sodium chloride nasal spray 1 spray, 1 spray, Each Nare, PRN, Brunner, Mark I, MD    terazosin (HYTRIN) capsule 2 mg, 2 mg, Oral, Nightly, Brunner, Mark I, MD, 2 mg at 06/29/24 2051    Antibiotics:  Anti-Infectives (From admission, onward)      Ordered     Dose/Rate Route Frequency Start Stop    06/29/24 1721  cefTRIAXone (ROCEPHIN) 1,000 mg in sodium chloride 0.9 % 100 mL MBP        Ordering Provider: Viktor Morales MD    1,000 mg  200 mL/hr over 30 Minutes Intravenous Every 24 Hours 06/29/24 1800 07/04/24 1759              Review of Systems:  Constitutional-- +  Fever, chills or sweats.  Appetite good, and no malaise.+ fatigue.  HEENT-- No new vision, hearing or throat complaints.  No epistaxis or oral sores.  Denies odynophagia or dysphagia. No headache, photophobia or neck stiffness.  CV-- No chest pain, palpitation or syncope  Resp-- No SOB/cough/Hemoptysis  GI- No nausea, vomiting, or diarrhea.  + melena, Denies jaundice or chronic liver disease.  --+ burning with urination no  hematuria, or flank pain..  Heme- No active bruising or bleeding; no Hx of DVT or PE.  MS-- no swelling or pain in the bones or joints of arms/legs.  No new back pain.  Neuro-- No acute focal weakness or numbness  in the arms or legs.  No seizures.  Skin--chronic venous stasis disease LE,       Physical Exam:   Vital Signs  Temp (24hrs), Av.7 °F (38.2 °C), Min:98.4 °F (36.9 °C), Max:102.2 °F (39 °C)    Temp  Min: 98.4 °F (36.9 °C)  Max: 102.2 °F (39 °C)  BP  Min: 119/78  Max: 144/89  Pulse  Min: 98  Max: 111  Resp  Min: 18  Max: 18  SpO2  Min: 97 %  Max: 97 %    GENERAL: Awake and alert, in no acute distress. Up in chair   HEENT: Normocephalic, atraumatic.  PERRL. EOMI. No conjunctival injection. No icterus. Oropharynx clear without evidence of thrush or exudate. No evidence of periodontal disease.    NECK: Supple  HEART: RRR; No murmur,   LUNGS: Clear to auscultation bilaterally without wheezing, rales, rhonchi. Normal respiratory effort. Nonlabored. No dullness.  ABDOMEN: obese. Soft, nontender, nondistended. Positive bowel sounds. No rebound or guarding.   EXT:  bilateral lower extremity swelling   :  Without Gonzalez catheter.  MSK: No joint effusions or erythema  SKIN: no diffuse rash. Faint erythema RLE. LLE with chronic venous stasis hyperpigmentation  NEURO: Oriented to PPT.  Motor 5/5 strength  PSYCHIATRIC: Normal insight and judgment. Cooperative with PE    Laboratory Data    Results from last 7 days   Lab Units 24  0828 24  0745 24  0740   WBC 10*3/mm3 13.93* 14.59* 5.90   HEMOGLOBIN g/dL 7.3* 7.4* 7.7*   HEMATOCRIT % 23.3* 22.7* 24.2*   PLATELETS 10*3/mm3 114* 126* 146     Results from last 7 days   Lab Units 24  0828   SODIUM mmol/L 134*   POTASSIUM mmol/L 3.7   CHLORIDE mmol/L 101   CO2 mmol/L 18.0*   BUN mg/dL 57*   CREATININE mg/dL 5.39*   GLUCOSE mg/dL 99   CALCIUM mg/dL 8.6                             Estimated Creatinine Clearance: 12.6 mL/min (A) (by C-G formula based on SCr of 5.39 mg/dL (H)).      Microbiology:  Microbiology Results (last 10 days)       Procedure Component Value - Date/Time    Urine Culture - Urine, Urine, Clean Catch [[id number]]  (Abnormal) Collected:  06/29/24 1534    Lab Status: Preliminary result Specimen: Urine, Clean Catch Updated: 06/30/24 1159     Urine Culture 50,000 CFU/mL Escherichia coli    Narrative:      Colonization of the urinary tract without infection is common. Treatment is discouraged unless the patient is symptomatic, pregnant, or undergoing an invasive urologic procedure.    Respiratory Panel PCR w/COVID-19(SARS-CoV-2) [facility]ARDO/BRYCE/RAKESH/PAD/COR/AURELIO In-House, NP Swab in UTM/VTM, 2 HR TAT - Swab, Nasopharynx [[id number]]  (Normal) Collected: 06/29/24 1206    Lab Status: Final result Specimen: Swab from Nasopharynx Updated: 06/29/24 1257     ADENOVIRUS, PCR Not Detected     Coronavirus 229E Not Detected     Coronavirus HKU1 Not Detected     Coronavirus NL63 Not Detected     Coronavirus OC43 Not Detected     COVID19 Not Detected     Human Metapneumovirus Not Detected     Human Rhinovirus/Enterovirus Not Detected     Influenza A PCR Not Detected     Influenza B PCR Not Detected     Parainfluenza Virus 1 Not Detected     Parainfluenza Virus 2 Not Detected     Parainfluenza Virus 3 Not Detected     Parainfluenza Virus 4 Not Detected     RSV, PCR Not Detected     Bordetella pertussis pcr Not Detected     Bordetella parapertussis PCR Not Detected     Chlamydophila pneumoniae PCR Not Detected     Mycoplasma pneumo by PCR Not Detected    Narrative:      In the setting of a positive respiratory panel with a viral infection PLUS a negative procalcitonin without other underlying concern for bacterial infection, consider observing off antibiotics or discontinuation of antibiotics and continue supportive care. If the respiratory panel is positive for atypical bacterial infection (Bordetella pertussis, Chlamydophila pneumoniae, or Mycoplasma pneumoniae), consider antibiotic de-escalation to target atypical bacterial infection.            Radiology:  Imaging Results (Last 72 Hours)       Procedure Component Value Units Date/Time    XR Chest 1 View [[id number]]  Collected: 06/29/24 1214     Updated: 06/29/24 1220    Narrative:      XR CHEST 1 VW    Date of Exam: 6/29/2024 11:13 AM EDT    Indication: fever, unclear source    Comparison: 6/19/2024    Findings:  Cardiomegaly. Small left greater than right pleural effusions with associated mild bibasilar opacities. No pneumothorax. No evidence of acute osseous abnormalities. Visualized upper abdomen is unremarkable.      Impression:      Impression:  Small left greater than right pleural effusions with associated mild bibasilar opacities.      Electronically Signed: Av Murguia MD    6/29/2024 12:17 PM EDT    Workstation ID: [id number]          I personally reviewed the above CXR: bilateral lower lobe opacities       Impression:   Fever, sepsis: likely due to UTI alone. RLE cellulitis could also be contributing. Blood cx pending   E coli UTI, sensitivities pending. Still with dysuria.   RLE cellulitis: In setting of chronic venous stasis dermatitis.  Some increased erythema on the right compared to baseline. No DVT on US  Chronic bilateral lower extremity venous stasis dermatitis and hyperpigmentation  3.    GI bleed, normal colonoscopy, mild gastritis by EGD   3.    Breast cancer s/p right mastectomy now on hormone therapy  4.    Morbid obesity, BMI 55  5.    DVT/PTE on Pradaxa     PLAN/RECOMMENDATIONS:   - blood cultures x 2  - f/u pending E coli TRISHA data  - MRSA nares screen  - Follow CBC, CMP. Check CRP    Continue Ceftriaxone, but increase to 2 g IV daily     Discussed with patient and son at bedside. I will follow.     Dr. Felton has obtained the history, performed the PE, formulated the above treatment plan     VINITA Voss  6/30/2024  12:20 EDT    I have edited this note to reflect my history, exam, assessment and plan. I alone have made the medical decision making for this complex patient.   Sivakumar Felton MD
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
## 2024-06-30 NOTE — PROGRESS NOTES
Patient feels well today.  She has no further GI bleeding.  Hemoglobin stable.  Presumably, bleeding was from biting her tongue in the setting of supratherapeutic anticoagulation.    Please call with questions or concerns.

## 2024-06-30 NOTE — PLAN OF CARE
Goal Outcome Evaluation:              Outcome Evaluation: Pt aox 4, fever treated per MAR, no c/o pain, will CTM the rest of my shift

## 2024-06-30 NOTE — PLAN OF CARE
Goal Outcome Evaluation:         Pt resting in bed. RA. 4x oriented. 1x assist with walker. Pt received a bed bath today. Pt on 50ml/hr fluids with KCI 20mEql. Pt refused prevented heel dressing and boots.safety precautions utilized and maintained.

## 2024-06-30 NOTE — PROGRESS NOTES
" LOS: 11 days   Patient Care Team:  Carmen Singh PA-C as PCP - General (Physician Assistant)    Chief Complaint: Acute kidney injury anemia, GI bleed        Subjective   Fever is better today.  Infectious disease on  Appetite slightly better.  IV line is running.  Underwent EGD and colonoscopy few days back. Mild gastritis noted.         History taken from: patient  No new complaints, eating better    Objective     Vital Sign Min/Max for last 24 hours  Temp  Min: 98.4 °F (36.9 °C)  Max: 102.2 °F (39 °C)   BP  Min: 119/78  Max: 144/89   Pulse  Min: 98  Max: 111   Resp  Min: 18  Max: 18   SpO2  Min: 97 %  Max: 97 %   Flow (L/min)  Min: 4  Max: 4   No data recorded     Flowsheet Rows      Flowsheet Row First Filed Value   Admission Height 152.4 cm (60\") Documented at 06/19/2024 1315   Admission Weight 128 kg (282 lb 3 oz) Documented at 06/19/2024 1315            No intake/output data recorded.  No intake/output data recorded.    Vitals:    06/29/24 2245 06/30/24 0002 06/30/24 0530 06/30/24 0843   BP:  119/78  138/99   BP Location:  Left arm  Left arm   Patient Position:  Lying  Sitting   Pulse:   98    Resp:  18     Temp: (!) 101.5 °F (38.6 °C) 99.7 °F (37.6 °C) 98.4 °F (36.9 °C) 100 °F (37.8 °C)   TempSrc: Oral Oral Oral Oral   SpO2:   97%    Weight:       Height:         General Appearance: Alert patient female morbidly obese   Eyes:   Neck: Supple   Lungs: equal chest movement,   Heart:    Abdomen:  morbid obesity  Extremities: Pitting and nonpitting edema  Neuro: No focal deficit,     Discussed with the daughter      Results Review:     I reviewed the patient's new clinical results.    WBC WBC   Date Value Ref Range Status   06/30/2024 13.93 (H) 3.40 - 10.80 10*3/mm3 Final   06/29/2024 14.59 (H) 3.40 - 10.80 10*3/mm3 Final   06/28/2024 5.90 3.40 - 10.80 10*3/mm3 Final      HGB Hemoglobin   Date Value Ref Range Status   06/30/2024 7.3 (L) 12.0 - 15.9 g/dL Final   06/29/2024 7.4 (L) 12.0 - 15.9 g/dL Final " "  06/28/2024 7.7 (L) 12.0 - 15.9 g/dL Final      HCT Hematocrit   Date Value Ref Range Status   06/30/2024 23.3 (L) 34.0 - 46.6 % Final   06/29/2024 22.7 (L) 34.0 - 46.6 % Final   06/28/2024 24.2 (L) 34.0 - 46.6 % Final      Platlets No results found for: \"LABPLAT\"   MCV MCV   Date Value Ref Range Status   06/30/2024 94.7 79.0 - 97.0 fL Final   06/29/2024 92.3 79.0 - 97.0 fL Final   06/28/2024 93.1 79.0 - 97.0 fL Final          Sodium Sodium   Date Value Ref Range Status   06/30/2024 134 (L) 136 - 145 mmol/L Final   06/29/2024 138 136 - 145 mmol/L Final   06/28/2024 139 136 - 145 mmol/L Final      Potassium Potassium   Date Value Ref Range Status   06/30/2024 3.7 3.5 - 5.2 mmol/L Final   06/29/2024 3.6 3.5 - 5.2 mmol/L Final   06/28/2024 3.7 3.5 - 5.2 mmol/L Final      Chloride Chloride   Date Value Ref Range Status   06/30/2024 101 98 - 107 mmol/L Final   06/29/2024 105 98 - 107 mmol/L Final   06/28/2024 104 98 - 107 mmol/L Final      CO2 CO2   Date Value Ref Range Status   06/30/2024 18.0 (L) 22.0 - 29.0 mmol/L Final   06/29/2024 18.0 (L) 22.0 - 29.0 mmol/L Final   06/28/2024 20.0 (L) 22.0 - 29.0 mmol/L Final      BUN BUN   Date Value Ref Range Status   06/30/2024 57 (H) 8 - 23 mg/dL Final   06/29/2024 55 (H) 8 - 23 mg/dL Final   06/28/2024 61 (H) 8 - 23 mg/dL Final      Creatinine Creatinine   Date Value Ref Range Status   06/30/2024 5.39 (H) 0.57 - 1.00 mg/dL Final   06/29/2024 5.10 (H) 0.57 - 1.00 mg/dL Final   06/28/2024 5.12 (H) 0.57 - 1.00 mg/dL Final      Calcium Calcium   Date Value Ref Range Status   06/30/2024 8.6 8.6 - 10.5 mg/dL Final   06/29/2024 8.8 8.6 - 10.5 mg/dL Final   06/28/2024 9.1 8.6 - 10.5 mg/dL Final      PO4 No results found for: \"CAPO4\"   Albumin No results found for: \"ALBUMIN\"     Magnesium No results found for: \"MG\"     Uric Acid No results found for: \"URICACID\"     Medication Review: Yes    Assessment & Plan       Renal failure    Acute blood loss anemia    Hyperkalemia    Acute upper " GI bleeding    Chronic deep vein thrombosis (DVT)    Primary hypertension    Chronic acquired lymphedema    Chronic cutaneous venous stasis ulcer    Acute GI bleeding    Results from last 7 days   Lab Units 06/30/24  0828 06/29/24  0745 06/28/24  0740 06/27/24  1008   SODIUM mmol/L 134* 138 139 140   POTASSIUM mmol/L 3.7 3.6 3.7 4.0   CHLORIDE mmol/L 101 105 104 106   CO2 mmol/L 18.0* 18.0* 20.0* 24.0   BUN mg/dL 57* 55* 61* 64*   CREATININE mg/dL 5.39* 5.10* 5.12* 5.31*   CALCIUM mg/dL 8.6 8.8 9.1 9.0   WBC 10*3/mm3 13.93* 14.59* 5.90 5.83   HEMOGLOBIN g/dL 7.3* 7.4* 7.7* 7.6*   PLATELETS 10*3/mm3 114* 126* 146 129*     No intake or output data in the 24 hours ending 06/30/24 1432    Assessment and plan:         1.  Acute kidney injury: Last known stable cr ~ 1.4mg/dl. Cr 6.7 mg/dl on admission. UA large alison esterase+ trace protein blood moderate ( Not a clean catch) UACR - 80.9. Non oliguric - DDx - Pre-quinten/ATN vs AIN - improving. ANCA serology negative except for borderline + atypical ANCA ( non specific).     2.  Hyperkalemia: In the setting of WALTER:      3.  Met acidosis: Due to WALTER    4.  Severe anemia: FOBT+. On AC. S/p EGD and colonoscopy 6/28/24.      5.  DANIEL: Likely due to severe anemia     6.  Hx of breast cancer: On anastrozole     7.  Supra-therapeutic INR: On dabigatran. INR improving.     Recommendation:    -Serum sodium 134 will change lactated Ringer to normal saline with potassium.  Conservative care from renal standpoint.  Extensive discussion with family. If renal function continues to improve renal bx may not change the management at this stage.     Continue with current plan w outpatient f/u in 2 weeks after discharge. Patient would like to f/u with Dr. Deutsch in the office.         Adolfo Reynoso MD  06/30/24  14:32 EDT

## 2024-07-01 LAB
ALBUMIN SERPL-MCNC: 3.1 G/DL (ref 3.5–5.2)
ALP SERPL-CCNC: 54 U/L (ref 39–117)
ALT SERPL W P-5'-P-CCNC: 17 U/L (ref 1–33)
ANION GAP SERPL CALCULATED.3IONS-SCNC: 16 MMOL/L (ref 5–15)
AST SERPL-CCNC: 46 U/L (ref 1–32)
BACTERIA SPEC AEROBE CULT: ABNORMAL
BILIRUB CONJ SERPL-MCNC: 0.2 MG/DL (ref 0–0.3)
BILIRUB INDIRECT SERPL-MCNC: 0.2 MG/DL
BILIRUB SERPL-MCNC: 0.4 MG/DL (ref 0–1.2)
BUN SERPL-MCNC: 55 MG/DL (ref 8–23)
BUN/CREAT SERPL: 10.5 (ref 7–25)
CALCIUM SPEC-SCNC: 7.8 MG/DL (ref 8.6–10.5)
CHLORIDE SERPL-SCNC: 103 MMOL/L (ref 98–107)
CO2 SERPL-SCNC: 17 MMOL/L (ref 22–29)
CREAT SERPL-MCNC: 5.24 MG/DL (ref 0.57–1)
CRP SERPL-MCNC: 15.58 MG/DL (ref 0–0.5)
DEPRECATED RDW RBC AUTO: 56.8 FL (ref 37–54)
EGFRCR SERPLBLD CKD-EPI 2021: 8.5 ML/MIN/1.73
ERYTHROCYTE [DISTWIDTH] IN BLOOD BY AUTOMATED COUNT: 16 % (ref 12.3–15.4)
GLUCOSE SERPL-MCNC: 160 MG/DL (ref 65–99)
HCT VFR BLD AUTO: 22.9 % (ref 34–46.6)
HGB BLD-MCNC: 7.2 G/DL (ref 12–15.9)
INR PPP: 1.43 (ref 0.89–1.12)
MCH RBC QN AUTO: 30 PG (ref 26.6–33)
MCHC RBC AUTO-ENTMCNC: 31.4 G/DL (ref 31.5–35.7)
MCV RBC AUTO: 95.4 FL (ref 79–97)
PLATELET # BLD AUTO: 115 10*3/MM3 (ref 140–450)
PMV BLD AUTO: 12.3 FL (ref 6–12)
POTASSIUM SERPL-SCNC: 3.9 MMOL/L (ref 3.5–5.2)
PROT SERPL-MCNC: 6.3 G/DL (ref 6–8.5)
PROTHROMBIN TIME: 17.5 SECONDS (ref 12.2–14.5)
RBC # BLD AUTO: 2.4 10*6/MM3 (ref 3.77–5.28)
SODIUM SERPL-SCNC: 136 MMOL/L (ref 136–145)
WBC NRBC COR # BLD AUTO: 12.24 10*3/MM3 (ref 3.4–10.8)

## 2024-07-01 PROCEDURE — 25010000002 CEFTRIAXONE PER 250 MG: Performed by: INTERNAL MEDICINE

## 2024-07-01 PROCEDURE — 97530 THERAPEUTIC ACTIVITIES: CPT

## 2024-07-01 PROCEDURE — 80048 BASIC METABOLIC PNL TOTAL CA: CPT | Performed by: INTERNAL MEDICINE

## 2024-07-01 PROCEDURE — 99232 SBSQ HOSP IP/OBS MODERATE 35: CPT | Performed by: INTERNAL MEDICINE

## 2024-07-01 PROCEDURE — 85027 COMPLETE CBC AUTOMATED: CPT | Performed by: INTERNAL MEDICINE

## 2024-07-01 PROCEDURE — 80076 HEPATIC FUNCTION PANEL: CPT | Performed by: INTERNAL MEDICINE

## 2024-07-01 PROCEDURE — 85610 PROTHROMBIN TIME: CPT | Performed by: INTERNAL MEDICINE

## 2024-07-01 PROCEDURE — 25010000002 HEPARIN (PORCINE) PER 1000 UNITS: Performed by: INTERNAL MEDICINE

## 2024-07-01 PROCEDURE — 86140 C-REACTIVE PROTEIN: CPT | Performed by: INTERNAL MEDICINE

## 2024-07-01 PROCEDURE — 25010000002 SODIUM CHLORIDE 0.9 % WITH KCL 20 MEQ 20-0.9 MEQ/L-% SOLUTION: Performed by: INTERNAL MEDICINE

## 2024-07-01 RX ADMIN — DIPHENHYDRAMINE HYDROCHLORIDE AND LIDOCAINE HYDROCHLORIDE AND ALUMINUM HYDROXIDE AND MAGNESIUM HYDRO 5 ML: KIT at 14:23

## 2024-07-01 RX ADMIN — HEPARIN SODIUM 5000 UNITS: 5000 INJECTION INTRAVENOUS; SUBCUTANEOUS at 05:42

## 2024-07-01 RX ADMIN — APIXABAN 2.5 MG: 2.5 TABLET, FILM COATED ORAL at 21:58

## 2024-07-01 RX ADMIN — TERAZOSIN HYDROCHLORIDE 2 MG: 2 CAPSULE ORAL at 21:58

## 2024-07-01 RX ADMIN — Medication 10 ML: at 22:20

## 2024-07-01 RX ADMIN — Medication 10 ML: at 08:17

## 2024-07-01 RX ADMIN — SODIUM CHLORIDE 1000 MG: 900 INJECTION INTRAVENOUS at 14:22

## 2024-07-01 RX ADMIN — POTASSIUM CHLORIDE AND SODIUM CHLORIDE 50 ML/HR: 900; 150 INJECTION, SOLUTION INTRAVENOUS at 17:33

## 2024-07-01 RX ADMIN — DIPHENHYDRAMINE HYDROCHLORIDE AND LIDOCAINE HYDROCHLORIDE AND ALUMINUM HYDROXIDE AND MAGNESIUM HYDRO 5 ML: KIT at 05:42

## 2024-07-01 RX ADMIN — ROSUVASTATIN 10 MG: 10 TABLET, FILM COATED ORAL at 21:59

## 2024-07-01 RX ADMIN — DIPHENHYDRAMINE HYDROCHLORIDE AND LIDOCAINE HYDROCHLORIDE AND ALUMINUM HYDROXIDE AND MAGNESIUM HYDRO 5 ML: KIT at 21:59

## 2024-07-01 RX ADMIN — PANTOPRAZOLE SODIUM 40 MG: 40 INJECTION, POWDER, FOR SOLUTION INTRAVENOUS at 08:17

## 2024-07-01 RX ADMIN — FOLIC ACID 1 MG: 1 TABLET ORAL at 08:17

## 2024-07-01 RX ADMIN — HEPARIN SODIUM 5000 UNITS: 5000 INJECTION INTRAVENOUS; SUBCUTANEOUS at 14:23

## 2024-07-01 NOTE — PLAN OF CARE
Goal Outcome Evaluation:  Plan of Care Reviewed With: patient, family        Progress: no change  Outcome Evaluation: Progress report completed. Goals reviewed and revised as appropriate to measure functional progress. Pt ambulated 50ft with CGA and RW for support, 3 standing rest breaks required to improve fatigue. Continue to progress per pt tolerance.      Anticipated Discharge Disposition (PT): home with assist, home with home health

## 2024-07-01 NOTE — CASE MANAGEMENT/SOCIAL WORK
Continued Stay Note  [facility]     Patient Name: Moon Lerma  MRN: [id number]  Today's Date: 7/1/2024    Admit Date: 6/19/2024    Plan: update   Discharge Plan       Row Name 07/01/24 1431       Plan    Plan Comments Patient's plan is home with family when medically ready. Per MDR, patient will need to start Eliquis and be monitored for bleeding before patient can be discharged. CM will continue to follow.    Final Discharge Disposition Code 01 - home or self-care                   Discharge Codes    No documentation.                 Expected Discharge Date and Time       Expected Discharge Date Expected Discharge Time    Jun 30, 2024               Afia Roy RN

## 2024-07-01 NOTE — THERAPY PROGRESS REPORT/RE-CERT
Patient Name: Moon Lerma  : 1955    MRN: [id number]                              Today's Date: 2024       Admit Date: 2024    Visit Dx:     ICD-10-CM ICD-9-CM   1. Acute blood loss anemia  D62 285.1   2. Acute renal failure, unspecified acute renal failure type  N17.9 584.9   3. Acute hyperkalemia  E87.5 276.7   4. Acute GI bleeding  K92.2 578.9   5. Gastritis  K29.70 535.50     Patient Active Problem List   Diagnosis    Renal failure    Acute blood loss anemia    Hyperkalemia    Acute upper GI bleeding    Chronic deep vein thrombosis (DVT)    Primary hypertension    Chronic acquired lymphedema    Chronic cutaneous venous stasis ulcer    Acute GI bleeding     Past Medical History:   Diagnosis Date    Breast cancer     Clotting disorder     DVT (deep venous thrombosis)     Hypertension      Past Surgical History:   Procedure Laterality Date    MASTECTOMY, PARTIAL      left breast      General Information       Row Name 24 1121          Physical Therapy Time and Intention    Document Type therapy note (daily note)  -AE     Mode of Treatment physical therapy  -AE       Row Name 24 1121          General Information    Patient Profile Reviewed yes  -AE     Existing Precautions/Restrictions fall;other (see comments)  Requires , patient prefers family member to interpret  -AE     Barriers to Rehab language barrier;previous functional deficit  -AE       Row Name 24 1121          Cognition    Orientation Status (Cognition) oriented x 3  -AE       Row Name 24 1121          Safety Issues, Functional Mobility    Safety Issues Affecting Function (Mobility) insight into deficits/self-awareness;safety precaution awareness;safety precautions follow-through/compliance;sequencing abilities  -AE     Impairments Affecting Function (Mobility) balance;endurance/activity tolerance;strength;shortness of breath  -AE               User Key  (r) = Recorded By, (t) = Taken By, (c) =  Cosigned By      Initials Name Provider Type    AE Sai Fuentes PT Physical Therapist                   Mobility       Row Name 07/01/24 1124          Bed Mobility    Comment, (Bed Mobility) Pt received and left UIC.  -AE       Row Name 07/01/24 1124          Transfers    Comment, (Transfers) VCs for hand placement and sequencing. Pt required increased assist for STS from chair this session.  -AE       Row Name 07/01/24 1124          Sit-Stand Transfer    Sit-Stand [place] (Transfers) minimum assist (75% patient effort);verbal cues;2 person assist  -AE     Assistive Device (Sit-Stand Transfers) walker, front-wheeled  -AE       Row Name 07/01/24 1124          Gait/Stairs (Locomotion)    [place] Level (Gait) contact guard;1 person assist;verbal cues  -AE     Assistive Device (Gait) walker, front-wheeled  -AE     Distance in Feet (Gait) 50  -AE     Deviations/Abnormal Patterns (Gait) base of support, wide;shelly decreased;gait speed decreased;bilateral deviations  -AE     Bilateral Gait Deviations heel strike decreased  -AE     Comment, (Gait/Stairs) Pt demo step to gait pattern with slowed shelly, wide YOHAN, and decreased gait speed. Pt demo lateral sway while ambulating. Pt required 3 standing rest breaks this session to improve SOA and fatigue.  -AE               User Key  (r) = Recorded By, (t) = Taken By, (c) = Cosigned By      Initials Name Provider Type    Sai Quintanilla PT Physical Therapist                   Obj/Interventions       Row Name 07/01/24 1132          Range of Motion Comprehensive    General Range of Motion lower extremity range of motion deficits identified  -AE     Comment, General Range of Motion Decreased active hip flexion  -AE       Row Name 07/01/24 1132          Strength Comprehensive (MMT)    General Manual Muscle Testing (MMT) Assessment lower extremity strength deficits identified  -AE     Comment, General Manual Muscle Testing (MMT) Assessment BLE hip flexion 3-/5;  knee extension 3/5  -AE       Row Name 07/01/24 1132          Motor Skills    Therapeutic Exercise knee;hip;ankle  -AE       Row Name 07/01/24 1132          Hip (Therapeutic Exercise)    Hip (Therapeutic Exercise) isometric exercises;strengthening exercise  -AE     Hip Isometrics (Therapeutic Exercise) bilateral;gluteal sets;5 repetitions  -AE     Hip Strengthening (Therapeutic Exercise) bilateral;marching while seated;10 repetitions  -AE       Row Name 07/01/24 1132          Knee (Therapeutic Exercise)    Knee (Therapeutic Exercise) AROM (active range of motion)  -AE     Knee AROM (Therapeutic Exercise) bilateral;LAQ (long arc quad);5 repetitions;sitting  -AE       Row Name 07/01/24 1132          Ankle (Therapeutic Exercise)    Ankle (Therapeutic Exercise) AROM (active range of motion)  -AE     Ankle AROM (Therapeutic Exercise) bilateral;dorsiflexion;plantarflexion;10 repetitions  -AE       Row Name 07/01/24 1132          Balance    Balance Assessment sitting static balance;sitting dynamic balance;sit to stand dynamic balance;standing static balance;standing dynamic balance  -AE     Static Sitting Balance standby assist  -AE     Dynamic Sitting Balance standby assist  -AE     Position, Sitting Balance unsupported;sitting in chair  -AE     Sit to Stand Dynamic Balance minimal assist;2-person assist;verbal cues  -AE     Static Standing Balance contact guard  -AE     Dynamic Standing Balance contact guard  -AE     Position/Device Used, Standing Balance supported;walker, front-wheeled  -AE               User Key  (r) = Recorded By, (t) = Taken By, (c) = Cosigned By      Initials Name Provider Type    AE Sai Fuentes, PT Physical Therapist                   Goals/Plan       Row Name 07/01/24 1141          Bed Mobility Goal 1 (PT)    Activity/Assistive Device (Bed Mobility Goal 1, PT) sit to supine/supine to sit  -AE     [place] Level/Cues Needed (Bed Mobility Goal 1, PT) contact guard required  -AE     Time  Frame (Bed Mobility Goal 1, PT) short term goal (STG);5 days  -AE     Progress/Outcomes (Bed Mobility Goal 1, PT) goal revised this date;progress slower than expected  -AE       Row Name 07/01/24 1141          Transfer Goal 1 (PT)    Activity/Assistive Device (Transfer Goal 1, PT) sit-to-stand/stand-to-sit;bed-to-chair/chair-to-bed  -AE     [place] Level/Cues Needed (Transfer Goal 1, PT) standby assist  -AE     Time Frame (Transfer Goal 1, PT) long term goal (LTG);10 days  -AE     Progress/Outcome (Transfer Goal 1, PT) goal revised this date;progress slower than expected;continuing progress toward goal  -AE       Row Name 07/01/24 1141          Gait Training Goal 1 (PT)    Activity/Assistive Device (Gait Training Goal 1, PT) gait (walking locomotion);assistive device use  -AE     [place] Level (Gait Training Goal 1, PT) standby assist  -AE     Distance (Gait Training Goal 1, PT) 150ft  -AE     Time Frame (Gait Training Goal 1, PT) long term goal (LTG);10 days  -AE     Progress/Outcome (Gait Training Goal 1, PT) goal revised this date;progress slower than expected  -AE               User Key  (r) = Recorded By, (t) = Taken By, (c) = Cosigned By      Initials Name Provider Type    AE Sai Fuentes, PT Physical Therapist                   Clinical Impression       Row Name 07/01/24 1137          Pain    Pretreatment Pain Rating 0/10 - no pain  -AE     Posttreatment Pain Rating 0/10 - no pain  -AE       Row Name 07/01/24 1137          Plan of Care Review    Plan of Care Reviewed With patient;family  -AE     Progress no change  -AE     Outcome Evaluation Progress report completed. Goals reviewed and revised as appropriate to measure functional progress. Pt ambulated 50ft with CGA and RW for support, 3 standing rest breaks required to improve fatigue. Continue to progress per pt tolerance.  -AE       Row Name 07/01/24 1137          Vital Signs    Pre Systolic BP Rehab 99  -AE     Pre Treatment Diastolic BP 51   -AE     Pre SpO2 (%) 99  -AE     O2 Delivery Pre Treatment room air  -AE     Intra SpO2 (%) 100  -AE     O2 Delivery Intra Treatment room air  -AE     Post SpO2 (%) 97  -AE     O2 Delivery Post Treatment room air  -AE     Pre Patient Position Sitting  -AE     Intra Patient Position Standing  -AE     Post Patient Position Sitting  -AE       Row Name 07/01/24 1137          Positioning and Restraints    Pre-Treatment Position sitting in chair/recliner  -AE     Post Treatment Position chair  -AE     In Chair notified nsg;sitting;call light within reach;encouraged to call for assist;with family/caregiver  -AE               User Key  (r) = Recorded By, (t) = Taken By, (c) = Cosigned By      Initials Name Provider Type    AE Sai Fuentes PT Physical Therapist                   Outcome Measures       Row Name 07/01/24 1150          How much help from another person do you currently need...    Turning from your back to your side while in flat bed without using bedrails? 3  -AE     Moving from lying on back to sitting on the side of a flat bed without bedrails? 3  -AE     Moving to and from a bed to a chair (including a wheelchair)? 3  -AE     Standing up from a chair using your arms (e.g., wheelchair, bedside chair)? 3  -AE     Climbing 3-5 steps with a railing? 2  -AE     To walk in hospital room? 3  -AE     AM-PAC 6 Clicks Score (PT) 17  -AE     Highest Level of Mobility Goal 5 --> Static standing  -AE       Row Name 07/01/24 1150          Functional Assessment    Outcome Measure Options AM-PAC 6 Clicks Basic Mobility (PT)  -AE               User Key  (r) = Recorded By, (t) = Taken By, (c) = Cosigned By      Initials Name Provider Type    Sai Quintanilla PT Physical Therapist                                 Physical Therapy Education       Title: PT OT SLP Therapies (Done)       Topic: Physical Therapy (Done)       Point: Mobility training (Done)       Learning Progress Summary             Patient Acceptance, E,  VU by AE at 7/1/2024 1001    Acceptance, E, VU by SD at 6/25/2024 1123    Acceptance, E,I, VU by DV at 6/24/2024 1807    Acceptance, E, NR by AS at 6/23/2024 1038    Acceptance, E, NR by LM at 6/20/2024 1049   Family Acceptance, E, VU by SD at 6/25/2024 1123    Acceptance, E,I, VU by DV at 6/24/2024 1807                         Point: Home exercise program (Done)       Learning Progress Summary             Patient Acceptance, E, VU by AE at 7/1/2024 1001    Acceptance, E, VU by SD at 6/25/2024 1123    Acceptance, E,I, VU by DV at 6/24/2024 1807    Acceptance, E, NR by AS at 6/23/2024 1038   Family Acceptance, E, VU by SD at 6/25/2024 1123    Acceptance, E,I, VU by DV at 6/24/2024 1807                         Point: Body mechanics (Done)       Learning Progress Summary             Patient Acceptance, E, VU by AE at 7/1/2024 1001    Acceptance, E, VU by SD at 6/25/2024 1123    Acceptance, E,I, VU by DV at 6/24/2024 1807    Acceptance, E, NR by AS at 6/23/2024 1038   Family Acceptance, E, VU by SD at 6/25/2024 1123    Acceptance, E,I, VU by DV at 6/24/2024 1807                         Point: Precautions (Done)       Learning Progress Summary             Patient Acceptance, E, VU by AE at 7/1/2024 1001    Acceptance, E, VU by SD at 6/25/2024 1123    Acceptance, E,I, VU by DV at 6/24/2024 1807    Acceptance, E, NR by AS at 6/23/2024 1038    Acceptance, E, NR by LM at 6/20/2024 1049   Family Acceptance, E, VU by SD at 6/25/2024 1123    Acceptance, E,I, VU by DV at 6/24/2024 1807                                         User Key       Initials Effective Dates Name Provider Type Discipline    SD 03/13/23 -  Marbella Espinoza, PT Physical Therapist PT    AS 04/28/23 -  Luiza Blackburn, PTA Physical Therapist Assistant PT    LM 07/11/23 -  Carrie Diaz, PT Physical Therapist PT    AE 09/21/21 -  Sai Fuentes, PT Physical Therapist PT    DV 01/12/24 -  Sarahy Schultz, RN Registered Nurse Nurse                  PT  Recommendation and Plan     Plan of Care Reviewed With: patient, family  Progress: no change  Outcome Evaluation: Progress report completed. Goals reviewed and revised as appropriate to measure functional progress. Pt ambulated 50ft with CGA and RW for support, 3 standing rest breaks required to improve fatigue. Continue to progress per pt tolerance.     Time Calculation:         PT Charges       Row Name 07/01/24 1151             Time Calculation    Start Time 1001  -AE      PT Received On 07/01/24  -AE      PT Goal Re-Cert Due Date 07/11/24  -AE         Time Calculation- PT    Total Timed Code Minutes- PT 24 minute(s)  -AE         Timed Charges    41238 - PT Therapeutic Activity Minutes 24  -AE         Total Minutes    Timed Charges Total Minutes 24  -AE       Total Minutes 24  -AE                User Key  (r) = Recorded By, (t) = Taken By, (c) = Cosigned By      Initials Name Provider Type    AE Sai Fuentes, PT Physical Therapist                  Therapy Charges for Today       Code Description Service Date Service Provider Modifiers Qty    33240221767 HC PT THERAPEUTIC ACT EA 15 MIN 7/1/2024 Sai Fuentes, PT GP 2            PT G-Codes  Outcome Measure Options: AM-PAC 6 Clicks Basic Mobility (PT)  AM-PAC 6 Clicks Score (PT): 17  AM-PAC 6 Clicks Score (OT): 16  PT Discharge Summary  Anticipated Discharge Disposition (PT): home with assist, home with home health    Sai Fuentes PT  7/1/2024

## 2024-07-01 NOTE — NURSING NOTE
"Reason for Wound, Ostomy and Continence (WOC) Nursing Consultation: \"skin tears on posterior right thigh \"    Patient sitting in chair.  Family/support person attentive, present.     Wound Assessment  Wound Type: Skin Tear ISTAP-type 3  Location: Posterior R thigh  Wound Bed: pink and red  Periwound Skin: intact   Drainage Characteristics/Odor: sanguineous  Drainage Amount: scant  Pain: Yes   Care provided: Cleansed with NS, re-approximated skin flap over wound bed where able. Applied multilayer xeroform and folded over to provide extra moisture and prevent drying. Sprayed barrier spray to skin to protect skin. Optifoam applied to anchor.   Notes: Pt reportedly scoots forward on posterior thighs to mobilize in and out of chair. She does not like to lie in the bed and does not use chair waffle cushion. Discussed refusal of care form with nurse. BLE are quite swollen, support person in room states that patient cannot wear wraps because they are uncomfortable.   Wound Image:       Recommendation(s) for management of wound:   -Refer to wound care orders for specific instructions on how to treat/manage wound.  -Cleanse with NS, apply multilayer xeroform, spray barrier spray and allow to dry. Apply Optifoam. Change Q3 days.  -Practice pressure injury prevention protocol.    Most recent Vargas Scale score:  Sensory Perception: 3-->slightly limited  Moisture: 3-->occasionally moist  Activity: 3-->walks occasionally  Mobility: 3-->slightly limited  Nutrition: 3-->adequate  Friction and Shear: 2-->potential problem  Vargas Score: 17 (07/01/24 0800)     Specialty support surface: Foam Mattress with Waffle Overlay       Pressure Injury Prevention Protocol (initiate for Vargas Score of 18 or less):   *Keep skin dry, turn q 2 hr, keep heels elevated and offloaded with offloading heel boots.    *Apply z-guard to bottom and bony prominences daily and as needed with incontinence episodes.  *Follow C.A.R.E protocol if medical devices " (Bipap, brooks, Ng tube, etc) are being used.  *Reduce layers under patient (one sheet as drawsheet and two incontinence pads) to allow waffle or TANA to improve microclimate   *Clean skin gently with no-rinse PH-balanced cleanser and soft, disposable cloth (barrier wipes-blue pack).   *Raise knee-gatch before elevating HOB to reduce shearing      WOC Team will sign off.  Please re-consult if the wound(s) worsens.

## 2024-07-01 NOTE — PROGRESS NOTES
Moon Lerma  1955  [id number]    Date of Consult: 7/1/2024  Admission Date: 6/19/2024    Reason for Consultation: Fever    History of present illness:    Patient is a 68 y.o. female, with PMH breast cancer( now on hormone therapy) PE/DVT ( on Pradaxa)  HTN, chronic lymphedema, seen today for an Ecoli UTI. She is here from [place] visiting family, and developed shortness of breath prompting presentation to the ED.  She had been recently treated for RLE cellulitis with a course of oral antibiotics( Cefpodoxime and Augmentin ) in [place] prior to traveling with improvement.  She bit her tongue one week ago, causing an ulceration, then began to notice dark stools, fatigue. She had been seen by a new PCP day of admission, referred to the ED. Admitting labs with hgb 5.8, Scr 6.7, PT 82.1, WBC 12, proBNP 3437, UA TNTC WBC, urine culture with Ecoli, PCT 2.61. CXR without acute abnormality.  CT A/P without acute abdominopelvic findings. Renal US without evidence of hydronephrosis.  Recent cxr with small pleural effusions, and mild atelectasis.  Underwent EGD/colonoscopy 6/28 with mild gastritis, normal colonoscopy.  Temperature last pm 102.2 and we were consulted for evaluation and treatment.  Currently on Rocephin. She does complain of burning with urination. No cough, increased sputum production, nausea, abdominal pain, diarrhea.  Right leg is more red than baseline but left is currently at baseline.  Still with some dysuria.  Son at bedside assisted with translation.    7/1/2024: Afebrile overnight.  Feeling better.  Dysuria resolved.  Appetite okay.  No further blood in stool.  Leg swelling chronic and stable.  She says she feels well enough to go home    ROS: see HPI    No Known Allergies      Medication:    Current Facility-Administered Medications:     acetaminophen (TYLENOL) tablet 650 mg, 650 mg, Oral, Q6H PRN, Brunner, Mark I, MD, 650 mg at 06/29/24 2129    anastrozole (ARIMIDEX) tablet 1 mg, 1 mg, Oral,  Daily, Viktor Morales MD, 1 mg at 06/30/24 0843    Biotene dry mouth liquid 15 mL, 15 mL, Swish & Spit, 5x Daily PRN, Brunner, Mark I, MD    sennosides-docusate (PERICOLACE) 8.6-50 MG per tablet 2 tablet, 2 tablet, Oral, BID PRN **AND** polyethylene glycol (MIRALAX) packet 17 g, 17 g, Oral, Daily PRN **AND** bisacodyl (DULCOLAX) EC tablet 5 mg, 5 mg, Oral, Daily PRN **AND** bisacodyl (DULCOLAX) suppository 10 mg, 10 mg, Rectal, Daily PRN, Brunner, Mark I, MD    cefTRIAXone (ROCEPHIN) 2,000 mg in sodium chloride 0.9 % 100 mL MBP, 2,000 mg, Intravenous, Q24H, Sivakumar Felton MD, Last Rate: 200 mL/hr at 06/30/24 1402, 2,000 mg at 06/30/24 1402    First Mouthwash (Magic Mouthwash) 5 mL, 5 mL, Swish & Spit, Q8H, Brunner, Mark I, MD, 5 mL at 07/01/24 0542    folic acid (FOLVITE) tablet 1 mg, 1 mg, Oral, Daily, Viktor Morales MD, 1 mg at 07/01/24 0817    heparin (porcine) 5000 UNIT/ML injection 5,000 Units, 5,000 Units, Subcutaneous, Q8H, Viktor Morales MD, 5,000 Units at 07/01/24 0542    nebivolol (BYSTOLIC) tablet 2.5 mg, 2.5 mg, Oral, Q24H, Brunner, Mark I, MD, 2.5 mg at 06/30/24 0843    ondansetron (ZOFRAN) injection 4 mg, 4 mg, Intravenous, Q6H PRN, Viktor Morales MD, 4 mg at 06/29/24 1532    oxymetazoline (AFRIN) nasal spray 2 spray, 2 spray, Each Nare, BID PRN, Brunner, Mark I, MD    pantoprazole (PROTONIX) injection 40 mg, 40 mg, Intravenous, QAM AC, Viktor Morales MD, 40 mg at 07/01/24 0817    rosuvastatin (CRESTOR) tablet 10 mg, 10 mg, Oral, Nightly, Brunner, Mark I, MD, 10 mg at 06/30/24 2147    sodium chloride 0.9 % flush 10 mL, 10 mL, Intravenous, PRN, Brunner, Mark I, MD    sodium chloride 0.9 % flush 10 mL, 10 mL, Intravenous, Q12H, Brunner, Mark I, MD, 10 mL at 07/01/24 0817    sodium chloride 0.9 % flush 10 mL, 10 mL, Intravenous, PRN, Brunner, Mark I, MD    sodium chloride 0.9 % infusion 40 mL, 40 mL, Intravenous, PRN, Brunner, Mark I, MD    sodium chloride 0.9 % with KCl 20 mEq/L infusion,  50 mL/hr, Intravenous, Continuous, Adolfo Reynoso MD, Last Rate: 50 mL/hr at 24 1602, 50 mL/hr at 24 1602    sodium chloride nasal spray 1 spray, 1 spray, Each Nare, PRN, Brunner, Mark I, MD    terazosin (HYTRIN) capsule 2 mg, 2 mg, Oral, Nightly, Brunner, Mark I, MD, 2 mg at 24 2149    Antibiotics:  Anti-Infectives (From admission, onward)      Ordered     Dose/Rate Route Frequency Start Stop    24 1337  cefTRIAXone (ROCEPHIN) 2,000 mg in sodium chloride 0.9 % 100 mL MBP        Ordering Provider: Sivakumar Felton MD    2,000 mg  200 mL/hr over 30 Minutes Intravenous Every 24 Hours 24 1400 24 1359            Physical Exam:   Vital Signs  Temp (24hrs), Av.7 °F (37.1 °C), Min:97.9 °F (36.6 °C), Max:99.9 °F (37.7 °C)    Temp  Min: 97.9 °F (36.6 °C)  Max: 99.9 °F (37.7 °C)  BP  Min: 116/49  Max: 152/100  Pulse  Min: 84  Max: 99  Resp  Min: 20  Max: 20  SpO2  Min: 95 %  Max: 96 %    GENERAL: Awake and alert, in no acute distress. Up in chair   HEENT: Normocephalic, atraumatic.  PERRL. EOMI. No conjunctival injection. No icterus.    NECK: Supple  HEART: RRR; No murmur,   LUNGS: Clear to auscultation bilaterally without wheezing, rales, rhonchi. Normal respiratory effort. Nonlabored.    ABDOMEN: obese. Soft, nontender, nondistended.    EXT:  bilateral lower extremity swelling   :  Without Gonzalez catheter.  MSK: No joint effusions or erythema  SKIN: no diffuse rash. Faint erythema RLE, improved LLE with chronic venous stasis hyperpigmentation  NEURO: Oriented to PPT.  Motor 5/5 strength  PSYCHIATRIC: Normal insight and judgment. Cooperative with PE    Laboratory Data    Results from last 7 days   Lab Units 24  0828 24  0745 06/28/24  0740   WBC 10*3/mm3 13.93* 14.59* 5.90   HEMOGLOBIN g/dL 7.3* 7.4* 7.7*   HEMATOCRIT % 23.3* 22.7* 24.2*   PLATELETS 10*3/mm3 114* 126* 146     Results from last 7 days   Lab Units 24  0828   SODIUM mmol/L 134*   POTASSIUM  mmol/L 3.7   CHLORIDE mmol/L 101   CO2 mmol/L 18.0*   BUN mg/dL 57*   CREATININE mg/dL 5.39*   GLUCOSE mg/dL 99   CALCIUM mg/dL 8.6                     Results from last 7 days   Lab Units 06/30/24  0828   CK TOTAL U/L 1,233*         Estimated Creatinine Clearance: 12.6 mL/min (A) (by C-G formula based on SCr of 5.39 mg/dL (H)).      Microbiology:  Microbiology Results (last 10 days)       Procedure Component Value - Date/Time    MRSA Screen, PCR (Inpatient) - Swab, Nares [[id number]]  (Normal) Collected: 06/30/24 1608    Lab Status: Final result Specimen: Swab from Nares Updated: 06/30/24 1732     MRSA PCR Negative    Narrative:      The negative predictive value of this diagnostic test is high and should only be used to consider de-escalating anti-MRSA therapy. A positive result may indicate colonization with MRSA and must be correlated clinically.  MRSA Negative    Blood Culture - Blood, Wrist, Left [[id number]]  (Abnormal) Collected: 06/29/24 1825    Lab Status: Preliminary result Specimen: Blood from Wrist, Left Updated: 06/30/24 2033     Blood Culture Abnormal Stain     Gram Stain Anaerobic Bottle Gram positive cocci in clusters    Blood Culture ID, PCR - Blood, Wrist, Left [[id number]]  (Abnormal) Collected: 06/29/24 1825    Lab Status: Final result Specimen: Blood from Wrist, Left Updated: 06/30/24 2145     BCID, PCR Staph spp, not aureus or lugdunensis. Identification by BCID2 PCR.     BOTTLE TYPE Anaerobic Bottle    Blood Culture - Blood, Arm, Left [[id number]]  (Normal) Collected: 06/29/24 1808    Lab Status: Preliminary result Specimen: Blood from Arm, Left Updated: 06/30/24 1931     Blood Culture No growth at 24 hours    Urine Culture - Urine, Urine, Clean Catch [[id number]]  (Abnormal) Collected: 06/29/24 1534    Lab Status: Preliminary result Specimen: Urine, Clean Catch Updated: 06/30/24 1159     Urine Culture 50,000 CFU/mL Escherichia coli    Narrative:      Colonization of the urinary tract without  infection is common. Treatment is discouraged unless the patient is symptomatic, pregnant, or undergoing an invasive urologic procedure.    Respiratory Panel PCR w/COVID-19(SARS-CoV-2) MEDARDO/BRYCE/RAKESH/PAD/COR/AURELIO In-House, NP Swab in UTM/VTM, 2 HR TAT - Swab, Nasopharynx [[id number]]  (Normal) Collected: 06/29/24 1206    Lab Status: Final result Specimen: Swab from Nasopharynx Updated: 06/29/24 1257     ADENOVIRUS, PCR Not Detected     Coronavirus 229E Not Detected     Coronavirus HKU1 Not Detected     Coronavirus NL63 Not Detected     Coronavirus OC43 Not Detected     COVID19 Not Detected     Human Metapneumovirus Not Detected     Human Rhinovirus/Enterovirus Not Detected     Influenza A PCR Not Detected     Influenza B PCR Not Detected     Parainfluenza Virus 1 Not Detected     Parainfluenza Virus 2 Not Detected     Parainfluenza Virus 3 Not Detected     Parainfluenza Virus 4 Not Detected     RSV, PCR Not Detected     Bordetella pertussis pcr Not Detected     Bordetella parapertussis PCR Not Detected     Chlamydophila pneumoniae PCR Not Detected     Mycoplasma pneumo by PCR Not Detected    Narrative:      In the setting of a positive respiratory panel with a viral infection PLUS a negative procalcitonin without other underlying concern for bacterial infection, consider observing off antibiotics or discontinuation of antibiotics and continue supportive care. If the respiratory panel is positive for atypical bacterial infection (Bordetella pertussis, Chlamydophila pneumoniae, or Mycoplasma pneumoniae), consider antibiotic de-escalation to target atypical bacterial infection.            Radiology:  Imaging Results (Last 72 Hours)       Procedure Component Value Units Date/Time    XR Chest 1 View [[id number]] Collected: 06/29/24 1214     Updated: 06/29/24 1220    Narrative:      XR CHEST 1 VW    Date of Exam: 6/29/2024 11:13 AM EDT    Indication: fever, unclear source    Comparison: 6/19/2024    Findings:  Cardiomegaly.  Small left greater than right pleural effusions with associated mild bibasilar opacities. No pneumothorax. No evidence of acute osseous abnormalities. Visualized upper abdomen is unremarkable.      Impression:      Impression:  Small left greater than right pleural effusions with associated mild bibasilar opacities.      Electronically Signed: Av Murguia MD    6/29/2024 12:17 PM EDT    Workstation ID: [id number]          I personally reviewed the above CXR: bilateral lower lobe opacities       Impression:   Fever, sepsis: likely due to UTI alone. RLE cellulitis could also be contributing. Blood cx pending   E coli UTI, sensitivities pending. Dysuria resolved quickly.   RLE cellulitis: In setting of chronic venous stasis dermatitis.  Had some increased erythema on the right compared to baseline. No DVT on US. Improved today, nearly resolved  Chronic bilateral lower extremity venous stasis dermatitis and hyperpigmentation  Chronic LLE DVT and hx of PE  Bibasilar pulmonary infiltrate: seen on CXR. Most likely atelectasis versus edema.  No significant respiratory symptoms and not currently hypoxic on room  Single blood culture positive for coagulase-negative staph, likely skin contaminant: paired culture negative so far  GI bleed, normal colonoscopy, mild gastritis by EGD   Acute renal failure on CKD stage 3A: nephrology evaluating. Some concern for AIN  Breast cancer s/p right mastectomy now on hormone therapy  Morbid obesity, BMI 55  DVT/PTE on Pradaxa     PLAN/RECOMMENDATIONS:   - Follow-up pending paired blood culture from 6/29  - f/u pending E coli TRSIHA data  - Follow CBC, BMP    Last dose of ceftriaxone today.  Given improvement in exam and dysuria, as well as some concern for AIN will reduce to 1 g daily.  Anticipate transition to oral antibiotics tomorrow pending sensitivity data, possibly renally dosed keflex vs levofloxacin depending on nephrology's level of concern for AIN from cephalosporins.        Discussed with patient and daughter at bedside. Also with Dr Rendon. I will follow.        Sivakumar Felton MD  7/1/2024  10:11 EDT

## 2024-07-01 NOTE — PAYOR COMM NOTE
"Moon Lerma (68 y.o. Female)       Date of Birth   1955    Social Security Number       Address   [address] [place] [address]    Home Phone   [phone]    MRN   [id number]       [place]   None    Marital Status                               Admission Date   6/19/24    Admission Type   Emergency    Admitting Provider   Viktor Morales MD    Attending Provider   Viktor Morales MD    Department, Room/Bed   [address], S581/1       Discharge Date       Discharge Disposition       Discharge Destination                                 Attending Provider: Viktor Morales MD    Allergies: No Known Allergies    Isolation: Contact   Infection: ESBL E coli (07/01/24)   Code Status: CPR    Ht: 152.4 cm (60\")   Wt: 128 kg (282 lb)    Admission Cmt: None   Principal Problem: Renal failure [N19]                   Active Insurance as of 6/19/2024       Primary Coverage       Payor Plan Insurance Group Employer/Plan Group    MISC COMMERCIAL MISC COMMERCIAL [id number]       Coverage Address Coverage Phone Number Coverage Fax Number Effective Dates    [address] [address] [phone]  6/8/2024 - None Entered    [facility] [id number]         Subscriber Name Subscriber Birth Date Member ID       MOON LERMA 1955 [id number]                     Emergency Contacts        (Rel.) Home Phone Work Phone Mobile Phone    SHOLA ONTIVEROS (Daughter) [phone] -- --    MARIO MARRERO (Other) [phone] -- --              [place]: [facility] [id number] Tax ID [id number]  Insurance Information                  MISC COMMERCIAL/[employer] Phone: [phone]    Subscriber: Moon Lerma Subscriber#: [id number]    Group#: [id number] Precert#: --          Current Facility-Administered Medications   Medication Dose Route Frequency Provider Last Rate Last Admin    acetaminophen (TYLENOL) tablet 650 mg  650 mg Oral Q6H PRN Brunner, Mark I, MD   650 mg at 06/29/24 2129    " anastrozole (ARIMIDEX) tablet 1 mg  1 mg Oral Daily Viktor Morales MD   1 mg at 06/30/24 0843    Biotene dry mouth liquid 15 mL  15 mL Swish & Spit 5x Daily PRN Brunner, Mark I, MD        sennosides-docusate (PERICOLACE) 8.6-50 MG per tablet 2 tablet  2 tablet Oral BID PRN Brunner, Mark I, MD        And    polyethylene glycol (MIRALAX) packet 17 g  17 g Oral Daily PRN Brunner, Mark I, MD        And    bisacodyl (DULCOLAX) EC tablet 5 mg  5 mg Oral Daily PRN Brunner, Mark I, MD        And    bisacodyl (DULCOLAX) suppository 10 mg  10 mg Rectal Daily PRN Brunner, Mark I, MD        cefTRIAXone (ROCEPHIN) 1,000 mg in sodium chloride 0.9 % 100 mL MBP  1,000 mg Intravenous Q24H Sivakumar Felton MD        First Mouthwash (Magic Mouthwash) 5 mL  5 mL Swish & Spit Q8H Brunner, Mark I, MD   5 mL at 07/01/24 0542    folic acid (FOLVITE) tablet 1 mg  1 mg Oral Daily Viktor Morales MD   1 mg at 07/01/24 0817    heparin (porcine) 5000 UNIT/ML injection 5,000 Units  5,000 Units Subcutaneous Q8H Viktor Morales MD   5,000 Units at 07/01/24 0542    nebivolol (BYSTOLIC) tablet 2.5 mg  2.5 mg Oral Q24H Brunner, Mark I, MD   2.5 mg at 06/30/24 0843    ondansetron (ZOFRAN) injection 4 mg  4 mg Intravenous Q6H PRN Viktor Morales MD   4 mg at 06/29/24 1532    oxymetazoline (AFRIN) nasal spray 2 spray  2 spray Each Nare BID PRN Brunner, Mark I, MD        pantoprazole (PROTONIX) injection 40 mg  40 mg Intravenous QAM AC Viktor Morales MD   40 mg at 07/01/24 0817    rosuvastatin (CRESTOR) tablet 10 mg  10 mg Oral Nightly Brunner, Mark I, MD   10 mg at 06/30/24 2147    sodium chloride 0.9 % flush 10 mL  10 mL Intravenous PRN Brunner, Mark I, MD        sodium chloride 0.9 % flush 10 mL  10 mL Intravenous Q12H Brunner, Mark I, MD   10 mL at 07/01/24 0817    sodium chloride 0.9 % flush 10 mL  10 mL Intravenous PRN Brunner, Mark I, MD        sodium chloride 0.9 % infusion 40 mL  40 mL Intravenous PRN Brunner, Mark I, MD        sodium  chloride 0.9 % with KCl 20 mEq/L infusion  50 mL/hr Intravenous Continuous Adolfo Reynoso MD 50 mL/hr at 06/30/24 1602 50 mL/hr at 06/30/24 1602    sodium chloride nasal spray 1 spray  1 spray Each Nare PRN Brunner, Mark I, MD        terazosin (HYTRIN) capsule 2 mg  2 mg Oral Nightly Brunner, Mark I, MD   2 mg at 06/30/24 2149     Lab Results (last 24 hours)       Procedure Component Value Units Date/Time    Urine Culture - Urine, Urine, Clean Catch [[id number]]  (Abnormal)  (Susceptibility) Collected: 06/29/24 1534    Specimen: Urine, Clean Catch Updated: 07/01/24 1110     Urine Culture 50,000 CFU/mL Escherichia coli ESBL     Comment:   Consider infectious disease consult.  Susceptibility results may not correlate to clinical outcomes.       Narrative:      Colonization of the urinary tract without infection is common. Treatment is discouraged unless the patient is symptomatic, pregnant, or undergoing an invasive urologic procedure.  Recent outcomes data supports the use of pip/tazo in the treatment of susceptible ESBL infections for uncomplicated UTI. Consider use of pip/tazo as a carbapenem-sparing regimen in applicable patients.    Susceptibility        Escherichia coli ESBL      TRISHA      Amikacin Intermediate      Ertapenem Susceptible      Gentamicin Resistant      Levofloxacin Resistant      Meropenem Susceptible      Nitrofurantoin Intermediate      Piperacillin + Tazobactam Susceptible      Tobramycin Resistant      Trimethoprim + Sulfamethoxazole Susceptible                           Blood Culture ID, PCR - Blood, Wrist, Left [[id number]]  (Abnormal) Collected: 06/29/24 1825    Specimen: Blood from Wrist, Left Updated: 06/30/24 2145     BCID, PCR Staph spp, not aureus or lugdunensis. Identification by BCID2 PCR.     BOTTLE TYPE Anaerobic Bottle    Blood Culture - Blood, Wrist, Left [[id number]]  (Abnormal) Collected: 06/29/24 1825    Specimen: Blood from Wrist, Left Updated: 06/30/24 2033     Blood  Culture Abnormal Stain     Gram Stain Anaerobic Bottle Gram positive cocci in clusters    Blood Culture - Blood, Arm, Left [[id number]]  (Normal) Collected: 06/29/24 1808    Specimen: Blood from Arm, Left Updated: 06/30/24 1931     Blood Culture No growth at 24 hours    MRSA Screen, PCR (Inpatient) - Swab, Nares [[id number]]  (Normal) Collected: 06/30/24 1608    Specimen: Swab from Nares Updated: 06/30/24 1732     MRSA PCR Negative    Narrative:      The negative predictive value of this diagnostic test is high and should only be used to consider de-escalating anti-MRSA therapy. A positive result may indicate colonization with MRSA and must be correlated clinically.  MRSA Negative    CK [[id number]]  (Abnormal) Collected: 06/30/24 0828    Specimen: Blood Updated: 06/30/24 1332     Creatine Kinase 1,233 U/L           Imaging Results (Last 24 Hours)       ** No results found for the last 24 hours. **          Orders (last 24 hrs)        Start     Ordered    07/01/24 1400  cefTRIAXone (ROCEPHIN) 1,000 mg in sodium chloride 0.9 % 100 mL MBP  Every 24 Hours         07/01/24 1018    07/01/24 0900  saccharomyces boulardii (FLORASTOR) capsule 250 mg  Daily,   Status:  Discontinued         06/30/24 1420    07/01/24 0600  Hepatic Function Panel  Morning Draw         06/30/24 1417    07/01/24 0600  C-reactive Protein  Morning Draw         06/30/24 1417    06/30/24 2008  Blood Culture ID, PCR - Blood, Wrist, Left  Once         06/30/24 2008 06/30/24 1700  cefTRIAXone (ROCEPHIN) 2,000 mg in sodium chloride 0.9 % 100 mL MBP  Every 24 Hours,   Status:  Discontinued         06/30/24 1235    06/30/24 1530  sodium chloride 0.9 % with KCl 20 mEq/L infusion  Continuous         06/30/24 1441    06/30/24 1528  Wound Ostomy Eval & Treat  Once         06/30/24 1529    06/30/24 1500  DAPTOmycin (CUBICIN) 300 mg in sodium chloride 0.9 % 50 mL IVPB  Every 48 Hours,   Status:  Discontinued         06/30/24 1356    06/30/24 1418  MRSA Screen,  PCR (Inpatient) - Swab, Nares  Once         06/30/24 1417    06/30/24 1400  ertapenem (INVanz) 500 mg in sodium chloride 0.9 % 50 mL IVPB  Every 24 Hours,   Status:  Discontinued         06/30/24 1259    06/30/24 1400  cefTRIAXone (ROCEPHIN) 2,000 mg in sodium chloride 0.9 % 100 mL MBP  Every 24 Hours,   Status:  Discontinued         06/30/24 1337    06/30/24 1258  Pharmacy Consult - Pharmacy to dose  Continuous PRN,   Status:  Discontinued         06/30/24 1259    06/30/24 1258  CK  Once         06/30/24 1259    06/29/24 1800  Incentive Spirometry  Every 4 Hours While Awake       06/29/24 1722    06/29/24 1645  lactated ringers infusion  Continuous,   Status:  Discontinued         06/29/24 1557    06/29/24 1500  ondansetron (ZOFRAN) injection 4 mg  Every 6 Hours PRN         06/29/24 1500    06/29/24 0900  anastrozole (ARIMIDEX) tablet 1 mg  Daily         06/28/24 1944    06/29/24 0730  pantoprazole (PROTONIX) injection 40 mg  Every Morning Before Breakfast         06/28/24 1950    06/28/24 2200  heparin (porcine) 5000 UNIT/ML injection 5,000 Units  Every 8 Hours Scheduled         06/28/24 1944    06/28/24 2045  folic acid (FOLVITE) tablet 1 mg  Daily         06/28/24 1948    06/28/24 0600  Protime-INR  Daily       06/27/24 0748    06/28/24 0600  CBC (No Diff)  Daily       06/27/24 0748    06/28/24 0600  Basic Metabolic Panel  Daily       06/27/24 0748    06/27/24 2100  rosuvastatin (CRESTOR) tablet 10 mg  Nightly         06/27/24 1757    06/26/24 1200  Dietary Nutrition Supplements Boost Glucose Control; chocolate  Daily With Lunch       06/26/24 0937    06/23/24 1145  oxymetazoline (AFRIN) nasal spray 2 spray  2 Times Daily PRN         06/23/24 1134    06/23/24 1133  sodium chloride nasal spray 1 spray  As Needed         06/23/24 1134    06/23/24 1045  lactated ringers infusion  Continuous,   Status:  Discontinued         06/23/24 0956    06/21/24 1400  First Mouthwash (Magic Mouthwash) 5 mL  Every 8 Hours  "Scheduled         06/21/24 1340    06/21/24 1115  Biotene dry mouth liquid 15 mL  5 Times Daily PRN         06/21/24 1115    06/20/24 0900  nebivolol (BYSTOLIC) tablet 2.5 mg  Every 24 Hours Scheduled         06/19/24 1817 06/20/24 0800  acetaminophen (TYLENOL) tablet 650 mg  Every 6 Hours PRN         06/20/24 0800    06/20/24 0800  Oral Care  2 Times Daily       06/19/24 1817 06/19/24 2100  terazosin (HYTRIN) capsule 2 mg  Nightly         06/19/24 1817 06/19/24 2100  sodium chloride 0.9 % flush 10 mL  Every 12 Hours Scheduled         06/19/24 1817 06/19/24 2000  Vital Signs  Every 4 Hours       06/19/24 1817 06/19/24 1818  Intake & Output  Every Shift       06/19/24 1817 06/19/24 1817  sodium chloride 0.9 % flush 10 mL  As Needed         06/19/24 1817 06/19/24 1817  sodium chloride 0.9 % infusion 40 mL  As Needed         06/19/24 1817 06/19/24 1817  sennosides-docusate (PERICOLACE) 8.6-50 MG per tablet 2 tablet  2 Times Daily PRN        Placed in \"And\" Linked Group    06/19/24 1817    06/19/24 1817  polyethylene glycol (MIRALAX) packet 17 g  Daily PRN        Placed in \"And\" Linked Group    06/19/24 1817    06/19/24 1817  bisacodyl (DULCOLAX) EC tablet 5 mg  Daily PRN        Placed in \"And\" Linked Group    06/19/24 1817    06/19/24 1817  bisacodyl (DULCOLAX) suppository 10 mg  Daily PRN        Placed in \"And\" Linked Group    06/19/24 1817    06/19/24 1319  sodium chloride 0.9 % flush 10 mL  As Needed         06/19/24 1319    Unscheduled  Up With Assistance  As Needed       06/19/24 1817    Unscheduled  Transfuse RBC, 2 Units Infuse Each Unit Over: 3.5H  Transfusion       06/21/24 0631    Unscheduled  Transfuse RBC, 2 Units Infuse Each Unit Over: 3.5H  Transfusion       06/21/24 0959    --  Fulvestrant (FASLODEX) 250 MG/5ML chemo syringe  Every 30 Days         06/19/24 1907    --  rosuvastatin (CRESTOR) 10 MG tablet  Nightly         06/19/24 1908                     Physician Progress Notes " (last 24 hours)        Sivakumar Felton MD at 07/01/24 1011              Moon Lerma  1955  [id number]    Date of Consult: 7/1/2024  Admission Date: 6/19/2024    Reason for Consultation: Fever    History of present illness:    Patient is a 68 y.o. female, with PMH breast cancer( now on hormone therapy) PE/DVT ( on Pradaxa)  HTN, chronic lymphedema, seen today for an Ecoli UTI. She is here from [place] visiting family, and developed shortness of breath prompting presentation to the ED.  She had been recently treated for RLE cellulitis with a course of oral antibiotics( Cefpodoxime and Augmentin ) in [place] prior to traveling with improvement.  She bit her tongue one week ago, causing an ulceration, then began to notice dark stools, fatigue. She had been seen by a new PCP day of admission, referred to the ED. Admitting labs with hgb 5.8, Scr 6.7, PT 82.1, WBC 12, proBNP 3437, UA TNTC WBC, urine culture with Ecoli, PCT 2.61. CXR without acute abnormality.  CT A/P without acute abdominopelvic findings. Renal US without evidence of hydronephrosis.  Recent cxr with small pleural effusions, and mild atelectasis.  Underwent EGD/colonoscopy 6/28 with mild gastritis, normal colonoscopy.  Temperature last pm 102.2 and we were consulted for evaluation and treatment.  Currently on Rocephin. She does complain of burning with urination. No cough, increased sputum production, nausea, abdominal pain, diarrhea.  Right leg is more red than baseline but left is currently at baseline.  Still with some dysuria.  Son at bedside assisted with translation.    7/1/2024: Afebrile overnight.  Feeling better.  Dysuria resolved.  Appetite okay.  No further blood in stool.  Leg swelling chronic and stable.  She says she feels well enough to go home    ROS: see HPI    No Known Allergies      Medication:    Current Facility-Administered Medications:     acetaminophen (TYLENOL) tablet 650 mg, 650 mg, Oral, Q6H PRN, Brunner, Mark I, MD, 650  mg at 06/29/24 2129    anastrozole (ARIMIDEX) tablet 1 mg, 1 mg, Oral, Daily, Viktor Morales MD, 1 mg at 06/30/24 0843    Biotene dry mouth liquid 15 mL, 15 mL, Swish & Spit, 5x Daily PRN, Brunner, Mark I, MD    sennosides-docusate (PERICOLACE) 8.6-50 MG per tablet 2 tablet, 2 tablet, Oral, BID PRN **AND** polyethylene glycol (MIRALAX) packet 17 g, 17 g, Oral, Daily PRN **AND** bisacodyl (DULCOLAX) EC tablet 5 mg, 5 mg, Oral, Daily PRN **AND** bisacodyl (DULCOLAX) suppository 10 mg, 10 mg, Rectal, Daily PRN, Brunner, Mark I, MD    cefTRIAXone (ROCEPHIN) 2,000 mg in sodium chloride 0.9 % 100 mL MBP, 2,000 mg, Intravenous, Q24H, Sivakumar Felton MD, Last Rate: 200 mL/hr at 06/30/24 1402, 2,000 mg at 06/30/24 1402    First Mouthwash (Magic Mouthwash) 5 mL, 5 mL, Swish & Spit, Q8H, Brunner, Mark I, MD, 5 mL at 07/01/24 0542    folic acid (FOLVITE) tablet 1 mg, 1 mg, Oral, Daily, Viktor Morales MD, 1 mg at 07/01/24 0817    heparin (porcine) 5000 UNIT/ML injection 5,000 Units, 5,000 Units, Subcutaneous, Q8H, Viktor Morales MD, 5,000 Units at 07/01/24 0542    nebivolol (BYSTOLIC) tablet 2.5 mg, 2.5 mg, Oral, Q24H, Brunner, Mark I, MD, 2.5 mg at 06/30/24 0843    ondansetron (ZOFRAN) injection 4 mg, 4 mg, Intravenous, Q6H PRN, Viktor Morales MD, 4 mg at 06/29/24 1532    oxymetazoline (AFRIN) nasal spray 2 spray, 2 spray, Each Nare, BID PRN, Brunner, Mark I, MD    pantoprazole (PROTONIX) injection 40 mg, 40 mg, Intravenous, QA AC, Viktor Morales MD, 40 mg at 07/01/24 0817    rosuvastatin (CRESTOR) tablet 10 mg, 10 mg, Oral, Nightly, Brunner, Mark I, MD, 10 mg at 06/30/24 2147    sodium chloride 0.9 % flush 10 mL, 10 mL, Intravenous, PRN, Brunner, Mark I, MD    sodium chloride 0.9 % flush 10 mL, 10 mL, Intravenous, Q12H, Brunner, Mark I, MD, 10 mL at 07/01/24 0817    sodium chloride 0.9 % flush 10 mL, 10 mL, Intravenous, PRN, Brunner, Mark I, MD    sodium chloride 0.9 % infusion 40 mL, 40 mL, Intravenous, PRN,  Brunner, Mark I, MD    sodium chloride 0.9 % with KCl 20 mEq/L infusion, 50 mL/hr, Intravenous, Continuous, Adolfo Reynoso MD, Last Rate: 50 mL/hr at 24 1602, 50 mL/hr at 24 1602    sodium chloride nasal spray 1 spray, 1 spray, Each Nare, PRN, Brunner, Mark I, MD    terazosin (HYTRIN) capsule 2 mg, 2 mg, Oral, Nightly, Brunner, Mark I, MD, 2 mg at 24 2149    Antibiotics:  Anti-Infectives (From admission, onward)      Ordered     Dose/Rate Route Frequency Start Stop    24 1337  cefTRIAXone (ROCEPHIN) 2,000 mg in sodium chloride 0.9 % 100 mL MBP        Ordering Provider: Sivakumar Felton MD    2,000 mg  200 mL/hr over 30 Minutes Intravenous Every 24 Hours 24 1400 24 1359            Physical Exam:   Vital Signs  Temp (24hrs), Av.7 °F (37.1 °C), Min:97.9 °F (36.6 °C), Max:99.9 °F (37.7 °C)    Temp  Min: 97.9 °F (36.6 °C)  Max: 99.9 °F (37.7 °C)  BP  Min: 116/49  Max: 152/100  Pulse  Min: 84  Max: 99  Resp  Min: 20  Max: 20  SpO2  Min: 95 %  Max: 96 %    GENERAL: Awake and alert, in no acute distress. Up in chair   HEENT: Normocephalic, atraumatic.  PERRL. EOMI. No conjunctival injection. No icterus.    NECK: Supple  HEART: RRR; No murmur,   LUNGS: Clear to auscultation bilaterally without wheezing, rales, rhonchi. Normal respiratory effort. Nonlabored.    ABDOMEN: obese. Soft, nontender, nondistended.    EXT:  bilateral lower extremity swelling   :  Without Gonzalez catheter.  MSK: No joint effusions or erythema  SKIN: no diffuse rash. Faint erythema RLE, improved LLE with chronic venous stasis hyperpigmentation  NEURO: Oriented to PPT.  Motor 5/5 strength  PSYCHIATRIC: Normal insight and judgment. Cooperative with PE    Laboratory Data    Results from last 7 days   Lab Units 24  0828 24  0745 24  0740   WBC 10*3/mm3 13.93* 14.59* 5.90   HEMOGLOBIN g/dL 7.3* 7.4* 7.7*   HEMATOCRIT % 23.3* 22.7* 24.2*   PLATELETS 10*3/mm3 114* 126* 146     Results from last  7 days   Lab Units 06/30/24  0828   SODIUM mmol/L 134*   POTASSIUM mmol/L 3.7   CHLORIDE mmol/L 101   CO2 mmol/L 18.0*   BUN mg/dL 57*   CREATININE mg/dL 5.39*   GLUCOSE mg/dL 99   CALCIUM mg/dL 8.6                     Results from last 7 days   Lab Units 06/30/24  0828   CK TOTAL U/L 1,233*         Estimated Creatinine Clearance: 12.6 mL/min (A) (by C-G formula based on SCr of 5.39 mg/dL (H)).      Microbiology:  Microbiology Results (last 10 days)       Procedure Component Value - Date/Time    MRSA Screen, PCR (Inpatient) - Swab, Nares [[id number]]  (Normal) Collected: 06/30/24 1608    Lab Status: Final result Specimen: Swab from Nares Updated: 06/30/24 1732     MRSA PCR Negative    Narrative:      The negative predictive value of this diagnostic test is high and should only be used to consider de-escalating anti-MRSA therapy. A positive result may indicate colonization with MRSA and must be correlated clinically.  MRSA Negative    Blood Culture - Blood, Wrist, Left [[id number]]  (Abnormal) Collected: 06/29/24 1825    Lab Status: Preliminary result Specimen: Blood from Wrist, Left Updated: 06/30/24 2033     Blood Culture Abnormal Stain     Gram Stain Anaerobic Bottle Gram positive cocci in clusters    Blood Culture ID, PCR - Blood, Wrist, Left [[id number]]  (Abnormal) Collected: 06/29/24 1825    Lab Status: Final result Specimen: Blood from Wrist, Left Updated: 06/30/24 2145     BCID, PCR Staph spp, not aureus or lugdunensis. Identification by BCID2 PCR.     BOTTLE TYPE Anaerobic Bottle    Blood Culture - Blood, Arm, Left [[id number]]  (Normal) Collected: 06/29/24 1808    Lab Status: Preliminary result Specimen: Blood from Arm, Left Updated: 06/30/24 1931     Blood Culture No growth at 24 hours    Urine Culture - Urine, Urine, Clean Catch [[id number]]  (Abnormal) Collected: 06/29/24 1534    Lab Status: Preliminary result Specimen: Urine, Clean Catch Updated: 06/30/24 1159     Urine Culture 50,000 CFU/mL Escherichia  coli    Narrative:      Colonization of the urinary tract without infection is common. Treatment is discouraged unless the patient is symptomatic, pregnant, or undergoing an invasive urologic procedure.    Respiratory Panel PCR w/COVID-19(SARS-CoV-2) MEDARDO/BRYCE/RAKESH/PAD/COR/AURELIO In-House, NP Swab in UTM/VTM, 2 HR TAT - Swab, Nasopharynx [[id number]]  (Normal) Collected: 06/29/24 1206    Lab Status: Final result Specimen: Swab from Nasopharynx Updated: 06/29/24 1257     ADENOVIRUS, PCR Not Detected     Coronavirus 229E Not Detected     Coronavirus HKU1 Not Detected     Coronavirus NL63 Not Detected     Coronavirus OC43 Not Detected     COVID19 Not Detected     Human Metapneumovirus Not Detected     Human Rhinovirus/Enterovirus Not Detected     Influenza A PCR Not Detected     Influenza B PCR Not Detected     Parainfluenza Virus 1 Not Detected     Parainfluenza Virus 2 Not Detected     Parainfluenza Virus 3 Not Detected     Parainfluenza Virus 4 Not Detected     RSV, PCR Not Detected     Bordetella pertussis pcr Not Detected     Bordetella parapertussis PCR Not Detected     Chlamydophila pneumoniae PCR Not Detected     Mycoplasma pneumo by PCR Not Detected    Narrative:      In the setting of a positive respiratory panel with a viral infection PLUS a negative procalcitonin without other underlying concern for bacterial infection, consider observing off antibiotics or discontinuation of antibiotics and continue supportive care. If the respiratory panel is positive for atypical bacterial infection (Bordetella pertussis, Chlamydophila pneumoniae, or Mycoplasma pneumoniae), consider antibiotic de-escalation to target atypical bacterial infection.            Radiology:  Imaging Results (Last 72 Hours)       Procedure Component Value Units Date/Time    XR Chest 1 View [[id number]] Collected: 06/29/24 1214     Updated: 06/29/24 1220    Narrative:      XR CHEST 1 VW    Date of Exam: 6/29/2024 11:13 AM EDT    Indication: fever,  unclear source    Comparison: 6/19/2024    Findings:  Cardiomegaly. Small left greater than right pleural effusions with associated mild bibasilar opacities. No pneumothorax. No evidence of acute osseous abnormalities. Visualized upper abdomen is unremarkable.      Impression:      Impression:  Small left greater than right pleural effusions with associated mild bibasilar opacities.      Electronically Signed: Av Murguia MD    6/29/2024 12:17 PM EDT    Workstation ID: [id number]          I personally reviewed the above CXR: bilateral lower lobe opacities       Impression:   Fever, sepsis: likely due to UTI alone. RLE cellulitis could also be contributing. Blood cx pending   E coli UTI, sensitivities pending. Dysuria resolved quickly.   RLE cellulitis: In setting of chronic venous stasis dermatitis.  Had some increased erythema on the right compared to baseline. No DVT on US. Improved today, nearly resolved  Chronic bilateral lower extremity venous stasis dermatitis and hyperpigmentation  Chronic LLE DVT and hx of PE  Bibasilar pulmonary infiltrate: seen on CXR. Most likely atelectasis versus edema.  No significant respiratory symptoms and not currently hypoxic on room  Single blood culture positive for coagulase-negative staph, likely skin contaminant: paired culture negative so far  GI bleed, normal colonoscopy, mild gastritis by EGD   Acute renal failure on CKD stage 3A: nephrology evaluating. Some concern for AIN  Breast cancer s/p right mastectomy now on hormone therapy  Morbid obesity, BMI 55  DVT/PTE on Pradaxa     PLAN/RECOMMENDATIONS:   - Follow-up pending paired blood culture from 6/29  - f/u pending E coli TRISHA data  - Follow CBC, BMP    Last dose of ceftriaxone today.  Given improvement in exam and dysuria, as well as some concern for AIN will reduce to 1 g daily.  Anticipate transition to oral antibiotics tomorrow pending sensitivity data, possibly renally dosed keflex vs levofloxacin depending on  nephrology's level of concern for AIN from cephalosporins.       Discussed with patient and daughter at bedside. Also with Dr Rendon. I will follow.        Sivakumar Felton MD  2024  10:11 EDT              Electronically signed by Sivakumar Felton MD at 24 1029       Brunner, Mark I, MD at 24 1647          Patient feels well today.  She has no further GI bleeding.  Hemoglobin stable.  Presumably, bleeding was from biting her tongue in the setting of supratherapeutic anticoagulation.    Please call with questions or concerns.        Electronically signed by Brunner, Mark I, MD at 24 1649       Viktor Morales MD at 24 1537              [facility] Medicine Services  PROGRESS NOTE    Patient Name: Moon Lerma  : 1955  MRN: [id number]    Date of Admission: 2024  Primary Care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

## 2024-07-01 NOTE — PROGRESS NOTES
" LOS: 12 days   Patient Care Team:  Carmen Singh PA-C as PCP - General (Physician Assistant)    Chief Complaint: Acute kidney injury anemia, GI bleed        Subjective   Fever resolved.  Infectious disease following.  Renal function remains poor unchanged.  Underwent EGD and colonoscopy few days back. Mild gastritis noted.         History taken from: patient  Patient denies shortness of breath, chest pain, dysuria, hematuria, nausea, vomiting.      Objective     Vital Sign Min/Max for last 24 hours  Temp  Min: 97.9 °F (36.6 °C)  Max: 99.9 °F (37.7 °C)   BP  Min: 116/49  Max: 152/100   Pulse  Min: 84  Max: 99   Resp  Min: 20  Max: 20   SpO2  Min: 95 %  Max: 96 %   No data recorded   No data recorded     Flowsheet Rows      Flowsheet Row First Filed Value   Admission Height 152.4 cm (60\") Documented at 06/19/2024 1315   Admission Weight 128 kg (282 lb 3 oz) Documented at 06/19/2024 1315            No intake/output data recorded.  I/O last 3 completed shifts:  In: 480 [P.O.:480]  Out: -     Vitals:    06/30/24 2344 07/01/24 0011 07/01/24 0424 07/01/24 0800   BP: 134/40 126/58 144/62 116/49   BP Location: Left arm  Left arm Left arm   Patient Position: Sitting  Lying Sitting   Pulse: 91 89 85 84   Resp: 20  20 20   Temp: 98.4 °F (36.9 °C)  98 °F (36.7 °C) 97.9 °F (36.6 °C)   TempSrc: Oral  Oral Oral   SpO2:  96%  96%   Weight:       Height:         General Appearance: Alert, oriented, no obvious distress.  Morbid obesity  Eyes: PER, EOMI.  Neck: Supple no JVD.  Lungs: Clear auscultation, no rales rhonchi's, equal chest movement, nonlabored.  Heart: No gallop, murmur, rub, RRR.  Abdomen: Soft, nontender, positive bowel sounds, obesity  Extremities: Positive lower extremity edema, no cyanosis.  Neuro: No focal deficit, moving all extremities, alert.  Following commands        Results Review:     I reviewed the patient's new clinical results.    WBC WBC   Date Value Ref Range Status   06/30/2024 13.93 (H) 3.40 - " "10.80 10*3/mm3 Final   06/29/2024 14.59 (H) 3.40 - 10.80 10*3/mm3 Final      HGB Hemoglobin   Date Value Ref Range Status   06/30/2024 7.3 (L) 12.0 - 15.9 g/dL Final   06/29/2024 7.4 (L) 12.0 - 15.9 g/dL Final      HCT Hematocrit   Date Value Ref Range Status   06/30/2024 23.3 (L) 34.0 - 46.6 % Final   06/29/2024 22.7 (L) 34.0 - 46.6 % Final      Platlets No results found for: \"LABPLAT\"   MCV MCV   Date Value Ref Range Status   06/30/2024 94.7 79.0 - 97.0 fL Final   06/29/2024 92.3 79.0 - 97.0 fL Final          Sodium Sodium   Date Value Ref Range Status   06/30/2024 134 (L) 136 - 145 mmol/L Final   06/29/2024 138 136 - 145 mmol/L Final      Potassium Potassium   Date Value Ref Range Status   06/30/2024 3.7 3.5 - 5.2 mmol/L Final   06/29/2024 3.6 3.5 - 5.2 mmol/L Final      Chloride Chloride   Date Value Ref Range Status   06/30/2024 101 98 - 107 mmol/L Final   06/29/2024 105 98 - 107 mmol/L Final      CO2 CO2   Date Value Ref Range Status   06/30/2024 18.0 (L) 22.0 - 29.0 mmol/L Final   06/29/2024 18.0 (L) 22.0 - 29.0 mmol/L Final      BUN BUN   Date Value Ref Range Status   06/30/2024 57 (H) 8 - 23 mg/dL Final   06/29/2024 55 (H) 8 - 23 mg/dL Final      Creatinine Creatinine   Date Value Ref Range Status   06/30/2024 5.39 (H) 0.57 - 1.00 mg/dL Final   06/29/2024 5.10 (H) 0.57 - 1.00 mg/dL Final      Calcium Calcium   Date Value Ref Range Status   06/30/2024 8.6 8.6 - 10.5 mg/dL Final   06/29/2024 8.8 8.6 - 10.5 mg/dL Final      PO4 No results found for: \"CAPO4\"   Albumin No results found for: \"ALBUMIN\"     Magnesium No results found for: \"MG\"     Uric Acid No results found for: \"URICACID\"     Medication Review: Yes    Assessment & Plan       Renal failure    Acute blood loss anemia    Hyperkalemia    Acute upper GI bleeding    Chronic deep vein thrombosis (DVT)    Primary hypertension    Chronic acquired lymphedema    Chronic cutaneous venous stasis ulcer    Acute GI bleeding    Results from last 7 days   Lab " Units 06/30/24  0828 06/29/24  0745 06/28/24  0740 06/27/24  1008   SODIUM mmol/L 134* 138 139 140   POTASSIUM mmol/L 3.7 3.6 3.7 4.0   CHLORIDE mmol/L 101 105 104 106   CO2 mmol/L 18.0* 18.0* 20.0* 24.0   BUN mg/dL 57* 55* 61* 64*   CREATININE mg/dL 5.39* 5.10* 5.12* 5.31*   CALCIUM mg/dL 8.6 8.8 9.1 9.0   WBC 10*3/mm3 13.93* 14.59* 5.90 5.83   HEMOGLOBIN g/dL 7.3* 7.4* 7.7* 7.6*   PLATELETS 10*3/mm3 114* 126* 146 129*       Intake/Output Summary (Last 24 hours) at 7/1/2024 1242  Last data filed at 6/30/2024 1500  Gross per 24 hour   Intake 360 ml   Output --   Net 360 ml       Assessment and plan:         1.  Acute kidney injury: Last known stable cr ~ 1.4mg/dl. Cr 6.7 mg/dl on admission. UA large alison esterase+ trace protein blood moderate ( Not a clean catch) UACR - 80.9. Non oliguric - DDx - Pre-quinten/ATN vs AIN - improving. ANCA serology negative except for borderline + atypical ANCA ( non specific).     2.  Hyperkalemia: In the setting of AWLTER:      3.  Met acidosis: Due to WALTER    4.  Severe anemia: FOBT+. On AC. S/p EGD and colonoscopy 6/28/24.      5.  DANIEL: Likely due to severe anemia     6.  Hx of breast cancer: On anastrozole     7.  Supra-therapeutic INR: On dabigatran. INR improving.     Recommendation:  Today's labs are pending difficult draw labs in the room to draw at this time.  -Serum sodium 134 will change lactated Ringer to normal saline with potassium.  Conservative care from renal standpoint.  Extensive discussion with family. If renal function continues to improve renal bx may not change the management at this stage.   Discussed with the son-in-law in the room.  Continue with current plan w outpatient f/u in 2 weeks after discharge. Patient would like to f/u with Dr. Deutsch in the office.         Adolfo Reynoso MD  07/01/24  12:42 EDT

## 2024-07-02 LAB
ABO GROUP BLD: NORMAL
ANION GAP SERPL CALCULATED.3IONS-SCNC: 15 MMOL/L (ref 5–15)
BACTERIA SPEC AEROBE CULT: ABNORMAL
BLD GP AB SCN SERPL QL: NEGATIVE
BUN SERPL-MCNC: 54 MG/DL (ref 8–23)
BUN/CREAT SERPL: 10.4 (ref 7–25)
CALCIUM SPEC-SCNC: 7.8 MG/DL (ref 8.6–10.5)
CHLORIDE SERPL-SCNC: 105 MMOL/L (ref 98–107)
CO2 SERPL-SCNC: 18 MMOL/L (ref 22–29)
CREAT SERPL-MCNC: 5.19 MG/DL (ref 0.57–1)
CYTO UR: NORMAL
DEPRECATED RDW RBC AUTO: 54.5 FL (ref 37–54)
EGFRCR SERPLBLD CKD-EPI 2021: 8.6 ML/MIN/1.73
ERYTHROCYTE [DISTWIDTH] IN BLOOD BY AUTOMATED COUNT: 16.3 % (ref 12.3–15.4)
GLUCOSE SERPL-MCNC: 96 MG/DL (ref 65–99)
GRAM STN SPEC: ABNORMAL
HCT VFR BLD AUTO: 21.5 % (ref 34–46.6)
HCT VFR BLD AUTO: 25.6 % (ref 34–46.6)
HGB BLD-MCNC: 7 G/DL (ref 12–15.9)
HGB BLD-MCNC: 8.2 G/DL (ref 12–15.9)
INR PPP: 1.43 (ref 0.89–1.12)
ISOLATED FROM: ABNORMAL
LAB AP CASE REPORT: NORMAL
LAB AP CLINICAL INFORMATION: NORMAL
MCH RBC QN AUTO: 29.7 PG (ref 26.6–33)
MCHC RBC AUTO-ENTMCNC: 32.6 G/DL (ref 31.5–35.7)
MCV RBC AUTO: 91.1 FL (ref 79–97)
PATH REPORT.FINAL DX SPEC: NORMAL
PATH REPORT.GROSS SPEC: NORMAL
PLATELET # BLD AUTO: 109 10*3/MM3 (ref 140–450)
PMV BLD AUTO: 12.9 FL (ref 6–12)
POTASSIUM SERPL-SCNC: 3.9 MMOL/L (ref 3.5–5.2)
PROTHROMBIN TIME: 17.5 SECONDS (ref 12.2–14.5)
RBC # BLD AUTO: 2.36 10*6/MM3 (ref 3.77–5.28)
RH BLD: POSITIVE
SODIUM SERPL-SCNC: 138 MMOL/L (ref 136–145)
T&S EXPIRATION DATE: NORMAL
WBC NRBC COR # BLD AUTO: 10.21 10*3/MM3 (ref 3.4–10.8)

## 2024-07-02 PROCEDURE — 85014 HEMATOCRIT: CPT | Performed by: STUDENT IN AN ORGANIZED HEALTH CARE EDUCATION/TRAINING PROGRAM

## 2024-07-02 PROCEDURE — 86850 RBC ANTIBODY SCREEN: CPT | Performed by: STUDENT IN AN ORGANIZED HEALTH CARE EDUCATION/TRAINING PROGRAM

## 2024-07-02 PROCEDURE — 86901 BLOOD TYPING SEROLOGIC RH(D): CPT | Performed by: STUDENT IN AN ORGANIZED HEALTH CARE EDUCATION/TRAINING PROGRAM

## 2024-07-02 PROCEDURE — 85018 HEMOGLOBIN: CPT | Performed by: STUDENT IN AN ORGANIZED HEALTH CARE EDUCATION/TRAINING PROGRAM

## 2024-07-02 PROCEDURE — P9016 RBC LEUKOCYTES REDUCED: HCPCS

## 2024-07-02 PROCEDURE — 85027 COMPLETE CBC AUTOMATED: CPT | Performed by: INTERNAL MEDICINE

## 2024-07-02 PROCEDURE — 86900 BLOOD TYPING SEROLOGIC ABO: CPT

## 2024-07-02 PROCEDURE — 99232 SBSQ HOSP IP/OBS MODERATE 35: CPT | Performed by: STUDENT IN AN ORGANIZED HEALTH CARE EDUCATION/TRAINING PROGRAM

## 2024-07-02 PROCEDURE — 25010000002 ERTAPENEM PER 500 MG: Performed by: INTERNAL MEDICINE

## 2024-07-02 PROCEDURE — 86900 BLOOD TYPING SEROLOGIC ABO: CPT | Performed by: STUDENT IN AN ORGANIZED HEALTH CARE EDUCATION/TRAINING PROGRAM

## 2024-07-02 PROCEDURE — 86923 COMPATIBILITY TEST ELECTRIC: CPT

## 2024-07-02 PROCEDURE — 80048 BASIC METABOLIC PNL TOTAL CA: CPT | Performed by: INTERNAL MEDICINE

## 2024-07-02 PROCEDURE — 36430 TRANSFUSION BLD/BLD COMPNT: CPT

## 2024-07-02 PROCEDURE — 85610 PROTHROMBIN TIME: CPT | Performed by: INTERNAL MEDICINE

## 2024-07-02 RX ORDER — LIDOCAINE 50 MG/G
1 OINTMENT TOPICAL AS NEEDED
Status: DISCONTINUED | OUTPATIENT
Start: 2024-07-02 | End: 2024-07-03 | Stop reason: HOSPADM

## 2024-07-02 RX ORDER — PANTOPRAZOLE SODIUM 40 MG/1
40 TABLET, DELAYED RELEASE ORAL
Status: DISCONTINUED | OUTPATIENT
Start: 2024-07-03 | End: 2024-07-03 | Stop reason: HOSPADM

## 2024-07-02 RX ORDER — CEFDINIR 300 MG/1
300 CAPSULE ORAL
Status: DISCONTINUED | OUTPATIENT
Start: 2024-07-03 | End: 2024-07-03 | Stop reason: HOSPADM

## 2024-07-02 RX ADMIN — DIPHENHYDRAMINE HYDROCHLORIDE AND LIDOCAINE HYDROCHLORIDE AND ALUMINUM HYDROXIDE AND MAGNESIUM HYDRO 5 ML: KIT at 20:32

## 2024-07-02 RX ADMIN — DIPHENHYDRAMINE HYDROCHLORIDE AND LIDOCAINE HYDROCHLORIDE AND ALUMINUM HYDROXIDE AND MAGNESIUM HYDRO 5 ML: KIT at 15:24

## 2024-07-02 RX ADMIN — APIXABAN 2.5 MG: 2.5 TABLET, FILM COATED ORAL at 08:51

## 2024-07-02 RX ADMIN — FOLIC ACID 1 MG: 1 TABLET ORAL at 08:51

## 2024-07-02 RX ADMIN — ERTAPENEM 500 MG: 1 INJECTION INTRAMUSCULAR; INTRAVENOUS at 09:49

## 2024-07-02 RX ADMIN — Medication 10 ML: at 20:31

## 2024-07-02 RX ADMIN — ANASTROZOLE 1 MG: 1 TABLET ORAL at 08:52

## 2024-07-02 RX ADMIN — LIDOCAINE 1 APPLICATION: 50 OINTMENT TOPICAL at 15:24

## 2024-07-02 RX ADMIN — TERAZOSIN HYDROCHLORIDE 2 MG: 2 CAPSULE ORAL at 20:30

## 2024-07-02 RX ADMIN — Medication 10 ML: at 08:54

## 2024-07-02 RX ADMIN — APIXABAN 2.5 MG: 2.5 TABLET, FILM COATED ORAL at 20:30

## 2024-07-02 RX ADMIN — NEBIVOLOL 2.5 MG: 2.5 TABLET ORAL at 08:52

## 2024-07-02 RX ADMIN — PANTOPRAZOLE SODIUM 40 MG: 40 INJECTION, POWDER, FOR SOLUTION INTRAVENOUS at 08:51

## 2024-07-02 NOTE — PLAN OF CARE
Problem: Adult Inpatient Plan of Care  Goal: Plan of Care Review  Outcome: Ongoing, Progressing  Goal: Patient-Specific Goal (Individualized)  Outcome: Ongoing, Progressing  Goal: Absence of Hospital-Acquired Illness or Injury  Outcome: Ongoing, Progressing  Intervention: Identify and Manage Fall Risk  Recent Flowsheet Documentation  Taken 7/2/2024 0852 by Frederick Us RN  Safety Promotion/Fall Prevention:   activity supervised   clutter free environment maintained   assistive device/personal items within reach   fall prevention program maintained   lighting adjusted   nonskid shoes/slippers when out of bed   room organization consistent   safety round/check completed  Intervention: Prevent Skin Injury  Recent Flowsheet Documentation  Taken 7/2/2024 1600 by Frederick Us RN  Body Position:   weight shifting   neutral body alignment   legs elevated  Taken 7/2/2024 1400 by Frederick Us RN  Body Position: patient/family refused  Taken 7/2/2024 1200 by Frederick Us RN  Body Position:   neutral body alignment   weight shifting  Taken 7/2/2024 1000 by Frederick Us RN  Body Position: patient/family refused  Taken 7/2/2024 0852 by Frederick Us RN  Body Position:   weight shifting   patient/family refused  Skin Protection:   adhesive use limited   incontinence pads utilized   silicone foam dressing in place   skin-to-skin areas padded   transparent dressing maintained   tubing/devices free from skin contact  Taken 7/2/2024 0800 by Frederick Us RN  Body Position: patient/family refused  Intervention: Prevent and Manage VTE (Venous Thromboembolism) Risk  Recent Flowsheet Documentation  Taken 7/2/2024 1600 by Frederick Us RN  Activity Management: up in chair  Taken 7/2/2024 0852 by Frederick Us RN  Activity Management: activity encouraged  VTE Prevention/Management: (see MAR) other (see comments)  Range of Motion: active ROM (range of motion)  encouraged  Intervention: Prevent Infection  Recent Flowsheet Documentation  Taken 7/2/2024 1600 by Frederick Us RN  Infection Prevention:   environmental surveillance performed   equipment surfaces disinfected   hand hygiene promoted  Taken 7/2/2024 1200 by Frederick Us RN  Infection Prevention:   environmental surveillance performed   equipment surfaces disinfected   hand hygiene promoted  Taken 7/2/2024 0852 by Frederick Us RN  Infection Prevention:   environmental surveillance performed   hand hygiene promoted   equipment surfaces disinfected  Goal: Optimal Comfort and Wellbeing  Outcome: Ongoing, Progressing  Intervention: Monitor Pain and Promote Comfort  Recent Flowsheet Documentation  Taken 7/2/2024 1600 by Frederick Us RN  Pain Management Interventions:   care clustered   pillow support provided   position adjusted  Taken 7/2/2024 1200 by Frederick Us RN  Pain Management Interventions:   care clustered   pillow support provided   position adjusted  Taken 7/2/2024 0852 by Frederick Us RN  Pain Management Interventions:   pillow support provided   care clustered  Intervention: Provide Person-Centered Care  Recent Flowsheet Documentation  Taken 7/2/2024 0852 by Frederick Us RN  Trust Relationship/Rapport:   care explained   choices provided   emotional support provided   empathic listening provided   questions answered   questions encouraged   reassurance provided   thoughts/feelings acknowledged  Goal: Readiness for Transition of Care  Outcome: Ongoing, Progressing     Problem: Skin Injury Risk Increased  Goal: Skin Health and Integrity  Outcome: Ongoing, Progressing  Intervention: Optimize Skin Protection  Recent Flowsheet Documentation  Taken 7/2/2024 1600 by Frederick Us RN  Head of Bed (HOB) Positioning: HOB at 30-45 degrees  Taken 7/2/2024 1200 by Frederick Us RN  Head of Bed (HOB) Positioning: HOB at 30-45 degrees  Taken 7/2/2024 0852 by  Frederick Us, RN  Pressure Reduction Techniques:   frequent weight shift encouraged   pressure points protected   rest period provided between sit times   weight shift assistance provided  Head of Bed (HOB) Positioning: HOB elevated  Pressure Reduction Devices: (refuses foam dressings and chair cushion) --  Skin Protection:   adhesive use limited   incontinence pads utilized   silicone foam dressing in place   skin-to-skin areas padded   transparent dressing maintained   tubing/devices free from skin contact     Problem: Fall Injury Risk  Goal: Absence of Fall and Fall-Related Injury  Outcome: Ongoing, Progressing  Intervention: Identify and Manage Contributors  Recent Flowsheet Documentation  Taken 7/2/2024 0852 by Frederick Us RN  Medication Review/Management: medications reviewed  Self-Care Promotion:   independence encouraged   BADL personal objects within reach   BADL personal routines maintained  Intervention: Promote Injury-Free Environment  Recent Flowsheet Documentation  Taken 7/2/2024 0852 by Frederick Us, RN  Safety Promotion/Fall Prevention:   activity supervised   clutter free environment maintained   assistive device/personal items within reach   fall prevention program maintained   lighting adjusted   nonskid shoes/slippers when out of bed   room organization consistent   safety round/check completed   Goal Outcome Evaluation:

## 2024-07-02 NOTE — PROGRESS NOTES
Moon Lerma  1955  [id number]    Date of Consult: 7/2/2024  Admission Date: 6/19/2024    Reason for Consultation: Fever    History of present illness:    Patient is a 68 y.o. female, with PMH breast cancer( now on hormone therapy) PE/DVT ( on Pradaxa)  HTN, chronic lymphedema, seen today for an Ecoli UTI. She is here from [place] visiting family, and developed shortness of breath prompting presentation to the ED.  She had been recently treated for RLE cellulitis with a course of oral antibiotics( Cefpodoxime and Augmentin ) in [place] prior to traveling with improvement.  She bit her tongue one week ago, causing an ulceration, then began to notice dark stools, fatigue. She had been seen by a new PCP day of admission, referred to the ED. Admitting labs with hgb 5.8, Scr 6.7, PT 82.1, WBC 12, proBNP 3437, UA TNTC WBC, urine culture with Ecoli, PCT 2.61. CXR without acute abnormality.  CT A/P without acute abdominopelvic findings. Renal US without evidence of hydronephrosis.  Recent cxr with small pleural effusions, and mild atelectasis.  Underwent EGD/colonoscopy 6/28 with mild gastritis, normal colonoscopy.  Temperature last pm 102.2 and we were consulted for evaluation and treatment.  Currently on Rocephin. She does complain of burning with urination. No cough, increased sputum production, nausea, abdominal pain, diarrhea.  Right leg is more red than baseline but left is currently at baseline.  Still with some dysuria.  Son at bedside assisted with translation.    7/1/2024: Afebrile overnight.  Feeling better.  Dysuria resolved.  Appetite okay.  No further blood in stool.  Leg swelling chronic and stable.  She says she feels well enough to go home    7/2/24: Afebrile overnight.  Continues to feel well.  Denies having any dysuria, suprapubic pain, bladder spasms or flank pain.  Leg swelling stable. Hopeful for discharge home.     ROS:   Afebrile and hemodynamically stable. No nausea, diarrhea. No rash. See  above, otherwise negative.      No Known Allergies      Medication:    Current Facility-Administered Medications:     acetaminophen (TYLENOL) tablet 650 mg, 650 mg, Oral, Q6H PRN, Brunner, Mark I, MD, 650 mg at 06/29/24 2129    anastrozole (ARIMIDEX) tablet 1 mg, 1 mg, Oral, Daily, Viktor Morales MD, 1 mg at 07/02/24 0852    apixaban (ELIQUIS) tablet 2.5 mg, 2.5 mg, Oral, Q12H, Viktor Morales MD, 2.5 mg at 07/02/24 0851    Biotene dry mouth liquid 15 mL, 15 mL, Swish & Spit, 5x Daily PRN, Brunner, Mark I, MD    sennosides-docusate (PERICOLACE) 8.6-50 MG per tablet 2 tablet, 2 tablet, Oral, BID PRN **AND** polyethylene glycol (MIRALAX) packet 17 g, 17 g, Oral, Daily PRN **AND** bisacodyl (DULCOLAX) EC tablet 5 mg, 5 mg, Oral, Daily PRN **AND** bisacodyl (DULCOLAX) suppository 10 mg, 10 mg, Rectal, Daily PRN, Brunner, Mark I, MD    First Mouthwash (Magic Mouthwash) 5 mL, 5 mL, Swish & Spit, Q8H, Brunner, Mark I, MD, 5 mL at 07/01/24 2159    folic acid (FOLVITE) tablet 1 mg, 1 mg, Oral, Daily, Viktor Morales MD, 1 mg at 07/02/24 0851    nebivolol (BYSTOLIC) tablet 2.5 mg, 2.5 mg, Oral, Q24H, Brunner, Mark I, MD, 2.5 mg at 07/02/24 0852    ondansetron (ZOFRAN) injection 4 mg, 4 mg, Intravenous, Q6H PRN, Viktor Morales MD, 4 mg at 06/29/24 1532    oxymetazoline (AFRIN) nasal spray 2 spray, 2 spray, Each Nare, BID PRN, Brunner, Mark I, MD    pantoprazole (PROTONIX) injection 40 mg, 40 mg, Intravenous, QAM AC, Viktor Morales MD, 40 mg at 07/02/24 0851    sodium chloride 0.9 % flush 10 mL, 10 mL, Intravenous, PRN, Brunner, Mark I, MD    sodium chloride 0.9 % flush 10 mL, 10 mL, Intravenous, Q12H, Brunner, Mark I, MD, 10 mL at 07/02/24 0854    sodium chloride 0.9 % flush 10 mL, 10 mL, Intravenous, PRN, Brunner, Mark I, MD    sodium chloride 0.9 % infusion 40 mL, 40 mL, Intravenous, PRN, Brunner, Mark I, MD    sodium chloride 0.9 % with KCl 20 mEq/L infusion, 50 mL/hr, Intravenous, Continuous, Adolfo Reynoso MD,  Last Rate: 50 mL/hr at 24 1733, 50 mL/hr at 24 1733    sodium chloride nasal spray 1 spray, 1 spray, Each Nare, PRN, Brunner, Mark I, MD    terazosin (HYTRIN) capsule 2 mg, 2 mg, Oral, Nightly, Brunner, Mark I, MD, 2 mg at 24 2158    Antibiotics:  Anti-Infectives (From admission, onward)      Ordered     Dose/Rate Route Frequency Start Stop    24 0837  ertapenem (INVanz) 500 mg in sodium chloride 0.9 % 50 mL IVPB        Ordering Provider: Sivakumar Felton MD    500 mg  100 mL/hr over 30 Minutes Intravenous Once 24 0930 24 1019    24 1018  cefTRIAXone (ROCEPHIN) 1,000 mg in sodium chloride 0.9 % 100 mL MBP        Ordering Provider: Sivakumar Felton MD    1,000 mg  200 mL/hr over 30 Minutes Intravenous Every 24 Hours 24 1400 24 1452            Physical Exam:   Vital Signs  Temp (24hrs), Av.1 °F (36.7 °C), Min:98 °F (36.7 °C), Max:98.2 °F (36.8 °C)    Temp  Min: 98 °F (36.7 °C)  Max: 98.2 °F (36.8 °C)  BP  Min: 110/68  Max: 130/89  Pulse  Min: 87  Max: 89  Resp  Min: 20  Max: 22  SpO2  Min: 99 %  Max: 100 %    GENERAL: Awake and alert, in no acute distress. Up in chair   HEENT: Normocephalic, atraumatic.  PERRL. EOMI. No conjunctival injection. No icterus.    NECK: Supple  HEART: RRR; No murmur,   LUNGS: Clear to auscultation bilaterally without wheezing, rales, rhonchi. Normal respiratory effort. Nonlabored.    ABDOMEN: obese. Soft, nontender, nondistended.    EXT:  bilateral lower extremity swelling improved slightly   :  Without Gonzalez catheter.  MSK: No joint effusions or erythema  SKIN: no diffuse rash. Faint erythema RLE, improved. LLE with chronic venous stasis hyperpigmentation  NEURO: Oriented to PPT.  Motor 5/5 strength  PSYCHIATRIC: Normal insight and judgment. Cooperative with PE    Laboratory Data    Results from last 7 days   Lab Units 24  0512 24  1423 24  0828   WBC 10*3/mm3 10.21 12.24* 13.93*   HEMOGLOBIN g/dL 7.0*  7.2* 7.3*   HEMATOCRIT % 21.5* 22.9* 23.3*   PLATELETS 10*3/mm3 109* 115* 114*     Results from last 7 days   Lab Units 07/02/24  0512   SODIUM mmol/L 138   POTASSIUM mmol/L 3.9   CHLORIDE mmol/L 105   CO2 mmol/L 18.0*   BUN mg/dL 54*   CREATININE mg/dL 5.19*   GLUCOSE mg/dL 96   CALCIUM mg/dL 7.8*     Results from last 7 days   Lab Units 07/01/24  1250   ALK PHOS U/L 54   BILIRUBIN mg/dL 0.4   BILIRUBIN DIRECT mg/dL 0.2   ALT (SGPT) U/L 17   AST (SGOT) U/L 46*         Results from last 7 days   Lab Units 07/01/24  1250   CRP mg/dL 15.58*         Results from last 7 days   Lab Units 06/30/24  0828   CK TOTAL U/L 1,233*         Estimated Creatinine Clearance: 13 mL/min (A) (by C-G formula based on SCr of 5.19 mg/dL (H)).      Microbiology:  Microbiology Results (last 10 days)       Procedure Component Value - Date/Time    MRSA Screen, PCR (Inpatient) - Swab, Nares [[id number]]  (Normal) Collected: 06/30/24 1608    Lab Status: Final result Specimen: Swab from Nares Updated: 06/30/24 1732     MRSA PCR Negative    Narrative:      The negative predictive value of this diagnostic test is high and should only be used to consider de-escalating anti-MRSA therapy. A positive result may indicate colonization with MRSA and must be correlated clinically.  MRSA Negative    Blood Culture - Blood, Wrist, Left [[id number]]  (Abnormal) Collected: 06/29/24 1825    Lab Status: Final result Specimen: Blood from Wrist, Left Updated: 07/02/24 0648     Blood Culture Staphylococcus, coagulase negative     Isolated from Anaerobic Bottle     Gram Stain Anaerobic Bottle Gram positive cocci in clusters    Narrative:      Probable contaminant requires clinical correlation, susceptibility not performed unless requested by physician.      Blood Culture ID, PCR - Blood, Wrist, Left [[id number]]  (Abnormal) Collected: 06/29/24 1825    Lab Status: Final result Specimen: Blood from Wrist, Left Updated: 06/30/24 2145     BCID, PCR Staph spp, not aureus or  luisis. Identification by BCID2 PCR.     BOTTLE TYPE Anaerobic Bottle    Blood Culture - Blood, Arm, Left [[id number]]  (Normal) Collected: 06/29/24 1808    Lab Status: Preliminary result Specimen: Blood from Arm, Left Updated: 07/01/24 1931     Blood Culture No growth at 2 days    Urine Culture - Urine, Urine, Clean Catch [[id number]]  (Abnormal)  (Susceptibility) Collected: 06/29/24 1534    Lab Status: Final result Specimen: Urine, Clean Catch Updated: 07/01/24 1110     Urine Culture 50,000 CFU/mL Escherichia coli ESBL     Comment:   Consider infectious disease consult.  Susceptibility results may not correlate to clinical outcomes.       Narrative:      Colonization of the urinary tract without infection is common. Treatment is discouraged unless the patient is symptomatic, pregnant, or undergoing an invasive urologic procedure.  Recent outcomes data supports the use of pip/tazo in the treatment of susceptible ESBL infections for uncomplicated UTI. Consider use of pip/tazo as a carbapenem-sparing regimen in applicable patients.    Susceptibility        Escherichia coli ESBL      TRISHA      Amikacin Intermediate      Ertapenem Susceptible      Gentamicin Resistant      Levofloxacin Resistant      Meropenem Susceptible      Nitrofurantoin Intermediate      Piperacillin + Tazobactam Susceptible      Tobramycin Resistant      Trimethoprim + Sulfamethoxazole Susceptible                           Respiratory Panel PCR w/COVID-19(SARS-CoV-2) MEDARDO/BRYCE/RAKESH/PAD/COR/AURELIO In-House, NP Swab in UTM/VTM, 2 HR TAT - Swab, Nasopharynx [[id number]]  (Normal) Collected: 06/29/24 1206    Lab Status: Final result Specimen: Swab from Nasopharynx Updated: 06/29/24 1257     ADENOVIRUS, PCR Not Detected     Coronavirus 229E Not Detected     Coronavirus HKU1 Not Detected     Coronavirus NL63 Not Detected     Coronavirus OC43 Not Detected     COVID19 Not Detected     Human Metapneumovirus Not Detected     Human Rhinovirus/Enterovirus Not  Detected     Influenza A PCR Not Detected     Influenza B PCR Not Detected     Parainfluenza Virus 1 Not Detected     Parainfluenza Virus 2 Not Detected     Parainfluenza Virus 3 Not Detected     Parainfluenza Virus 4 Not Detected     RSV, PCR Not Detected     Bordetella pertussis pcr Not Detected     Bordetella parapertussis PCR Not Detected     Chlamydophila pneumoniae PCR Not Detected     Mycoplasma pneumo by PCR Not Detected    Narrative:      In the setting of a positive respiratory panel with a viral infection PLUS a negative procalcitonin without other underlying concern for bacterial infection, consider observing off antibiotics or discontinuation of antibiotics and continue supportive care. If the respiratory panel is positive for atypical bacterial infection (Bordetella pertussis, Chlamydophila pneumoniae, or Mycoplasma pneumoniae), consider antibiotic de-escalation to target atypical bacterial infection.            Radiology:  Imaging Results (Last 72 Hours)       Procedure Component Value Units Date/Time    XR Chest 1 View [[id number]] Collected: 06/29/24 1214     Updated: 06/29/24 1220    Narrative:      XR CHEST 1 VW    Date of Exam: 6/29/2024 11:13 AM EDT    Indication: fever, unclear source    Comparison: 6/19/2024    Findings:  Cardiomegaly. Small left greater than right pleural effusions with associated mild bibasilar opacities. No pneumothorax. No evidence of acute osseous abnormalities. Visualized upper abdomen is unremarkable.      Impression:      Impression:  Small left greater than right pleural effusions with associated mild bibasilar opacities.      Electronically Signed: Av Murguia MD    6/29/2024 12:17 PM EDT    Workstation ID: [id number]               Impression:   Fever, sepsis: likely due to UTI alone, although patient improved on ceftriaxone and isolate was ESBL in vitro.  RLE cellulitis could also have contributed but mild on exam. Blood cx negative. WBC back to normal and  afebrile for past 2 days.   E coli UTI,   resolved quickly on ceftriaxone although isolate was ESBL in vitro.  So either the isolate was more of a colonizer or may be the ESBL gene was not being expressed at a significant level in vivo.  RLE cellulitis: In setting of chronic venous stasis dermatitis.  Had some increased erythema on the right compared to baseline. No DVT on US. Improved over past 2 days.   Chronic bilateral lower extremity venous stasis dermatitis and hyperpigmentation  Chronic LLE DVT and hx of PE  Bibasilar pulmonary infiltrate: seen on CXR. Most likely atelectasis versus edema.  No significant respiratory symptoms and not currently hypoxic on room  Single blood culture positive for coagulase-negative staph, likely skin contaminant: paired culture negative so far  GI bleed, normal colonoscopy, mild gastritis by EGD   Acute renal failure on CKD stage 3A: nephrology evaluating. Some concern for AIN per nephrology but unable to verify without biopsy. Creatinine stable over past 5 days.  I discussed her complex case with Dr. Reynoso: Okay with short course of cephalosporins if needed   Breast cancer s/p right mastectomy now on hormone therapy  Morbid obesity, BMI 55  DVT/PTE on Pradaxa     PLAN/RECOMMENDATIONS:   Follow-up pending paired blood culture from 6/29, so far still negative    Single dose of renally dosed ertapenem given this morning due to ESBL isolate but patient remains asymptomatic in terms of UTI.  Symptoms resolved with third-generation cephalosporin treatment only so I do not think she needs further doses of ertapenem.  Transition to oral cefdinir for 3 more days for coverage of UTI and RLE cellulitis.     Discussed with patient's daughter at bedside that if she develops recurrent UTI symptoms she would likely need daily dosing of IV/IM ertapenem for a few days. This can be done at [facility] office, although would have to get billing staff to look at coverage from her travelers insurance.   Paying cash out-of-pocket is also an option.      Discussed with patient and daughter at bedside. Also with Dr Reynoso.  Okay to discharge from my perspective.  Call [facility] at [phone] if any new concerns develop after discharge.       Sivakumar Felton MD  7/2/2024  10:26 EDT
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
## 2024-07-03 ENCOUNTER — READMISSION MANAGEMENT (OUTPATIENT)
Dept: CALL CENTER | Facility: HOSPITAL | Age: 69
End: 2024-07-03
Payer: COMMERCIAL

## 2024-07-03 VITALS
BODY MASS INDEX: 55.36 KG/M2 | RESPIRATION RATE: 20 BRPM | SYSTOLIC BLOOD PRESSURE: 130 MMHG | HEIGHT: 60 IN | OXYGEN SATURATION: 93 % | TEMPERATURE: 98.6 F | HEART RATE: 90 BPM | WEIGHT: 282 LBS | DIASTOLIC BLOOD PRESSURE: 83 MMHG

## 2024-07-03 LAB
ANION GAP SERPL CALCULATED.3IONS-SCNC: 13 MMOL/L (ref 5–15)
BH BB BLOOD EXPIRATION DATE: NORMAL
BH BB BLOOD TYPE BARCODE: 5100
BH BB DISPENSE STATUS: NORMAL
BH BB PRODUCT CODE: NORMAL
BH BB UNIT NUMBER: NORMAL
BUN SERPL-MCNC: 47 MG/DL (ref 8–23)
BUN/CREAT SERPL: 9.2 (ref 7–25)
CALCIUM SPEC-SCNC: 7.6 MG/DL (ref 8.6–10.5)
CHLORIDE SERPL-SCNC: 104 MMOL/L (ref 98–107)
CO2 SERPL-SCNC: 17 MMOL/L (ref 22–29)
CREAT SERPL-MCNC: 5.12 MG/DL (ref 0.57–1)
CROSSMATCH INTERPRETATION: NORMAL
DEPRECATED RDW RBC AUTO: 57.4 FL (ref 37–54)
EGFRCR SERPLBLD CKD-EPI 2021: 8.7 ML/MIN/1.73
ERYTHROCYTE [DISTWIDTH] IN BLOOD BY AUTOMATED COUNT: 16.9 % (ref 12.3–15.4)
GLUCOSE SERPL-MCNC: 103 MG/DL (ref 65–99)
HCT VFR BLD AUTO: 24.5 % (ref 34–46.6)
HGB BLD-MCNC: 7.9 G/DL (ref 12–15.9)
INR PPP: 1.55 (ref 0.89–1.12)
MCH RBC QN AUTO: 29.9 PG (ref 26.6–33)
MCHC RBC AUTO-ENTMCNC: 32.2 G/DL (ref 31.5–35.7)
MCV RBC AUTO: 92.8 FL (ref 79–97)
PLATELET # BLD AUTO: 131 10*3/MM3 (ref 140–450)
PMV BLD AUTO: 12.2 FL (ref 6–12)
POTASSIUM SERPL-SCNC: 3.6 MMOL/L (ref 3.5–5.2)
PROTHROMBIN TIME: 18.7 SECONDS (ref 12.2–14.5)
RBC # BLD AUTO: 2.64 10*6/MM3 (ref 3.77–5.28)
SODIUM SERPL-SCNC: 134 MMOL/L (ref 136–145)
UNIT  ABO: NORMAL
UNIT  RH: NORMAL
WBC NRBC COR # BLD AUTO: 10.07 10*3/MM3 (ref 3.4–10.8)

## 2024-07-03 PROCEDURE — 99239 HOSP IP/OBS DSCHRG MGMT >30: CPT | Performed by: STUDENT IN AN ORGANIZED HEALTH CARE EDUCATION/TRAINING PROGRAM

## 2024-07-03 PROCEDURE — 85610 PROTHROMBIN TIME: CPT | Performed by: INTERNAL MEDICINE

## 2024-07-03 PROCEDURE — 80048 BASIC METABOLIC PNL TOTAL CA: CPT | Performed by: INTERNAL MEDICINE

## 2024-07-03 PROCEDURE — 85027 COMPLETE CBC AUTOMATED: CPT | Performed by: INTERNAL MEDICINE

## 2024-07-03 RX ORDER — CEFDINIR 300 MG/1
300 CAPSULE ORAL
Qty: 2 CAPSULE | Refills: 0 | Status: SHIPPED | OUTPATIENT
Start: 2024-07-04 | End: 2024-07-06

## 2024-07-03 RX ORDER — NEBIVOLOL 2.5 MG/1
2.5 TABLET ORAL
Qty: 30 TABLET | Refills: 0 | Status: SHIPPED | OUTPATIENT
Start: 2024-07-04

## 2024-07-03 RX ORDER — FOLIC ACID 1 MG/1
1 TABLET ORAL DAILY
Qty: 30 TABLET | Refills: 0 | Status: SHIPPED | OUTPATIENT
Start: 2024-07-04

## 2024-07-03 RX ORDER — PANTOPRAZOLE SODIUM 40 MG/1
40 TABLET, DELAYED RELEASE ORAL
Qty: 30 TABLET | Refills: 0 | Status: SHIPPED | OUTPATIENT
Start: 2024-07-04

## 2024-07-03 RX ADMIN — Medication 10 ML: at 08:25

## 2024-07-03 RX ADMIN — NEBIVOLOL 2.5 MG: 2.5 TABLET ORAL at 08:25

## 2024-07-03 RX ADMIN — CEFDINIR 300 MG: 300 CAPSULE ORAL at 08:25

## 2024-07-03 RX ADMIN — FOLIC ACID 1 MG: 1 TABLET ORAL at 08:25

## 2024-07-03 RX ADMIN — APIXABAN 2.5 MG: 2.5 TABLET, FILM COATED ORAL at 08:25

## 2024-07-03 RX ADMIN — PANTOPRAZOLE SODIUM 40 MG: 40 TABLET, DELAYED RELEASE ORAL at 05:16

## 2024-07-03 RX ADMIN — ANASTROZOLE 1 MG: 1 TABLET ORAL at 08:25

## 2024-07-03 RX ADMIN — DIPHENHYDRAMINE HYDROCHLORIDE AND LIDOCAINE HYDROCHLORIDE AND ALUMINUM HYDROXIDE AND MAGNESIUM HYDRO 5 ML: KIT at 05:16

## 2024-07-03 NOTE — OUTREACH NOTE
Prep Survey      Flowsheet Row Responses   [facility] patient discharged from? [place]   Is LACE score < 7 ? No   Eligibility [facility]   Date of Admission 06/19/24   Date of Discharge 07/03/24   Discharge Disposition Home or Self Care   Discharge diagnosis Renal failure   Does the patient have one of the following disease processes/diagnoses(primary or secondary)? Other   Prep survey completed? Yes            Elida SALGUERO - Registered Nurse

## 2024-07-03 NOTE — PROGRESS NOTES
" LOS: 13 days   Patient Care Team:  Carmen Singh PA-C as PCP - General (Physician Assistant)    Chief Complaint: Acute kidney injury anemia, GI bleed        Subjective   On abx for possible ESBL UTI. S/p1 unit PRBC. Tara any bleeding. Cr 5.1 GFR 8. No significant improvement in last few days. Clinically doing well.          History taken from: patient  Patient denies shortness of breath, chest pain, dysuria, hematuria, nausea, vomiting.      Objective     Vital Sign Min/Max for last 24 hours  Temp  Min: 96.4 °F (35.8 °C)  Max: 98.2 °F (36.8 °C)   BP  Min: 98/65  Max: 137/89   Pulse  Min: 77  Max: 98   Resp  Min: 16  Max: 26   SpO2  Min: 96 %  Max: 100 %   No data recorded   No data recorded     Flowsheet Rows      Flowsheet Row First Filed Value   Admission Height 152.4 cm (60\") Documented at 06/19/2024 1315   Admission Weight 128 kg (282 lb 3 oz) Documented at 06/19/2024 1315            No intake/output data recorded.  I/O last 3 completed shifts:  In: 1341.3 [P.O.:960; Blood:381.3]  Out: -     Vitals:    07/02/24 1209 07/02/24 1224 07/02/24 1449 07/02/24 2035   BP: 107/61 98/65 120/93 137/89   BP Location:    Left arm   Patient Position:    Sitting   Pulse: 77 90 86 98   Resp: 18 16 18 26   Temp: 97.8 °F (36.6 °C) 97.1 °F (36.2 °C) 98.2 °F (36.8 °C) 98 °F (36.7 °C)   TempSrc: Oral Oral Oral Oral   SpO2: 96% 98% 97% 99%   Weight:       Height:         General Appearance: Alert, oriented, no obvious distress.  Morbid obesity  Eyes: PER, EOMI.  Neck: Supple no JVD.  Lungs: Clear auscultation, no rales rhonchi's, equal chest movement, nonlabored.  Heart: No gallop, murmur, rub, RRR.  Abdomen: Soft, nontender, positive bowel sounds, obesity  Extremities: Positive lower extremity edema, no cyanosis.  Neuro: No focal deficit, moving all extremities, alert.  Following commands        Results Review:     I reviewed the patient's new clinical results.    WBC WBC   Date Value Ref Range Status   07/02/2024 10.21 " "3.40 - 10.80 10*3/mm3 Final   07/01/2024 12.24 (H) 3.40 - 10.80 10*3/mm3 Final   06/30/2024 13.93 (H) 3.40 - 10.80 10*3/mm3 Final      HGB Hemoglobin   Date Value Ref Range Status   07/02/2024 8.2 (L) 12.0 - 15.9 g/dL Final   07/02/2024 7.0 (L) 12.0 - 15.9 g/dL Final   07/01/2024 7.2 (L) 12.0 - 15.9 g/dL Final   06/30/2024 7.3 (L) 12.0 - 15.9 g/dL Final      HCT Hematocrit   Date Value Ref Range Status   07/02/2024 25.6 (L) 34.0 - 46.6 % Final   07/02/2024 21.5 (L) 34.0 - 46.6 % Final   07/01/2024 22.9 (L) 34.0 - 46.6 % Final   06/30/2024 23.3 (L) 34.0 - 46.6 % Final      Platlets No results found for: \"LABPLAT\"   MCV MCV   Date Value Ref Range Status   07/02/2024 91.1 79.0 - 97.0 fL Final   07/01/2024 95.4 79.0 - 97.0 fL Final   06/30/2024 94.7 79.0 - 97.0 fL Final          Sodium Sodium   Date Value Ref Range Status   07/02/2024 138 136 - 145 mmol/L Final   07/01/2024 136 136 - 145 mmol/L Final   06/30/2024 134 (L) 136 - 145 mmol/L Final      Potassium Potassium   Date Value Ref Range Status   07/02/2024 3.9 3.5 - 5.2 mmol/L Final     Comment:     Slight hemolysis detected by analyzer. Result may be falsely elevated.   07/01/2024 3.9 3.5 - 5.2 mmol/L Final   06/30/2024 3.7 3.5 - 5.2 mmol/L Final      Chloride Chloride   Date Value Ref Range Status   07/02/2024 105 98 - 107 mmol/L Final   07/01/2024 103 98 - 107 mmol/L Final   06/30/2024 101 98 - 107 mmol/L Final      CO2 CO2   Date Value Ref Range Status   07/02/2024 18.0 (L) 22.0 - 29.0 mmol/L Final   07/01/2024 17.0 (L) 22.0 - 29.0 mmol/L Final   06/30/2024 18.0 (L) 22.0 - 29.0 mmol/L Final      BUN BUN   Date Value Ref Range Status   07/02/2024 54 (H) 8 - 23 mg/dL Final   07/01/2024 55 (H) 8 - 23 mg/dL Final   06/30/2024 57 (H) 8 - 23 mg/dL Final      Creatinine Creatinine   Date Value Ref Range Status   07/02/2024 5.19 (H) 0.57 - 1.00 mg/dL Final   07/01/2024 5.24 (H) 0.57 - 1.00 mg/dL Final   06/30/2024 5.39 (H) 0.57 - 1.00 mg/dL Final      Calcium Calcium " "  Date Value Ref Range Status   07/02/2024 7.8 (L) 8.6 - 10.5 mg/dL Final   07/01/2024 7.8 (L) 8.6 - 10.5 mg/dL Final   06/30/2024 8.6 8.6 - 10.5 mg/dL Final      PO4 No results found for: \"CAPO4\"   Albumin Albumin   Date Value Ref Range Status   07/01/2024 3.1 (L) 3.5 - 5.2 g/dL Final        Magnesium No results found for: \"MG\"     Uric Acid No results found for: \"URICACID\"     Medication Review: Yes    Assessment & Plan       Renal failure    Acute blood loss anemia    Hyperkalemia    Acute upper GI bleeding    Chronic deep vein thrombosis (DVT)    Primary hypertension    Chronic acquired lymphedema    Chronic cutaneous venous stasis ulcer    Acute GI bleeding    Results from last 7 days   Lab Units 07/02/24  1600 07/02/24  0512 07/01/24  1423 07/01/24  1250 06/30/24  0828 06/29/24  0745   SODIUM mmol/L  --  138  --  136 134* 138   POTASSIUM mmol/L  --  3.9  --  3.9 3.7 3.6   CHLORIDE mmol/L  --  105  --  103 101 105   CO2 mmol/L  --  18.0*  --  17.0* 18.0* 18.0*   BUN mg/dL  --  54*  --  55* 57* 55*   CREATININE mg/dL  --  5.19*  --  5.24* 5.39* 5.10*   CALCIUM mg/dL  --  7.8*  --  7.8* 8.6 8.8   ALBUMIN g/dL  --   --   --  3.1*  --   --    WBC 10*3/mm3  --  10.21 12.24*  --  13.93* 14.59*   HEMOGLOBIN g/dL 8.2* 7.0* 7.2*  --  7.3* 7.4*   PLATELETS 10*3/mm3  --  109* 115*  --  114* 126*       Intake/Output Summary (Last 24 hours) at 7/2/2024 2116  Last data filed at 7/2/2024 1449  Gross per 24 hour   Intake 861.25 ml   Output --   Net 861.25 ml       Assessment and plan:         1.  Acute kidney injury: Last known stable cr ~ 1.4mg/dl. Cr 6.7 mg/dl on admission. UA large alison esterase+ trace protein blood moderate ( Not a clean catch) UACR - 80.9. Non oliguric - DDx - Pre-quinten/ATN vs AIN - improving. ANCA serology negative except for borderline + atypical ANCA ( non specific).     2.  Hyperkalemia: In the setting of WALTER:      3.  Met acidosis: Due to WALTER    4.  Severe anemia: FOBT+. On AC. S/p EGD and colonoscopy " 6/28/24.      5.  DANIEL: Likely due to severe anemia     6.  Hx of breast cancer: On anastrozole     7.  Supra-therapeutic INR: On dabigatran. INR improving.     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 ( Supra-therapeutic INR on pardaxa)  Continue to monitor renal function. If remain stable will continue outpatient lab monitoring for 1-2 weeks before considering renal bx.   Conservative care otherwise.       Reji Deutsch MD  07/02/24  21:16 EDT
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
## 2024-07-03 NOTE — CASE MANAGEMENT/SOCIAL WORK
Continued Stay Note  [facility]     Patient Name: Mono Lerma  MRN: [id number]  Today's Date: 7/3/2024    Admit Date: 6/19/2024    Plan: update   Discharge Plan       Row Name 07/03/24 1116       Plan    Plan Comments Patient's plan is home today 7/3 with family. Family will transport. No discharge needs identified. Patient and family are agreeable to discharge plan.    Final Discharge Disposition Code 01 - home or self-care                   Discharge Codes    No documentation.                 Expected Discharge Date and Time       Expected Discharge Date Expected Discharge Time    Jul 3, 2024               Afia Roy RN

## 2024-07-04 LAB — BACTERIA SPEC AEROBE CULT: NORMAL

## 2024-07-05 ENCOUNTER — NURSE TRIAGE (OUTPATIENT)
Dept: CALL CENTER | Facility: HOSPITAL | Age: 69
End: 2024-07-05
Payer: COMMERCIAL

## 2024-07-05 ENCOUNTER — TRANSITIONAL CARE MANAGEMENT TELEPHONE ENCOUNTER (OUTPATIENT)
Dept: CALL CENTER | Facility: HOSPITAL | Age: 69
End: 2024-07-05
Payer: COMMERCIAL

## 2024-07-05 ENCOUNTER — TELEPHONE (OUTPATIENT)
Dept: FAMILY MEDICINE CLINIC | Facility: CLINIC | Age: 69
End: 2024-07-05

## 2024-07-05 NOTE — OUTREACH NOTE
Call Center TCM Note      Flowsheet Row Responses   [facility], [place], operated by [facility] patient discharged from? [place]   Does the patient have one of the following disease processes/diagnoses(primary or secondary)? Other   TCM attempt successful? Yes   Call start time 1448   Call end time 1502   Discharge diagnosis Renal failure, acute blood loss anemia, chronic DVT   Is patient permission given to speak with other caregiver? Yes   Person spoke with today (if not patient) and relationship DaughterZeus reviewed with patient/caregiver? Yes   Does the patient have all medications ordered at discharge? Yes   Is the patient taking all medications as directed (includes completed medication regime)? Yes   Medication comments Daughter would like for PCP to answer if patient has to continue to take the eliquis twice a day to be therapeutic for DVT prevention as she is having some bleeding from thigh wound. Daughter states patient has surface skin wound on back of thigh from scooting in chair.   Comments PCP Carmen NICHOLSON. Hospital follow up scheduled for 7/9/24  2pm with PCP. Per hospital notes, will need repeat labs (CBC, CMP, INR) with PCP follow up and also clearance for travel.   Does the patient have an appointment with their PCP within 7-14 days of discharge? Yes   Has home health visited the patient within 72 hours of discharge? N/A   Psychosocial issues? No   Did the patient receive a copy of their discharge instructions? Yes   Nursing interventions Reviewed instructions with patient  [daughter]   What is the patient's perception of their health status since discharge? Same   Is the patient/caregiver able to teach back signs and symptoms related to disease process for when to call PCP? Yes   Is the patient/caregiver able to teach back signs and symptoms related to disease process for when to call 911? Yes   Is the patient/caregiver able to teach back the hierarchy of who to call/visit for symptoms/problems? PCP,  Specialist, Home health nurse, Urgent Care, ED, 911 Yes   If the patient is a current smoker, are they able to teach back resources for cessation? Not a smoker   TCM call completed? Yes   Call end time 1502   Would this patient benefit from a Referral to [facility] Social Work? No   Is the patient interested in additional calls from an ambulatory ? No            Era Agosto RN    7/5/2024, 15:09 EDT

## 2024-07-05 NOTE — TELEPHONE ENCOUNTER
Caller/daughter calling regarding Eliquis prescribed post hospital stay -2024 for GI bleed and cellulitis LLE. Prior to hospital stay was on Pradaxa for PMH of DVT.    Eliquis is ordered (and patient is taking):  apixaban (ELIQUIS) 2.5 MG tablet tablet [[id number]]    Order Details  Dose: 2.5 mg Route: Oral Frequency: Every 12 Hours Scheduled   Dispense Quantity: 60 tablet Refills: 0 Fills remainin   Indications of Use: history of DVT/PE         Sig: Take 1 tablet by mouth Every 12 (Twelve) Hours. Indications: history of DVT/PE         Start Date: 24 End Date: --   Written Date: 24 Expiration Date: 25     Caller states she is changing bandage to LLE 2 times daily and for past 2 days the wound will bleed when dressing removed. Does stop with pressure applied and does not bleed between dressing changes.    Caller is questioning whether should only take Eliquis once a day.    Call to PCP office, spoke with Michelle. Will have provider review note and call back to patient/caller.     Caller notified and verbalized understanding.  Reason for Disposition   [1] Caller has URGENT medicine question about med that PCP or specialist prescribed AND [2] triager unable to answer question    Additional Information   Negative: [1] Intentional drug overdose AND [2] suicidal thoughts or ideas   Negative: Drug overdose and triager unable to answer question   Negative: Caller requesting a renewal or refill of a medicine patient is currently taking   Negative: Caller requesting information unrelated to medicine   Negative: Caller requesting information about COVID-19 Vaccine   Negative: Caller requesting information about Emergency Contraception   Negative: Caller requesting information about Combined Birth Control Pills   Negative: Caller requesting information about Progestin Birth Control Pills   Negative: Caller requesting information about Post-Op pain or medicines   Negative: Caller requesting a  "prescription antibiotic (such as Penicillin) for Strep throat and has a positive culture result   Negative: Caller requesting a prescription anti-viral med (such as Tamiflu) and has influenza (flu) symptoms   Negative: Immunization reaction suspected   Negative: Rash while taking a medicine or within 3 days of stopping it   Negative: [1] Asthma and [2] having symptoms of asthma (cough, wheezing, etc.)   Negative: [1] Symptom of illness (e.g., headache, abdominal pain, earache, vomiting) AND [2] more than mild   Negative: Breastfeeding questions about mother's medicines and diet   Negative: MORE THAN A DOUBLE DOSE of a prescription or over-the-counter (OTC) drug   Negative: [1] DOUBLE DOSE (an extra dose or lesser amount) of prescription drug AND [2] any symptoms (e.g., dizziness, nausea, pain, sleepiness)   Negative: [1] DOUBLE DOSE (an extra dose or lesser amount) of over-the-counter (OTC) drug AND [2] any symptoms (e.g., dizziness, nausea, pain, sleepiness)   Negative: Took another person's prescription drug   Negative: [1] DOUBLE DOSE (an extra dose or lesser amount) of prescription drug AND [2] NO symptoms  (Exception: A double dose of antibiotics.)   Negative: Diabetes drug error or overdose (e.g., took wrong type of insulin or took extra dose)   Negative: [1] Prescription not at pharmacy AND [2] was prescribed by PCP recently (Exception: Triager has access to EMR and prescription is recorded there. Go to Home Care and confirm for pharmacy.)   Negative: [1] Pharmacy calling with prescription question AND [2] triager unable to answer question    Answer Assessment - Initial Assessment Questions  1. NAME of MEDICINE: \"What medicine(s) are you calling about?\"      Eliquis  2. QUESTION: \"What is your question?\" (e.g., double dose of medicine, side effect)      Questioning whether should decrease dosing due to leg wound bleeding when dressing changed.  3. PRESCRIBER: \"Who prescribed the medicine?\" Reason: if " "prescribed by specialist, call should be referred to that group.      Hospitalist  4. SYMPTOMS: \"Do you have any symptoms?\" If Yes, ask: \"What symptoms are you having?\"  \"How bad are the symptoms (e.g., mild, moderate, severe)      No bleeding currently  5. PREGNANCY:  \"Is there any chance that you are pregnant?\" \"When was your last menstrual period?\"      N/A    Protocols used: Medication Question Call-ADULT-    "

## 2024-07-05 NOTE — PROGRESS NOTES
Please see nurse triage encounter. Eliquis questions was answered. Awaiting a call back from daughter.

## 2024-07-05 NOTE — TELEPHONE ENCOUNTER
apixaban (ELIQUIS) 2.5 MG tablet tablet [[id number]]    Order Details  Dose: 2.5 mg Route: Oral Frequency: Every 12 Hours Scheduled   Dispense Quantity: 60 tablet Refills: 0 Fills remainin   Indications of Use: history of DVT/PE         Sig: Take 1 tablet by mouth Every 12 (Twelve) Hours. Indications: history of DVT/PE         Start Date: 24 End Date: --   Written Date: 24 Expiration Date: 25   Providers      Ordering Provider and Authorizing Provider:  Mali Carrillo MD  [facility] [address]  Phone: [phone]   Fax: [phone]  NPI: [id number]        Ordering User: Mali Carrillo MD    This order replaced the pradaxa.   Reason for Disposition  • Caller has medicine question, adult has minor symptoms, caller declines triage, AND triager answers question    Additional Information  • Negative: [1] Intentional drug overdose AND [2] suicidal thoughts or ideas  • Negative: Drug overdose and triager unable to answer question  • Negative: Caller requesting a renewal or refill of a medicine patient is currently taking  • Negative: Caller requesting information unrelated to medicine  • Negative: Caller requesting information about COVID-19 Vaccine  • Negative: Caller requesting information about Emergency Contraception  • Negative: Caller requesting information about Combined Birth Control Pills  • Negative: Caller requesting information about Progestin Birth Control Pills  • Negative: Caller requesting information about Post-Op pain or medicines  • Negative: Caller requesting a prescription antibiotic (such as Penicillin) for Strep throat and has a positive culture result  • Negative: Caller requesting a prescription anti-viral med (such as Tamiflu) and has influenza (flu) symptoms  • Negative: Immunization reaction suspected  • Negative: Rash while taking a medicine or within 3 days of stopping it  • Negative: [1] Asthma and [2] having symptoms of asthma (cough, wheezing,  etc.)  • Negative: [1] Symptom of illness (e.g., headache, abdominal pain, earache, vomiting) AND [2] more than mild  • Negative: Breastfeeding questions about mother's medicines and diet  • Negative: MORE THAN A DOUBLE DOSE of a prescription or over-the-counter (OTC) drug  • Negative: [1] DOUBLE DOSE (an extra dose or lesser amount) of prescription drug AND [2] any symptoms (e.g., dizziness, nausea, pain, sleepiness)  • Negative: [1] DOUBLE DOSE (an extra dose or lesser amount) of over-the-counter (OTC) drug AND [2] any symptoms (e.g., dizziness, nausea, pain, sleepiness)  • Negative: Took another person's prescription drug  • Negative: [1] DOUBLE DOSE (an extra dose or lesser amount) of prescription drug AND [2] NO symptoms  (Exception: A double dose of antibiotics.)  • Negative: Diabetes drug error or overdose (e.g., took wrong type of insulin or took extra dose)  • Negative: [1] Prescription not at pharmacy AND [2] was prescribed by PCP recently (Exception: Triager has access to EMR and prescription is recorded there. Go to Home Care and confirm for pharmacy.)  • Negative: [1] Pharmacy calling with prescription question AND [2] triager unable to answer question  • Negative: [1] Caller has URGENT medicine question about med that PCP or specialist prescribed AND [2] triager unable to answer question  • Negative: Medicine patch causing local rash or itching  • Negative: [1] Caller has medicine question about med NOT prescribed by PCP AND [2] triager unable to answer question (e.g., compatibility with other med, storage)  • Negative: Prescription request for new medicine (not a refill)  • Negative: [1] Caller has NON-URGENT medicine question about med that PCP prescribed AND [2] triager unable to answer question  • Negative: Caller wants to use a complementary or alternative medicine  • Negative: [1] Prescription prescribed recently is not at pharmacy AND [2] triager has access to patient's EMR AND [3] prescription  "is recorded in the EMR  • Negative: [1] DOUBLE DOSE (an extra dose or lesser amount) of over-the-counter (OTC) drug AND [2] NO symptoms  • Negative: [1] DOUBLE DOSE (an extra dose or lesser amount) of antibiotic drug AND [2] NO symptoms  • Negative: Caller has medicine question only, adult not sick, AND triager answers question  • Negative: Caller requesting information about medicine during pregnancy; adult is not ill AND triager answers question    Answer Assessment - Initial Assessment Questions  1. NAME of MEDICINE: \"What medicine(s) are you calling about?\"      Apixaban 2.5mg take one bid for DVT/PE  2. QUESTION: \"What is your question?\" (e.g., double dose of medicine, side effect)      Take it once or twice daily  3. PRESCRIBER: \"Who prescribed the medicine?\" Reason: if prescribed by specialist, call should be referred to that group.      Mali Carrillo MD  4. SYMPTOMS: \"Do you have any symptoms?\" If Yes, ask: \"What symptoms are you having?\"  \"How bad are the symptoms (e.g., mild, moderate, severe)      DVT/PE  5. PREGNANCY:  \"Is there any chance that you are pregnant?\" \"When was your last menstrual period?\"      na    Protocols used: Medication Question Call-ADULT-    "

## 2024-07-05 NOTE — TELEPHONE ENCOUNTER
Unable to reach [facility] by phone or leave message.    Hub may relay message and document.    Zoey Swanson, APRN27 minutes ago (2:19 PM)       Eliquis was prescribed to replace Pradaxa. Patient was in kidney failure, based on discharge it was thought that this caused the levels of Pradaxa to be too high and resulted in the GI bleed. Eliquis is the safer option for her DVT prevention

## 2024-07-05 NOTE — TELEPHONE ENCOUNTER
Eliquis was prescribed to replace Pradaxa. Patient was in kidney failure, based on discharge it was thought that this caused the levels of Pradaxa to be too high and resulted in the GI bleed. Eliquis is the safer option for her DVT prevention

## 2024-07-08 NOTE — TELEPHONE ENCOUNTER
Patient daughter informed of recommendations made by PCP. No further questions or concerns at this time.

## 2024-07-08 NOTE — TELEPHONE ENCOUNTER
Patient is on lowest dose of Eliquis.  Appears she was prescribed 2.5 mg every 12 hours.  There would be no need to adjust medication.  We do not monitor INR with this medication.  If she has a wound that is bleeding and they can not get the bleeding to stop, then I recommend ER.  If the wound is stable currently, then we will evaluate it tomorrow at appointment.

## 2024-07-08 NOTE — TELEPHONE ENCOUNTER
Patient daughter stated that the question asked was not her question to begin with. They know to continue with eliquis. The concern they had was that the patient has a wound that will not stop bleeding even when they tried to dress it. It kept bleeding quite a lot. The daughters question is about the eliquis dose and if it needs to be adjusted or what they should do. I asked her if the bleeding has gotten better since the call was originally taken. She stated that its not bleeding however they've also not changed the bandages since then to keep it from bleeding again.   She was wondering if the dose should be changed in the meantime compare to the patients INR. They do have a follow up tomorrow.

## 2024-07-10 ENCOUNTER — OFFICE VISIT (OUTPATIENT)
Dept: FAMILY MEDICINE CLINIC | Facility: CLINIC | Age: 69
End: 2024-07-10
Payer: COMMERCIAL

## 2024-07-10 VITALS
HEART RATE: 89 BPM | SYSTOLIC BLOOD PRESSURE: 130 MMHG | DIASTOLIC BLOOD PRESSURE: 86 MMHG | BODY MASS INDEX: 56.93 KG/M2 | HEIGHT: 60 IN | OXYGEN SATURATION: 99 % | WEIGHT: 290 LBS

## 2024-07-10 DIAGNOSIS — R79.1 ELEVATED INR: ICD-10-CM

## 2024-07-10 DIAGNOSIS — I83.009 CHRONIC CUTANEOUS VENOUS STASIS ULCER: ICD-10-CM

## 2024-07-10 DIAGNOSIS — S81.801S WOUND OF RIGHT LOWER EXTREMITY, SEQUELA: ICD-10-CM

## 2024-07-10 DIAGNOSIS — L97.909 CHRONIC CUTANEOUS VENOUS STASIS ULCER: ICD-10-CM

## 2024-07-10 DIAGNOSIS — N19 RENAL FAILURE, UNSPECIFIED CHRONICITY: ICD-10-CM

## 2024-07-10 DIAGNOSIS — I82.5Y2 CHRONIC DEEP VEIN THROMBOSIS (DVT) OF PROXIMAL VEIN OF LEFT LOWER EXTREMITY: ICD-10-CM

## 2024-07-10 DIAGNOSIS — D62 ACUTE BLOOD LOSS ANEMIA: ICD-10-CM

## 2024-07-10 DIAGNOSIS — Z09 HOSPITAL DISCHARGE FOLLOW-UP: Primary | ICD-10-CM

## 2024-07-10 PROCEDURE — 99495 TRANSJ CARE MGMT MOD F2F 14D: CPT | Performed by: PHYSICIAN ASSISTANT

## 2024-07-10 NOTE — PROGRESS NOTES
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 - 145 mmol/L    Potassium 4.0 3.5 - 5.2 mmol/L    Chloride 106 98 - 107 mmol/L    CO2 24.0 22.0 - 29.0 mmol/L    Calcium 9.0 8.6 - 10.5 mg/dL    BUN/Creatinine Ratio 12.1 7.0 - 25.0    Anion Gap 10.0 5.0 - 15.0 mmol/L    eGFR 8.3 (L) >60.0 mL/min/1.73   CBC (No Diff)    Specimen: Blood   Result Value Ref Range    WBC 5.83 3.40 - 10.80 10*3/mm3    RBC 2.53 (L) 3.77 - 5.28 10*6/mm3    Hemoglobin 7.6 (L) 12.0 - 15.9 g/dL    Hematocrit 23.9 (L) 34.0 - 46.6 %    MCV 94.5 79.0 - 97.0 fL    MCH 30.0 26.6 - 33.0 pg    MCHC 31.8 31.5 - 35.7 g/dL    RDW 15.9 (H) 12.3 - 15.4 %    RDW-SD 54.8 (H) 37.0 - 54.0 fl    MPV 10.9 6.0 - 12.0 fL    Platelets 129 (L) 140 - 450 10*3/mm3   Protime-INR    Specimen: Blood   Result Value Ref Range    Protime 19.3 (H) 12.2 - 14.5 Seconds    INR 1.61 (H) 0.89 - 1.12   CBC (No Diff)    Specimen: Blood   Result Value Ref Range    WBC 5.90 3.40 - 10.80 10*3/mm3    RBC 2.60 (L) 3.77 - 5.28 10*6/mm3    Hemoglobin 7.7 (L) 12.0 - 15.9 g/dL    Hematocrit 24.2 (L) 34.0 - 46.6 %    MCV 93.1 79.0 - 97.0 fL    MCH 29.6 26.6 - 33.0 pg    MCHC 31.8 31.5 - 35.7 g/dL    RDW 15.5 (H) 12.3 - 15.4 %    RDW-SD 53.6 37.0 - 54.0 fl    MPV 11.3 6.0 - 12.0 fL    Platelets 146 140 - 450 10*3/mm3   Basic Metabolic Panel    Specimen: Blood   Result Value Ref Range    Glucose 105 (H) 65 - 99 mg/dL    BUN 61 (H) 8 - 23 mg/dL     Creatinine 5.12 (H) 0.57 - 1.00 mg/dL    Sodium 139 136 - 145 mmol/L    Potassium 3.7 3.5 - 5.2 mmol/L    Chloride 104 98 - 107 mmol/L    CO2 20.0 (L) 22.0 - 29.0 mmol/L    Calcium 9.1 8.6 - 10.5 mg/dL    BUN/Creatinine Ratio 11.9 7.0 - 25.0    Anion Gap 15.0 5.0 - 15.0 mmol/L    eGFR 8.7 (L) >60.0 mL/min/1.73   Vitamin B12    Specimen: Blood   Result Value Ref Range    Vitamin B-12 922 211 - 946 pg/mL   Folate    Specimen: Blood   Result Value Ref Range    Folate 4.76 (L) 4.78 - 24.20 ng/mL   Iron Profile    Specimen: Blood   Result Value Ref Range    Iron 67 37 - 145 mcg/dL    Iron Saturation (TSAT) 28 20 - 50 %    Transferrin 162 (L) 200 - 360 mg/dL    TIBC 241 (L) 298 - 536 mcg/dL   Reticulocytes    Specimen: Blood   Result Value Ref Range    Reticulocyte % 1.44 0.70 - 1.90 %    Reticulocyte Absolute 0.0374 0.0200 - 0.1300 10*6/mm3   Protime-INR    Specimen: Blood   Result Value Ref Range    Protime 18.4 (H) 12.2 - 14.5 Seconds    INR 1.52 (H) 0.89 - 1.12   CBC (No Diff)    Specimen: Blood   Result Value Ref Range    WBC 14.59 (H) 3.40 - 10.80 10*3/mm3    RBC 2.46 (L) 3.77 - 5.28 10*6/mm3    Hemoglobin 7.4 (L) 12.0 - 15.9 g/dL    Hematocrit 22.7 (L) 34.0 - 46.6 %    MCV 92.3 79.0 - 97.0 fL    MCH 30.1 26.6 - 33.0 pg    MCHC 32.6 31.5 - 35.7 g/dL    RDW 15.9 (H) 12.3 - 15.4 %    RDW-SD 53.5 37.0 - 54.0 fl    MPV 11.7 6.0 - 12.0 fL    Platelets 126 (L) 140 - 450 10*3/mm3   Basic Metabolic Panel    Specimen: Blood   Result Value Ref Range    Glucose 111 (H) 65 - 99 mg/dL    BUN 55 (H) 8 - 23 mg/dL    Creatinine 5.10 (H) 0.57 - 1.00 mg/dL    Sodium 138 136 - 145 mmol/L    Potassium 3.6 3.5 - 5.2 mmol/L    Chloride 105 98 - 107 mmol/L    CO2 18.0 (L) 22.0 - 29.0 mmol/L    Calcium 8.8 8.6 - 10.5 mg/dL    BUN/Creatinine Ratio 10.8 7.0 - 25.0    Anion Gap 15.0 5.0 - 15.0 mmol/L    eGFR 8.8 (L) >60.0 mL/min/1.73   Procalcitonin    Specimen: Blood   Result Value Ref Range    Procalcitonin 0.43 (H) 0.00 - 0.25 ng/mL    Urinalysis With Culture If Indicated - Urine, Clean Catch    Specimen: Urine, Clean Catch   Result Value Ref Range    Color, UA Orange (A) Yellow, Straw    Appearance, UA Turbid (A) Clear    pH, UA 6.0 5.0 - 8.0    Specific Gravity, UA 1.012 1.001 - 1.030    Glucose,  mg/dL (1+) (A) Negative    Ketones, UA Negative Negative    Bilirubin, UA Negative Negative    Blood, UA Large (3+) (A) Negative    Protein,  mg/dL (2+) (A) Negative    Leuk Esterase, UA Large (3+) (A) Negative    Nitrite, UA Positive (A) Negative    Urobilinogen, UA 0.2 E.U./dL 0.2 - 1.0 E.U./dL   Urinalysis, Microscopic Only - Urine, Clean Catch    Specimen: Urine, Clean Catch   Result Value Ref Range    RBC, UA Too Numerous to Count (A) None Seen, 0-2 /HPF    WBC, UA Too Numerous to Count (A) None Seen, 0-2 /HPF    Bacteria, UA 4+ (A) None Seen, Trace /HPF    Squamous Epithelial Cells, UA 0-2 None Seen, 0-2 /HPF    Hyaline Casts, UA 0-6 0 - 6 /LPF    Methodology Automated Microscopy    Protime-INR    Specimen: Blood   Result Value Ref Range    Protime 19.9 (H) 12.2 - 14.5 Seconds    INR 1.67 (H) 0.89 - 1.12   Basic Metabolic Panel    Specimen: Blood   Result Value Ref Range    Glucose 99 65 - 99 mg/dL    BUN 57 (H) 8 - 23 mg/dL    Creatinine 5.39 (H) 0.57 - 1.00 mg/dL    Sodium 134 (L) 136 - 145 mmol/L    Potassium 3.7 3.5 - 5.2 mmol/L    Chloride 101 98 - 107 mmol/L    CO2 18.0 (L) 22.0 - 29.0 mmol/L    Calcium 8.6 8.6 - 10.5 mg/dL    BUN/Creatinine Ratio 10.6 7.0 - 25.0    Anion Gap 15.0 5.0 - 15.0 mmol/L    eGFR 8.2 (L) >60.0 mL/min/1.73   Procalcitonin    Specimen: Blood   Result Value Ref Range    Procalcitonin 2.61 (H) 0.00 - 0.25 ng/mL   CBC Auto Differential    Specimen: Blood   Result Value Ref Range    WBC 13.93 (H) 3.40 - 10.80 10*3/mm3    RBC 2.46 (L) 3.77 - 5.28 10*6/mm3    Hemoglobin 7.3 (L) 12.0 - 15.9 g/dL    Hematocrit 23.3 (L) 34.0 - 46.6 %    MCV 94.7 79.0 - 97.0 fL    MCH 29.7 26.6 - 33.0 pg    MCHC 31.3 (L) 31.5 -  35.7 g/dL    RDW 16.1 (H) 12.3 - 15.4 %    RDW-SD 55.9 (H) 37.0 - 54.0 fl    MPV 12.6 (H) 6.0 - 12.0 fL    Platelets 114 (L) 140 - 450 10*3/mm3   Manual Differential    Specimen: Blood   Result Value Ref Range    Neutrophil % 61.0 42.7 - 76.0 %    Lymphocyte % 8.0 (L) 19.6 - 45.3 %    Monocyte % 7.0 5.0 - 12.0 %    Eosinophil % 0.0 (L) 0.3 - 6.2 %    Basophil % 0.0 0.0 - 1.5 %    Bands %  24.0 (H) 0.0 - 5.0 %    Neutrophils Absolute 11.84 (H) 1.70 - 7.00 10*3/mm3    Lymphocytes Absolute 1.11 0.70 - 3.10 10*3/mm3    Monocytes Absolute 0.98 (H) 0.10 - 0.90 10*3/mm3    Eosinophils Absolute 0.00 0.00 - 0.40 10*3/mm3    Basophils Absolute 0.00 0.00 - 0.20 10*3/mm3    RBC Morphology Normal Normal    WBC Morphology Normal Normal    Platelet Morphology Normal Normal   CK    Specimen: Blood   Result Value Ref Range    Creatine Kinase 1,233 (H) 20 - 180 U/L   Protime-INR    Specimen: Blood   Result Value Ref Range    Protime 17.5 (H) 12.2 - 14.5 Seconds    INR 1.43 (H) 0.89 - 1.12   CBC (No Diff)    Specimen: Blood   Result Value Ref Range    WBC 12.24 (H) 3.40 - 10.80 10*3/mm3    RBC 2.40 (L) 3.77 - 5.28 10*6/mm3    Hemoglobin 7.2 (L) 12.0 - 15.9 g/dL    Hematocrit 22.9 (L) 34.0 - 46.6 %    MCV 95.4 79.0 - 97.0 fL    MCH 30.0 26.6 - 33.0 pg    MCHC 31.4 (L) 31.5 - 35.7 g/dL    RDW 16.0 (H) 12.3 - 15.4 %    RDW-SD 56.8 (H) 37.0 - 54.0 fl    MPV 12.3 (H) 6.0 - 12.0 fL    Platelets 115 (L) 140 - 450 10*3/mm3   Basic Metabolic Panel    Specimen: Blood   Result Value Ref Range    Glucose 160 (H) 65 - 99 mg/dL    BUN 55 (H) 8 - 23 mg/dL    Creatinine 5.24 (H) 0.57 - 1.00 mg/dL    Sodium 136 136 - 145 mmol/L    Potassium 3.9 3.5 - 5.2 mmol/L    Chloride 103 98 - 107 mmol/L    CO2 17.0 (L) 22.0 - 29.0 mmol/L    Calcium 7.8 (L) 8.6 - 10.5 mg/dL    BUN/Creatinine Ratio 10.5 7.0 - 25.0    Anion Gap 16.0 (H) 5.0 - 15.0 mmol/L    eGFR 8.5 (L) >60.0 mL/min/1.73   Hepatic Function Panel    Specimen: Blood   Result Value Ref Range    Total  Protein 6.3 6.0 - 8.5 g/dL    Albumin 3.1 (L) 3.5 - 5.2 g/dL    ALT (SGPT) 17 1 - 33 U/L    AST (SGOT) 46 (H) 1 - 32 U/L    Alkaline Phosphatase 54 39 - 117 U/L    Total Bilirubin 0.4 0.0 - 1.2 mg/dL    Bilirubin, Direct 0.2 0.0 - 0.3 mg/dL    Bilirubin, Indirect 0.2 mg/dL   C-reactive Protein    Specimen: Blood   Result Value Ref Range    C-Reactive Protein 15.58 (H) 0.00 - 0.50 mg/dL   Protime-INR    Specimen: Blood   Result Value Ref Range    Protime 17.5 (H) 12.2 - 14.5 Seconds    INR 1.43 (H) 0.89 - 1.12   CBC (No Diff)    Specimen: Blood   Result Value Ref Range    WBC 10.21 3.40 - 10.80 10*3/mm3    RBC 2.36 (L) 3.77 - 5.28 10*6/mm3    Hemoglobin 7.0 (L) 12.0 - 15.9 g/dL    Hematocrit 21.5 (L) 34.0 - 46.6 %    MCV 91.1 79.0 - 97.0 fL    MCH 29.7 26.6 - 33.0 pg    MCHC 32.6 31.5 - 35.7 g/dL    RDW 16.3 (H) 12.3 - 15.4 %    RDW-SD 54.5 (H) 37.0 - 54.0 fl    MPV 12.9 (H) 6.0 - 12.0 fL    Platelets 109 (L) 140 - 450 10*3/mm3   Basic Metabolic Panel    Specimen: Blood   Result Value Ref Range    Glucose 96 65 - 99 mg/dL    BUN 54 (H) 8 - 23 mg/dL    Creatinine 5.19 (H) 0.57 - 1.00 mg/dL    Sodium 138 136 - 145 mmol/L    Potassium 3.9 3.5 - 5.2 mmol/L    Chloride 105 98 - 107 mmol/L    CO2 18.0 (L) 22.0 - 29.0 mmol/L    Calcium 7.8 (L) 8.6 - 10.5 mg/dL    BUN/Creatinine Ratio 10.4 7.0 - 25.0    Anion Gap 15.0 5.0 - 15.0 mmol/L    eGFR 8.6 (L) >60.0 mL/min/1.73   Hemoglobin & Hematocrit, Blood    Specimen: Arm, Left; Blood   Result Value Ref Range    Hemoglobin 8.2 (L) 12.0 - 15.9 g/dL    Hematocrit 25.6 (L) 34.0 - 46.6 %   Protime-INR    Specimen: Blood   Result Value Ref Range    Protime 18.7 (H) 12.2 - 14.5 Seconds    INR 1.55 (H) 0.89 - 1.12   CBC (No Diff)    Specimen: Blood   Result Value Ref Range    WBC 10.07 3.40 - 10.80 10*3/mm3    RBC 2.64 (L) 3.77 - 5.28 10*6/mm3    Hemoglobin 7.9 (L) 12.0 - 15.9 g/dL    Hematocrit 24.5 (L) 34.0 - 46.6 %    MCV 92.8 79.0 - 97.0 fL    MCH 29.9 26.6 - 33.0 pg    MCHC 32.2  31.5 - 35.7 g/dL    RDW 16.9 (H) 12.3 - 15.4 %    RDW-SD 57.4 (H) 37.0 - 54.0 fl    MPV 12.2 (H) 6.0 - 12.0 fL    Platelets 131 (L) 140 - 450 10*3/mm3   Basic Metabolic Panel    Specimen: Blood   Result Value Ref Range    Glucose 103 (H) 65 - 99 mg/dL    BUN 47 (H) 8 - 23 mg/dL    Creatinine 5.12 (H) 0.57 - 1.00 mg/dL    Sodium 134 (L) 136 - 145 mmol/L    Potassium 3.6 3.5 - 5.2 mmol/L    Chloride 104 98 - 107 mmol/L    CO2 17.0 (L) 22.0 - 29.0 mmol/L    Calcium 7.6 (L) 8.6 - 10.5 mg/dL    BUN/Creatinine Ratio 9.2 7.0 - 25.0    Anion Gap 13.0 5.0 - 15.0 mmol/L    eGFR 8.7 (L) >60.0 mL/min/1.73   POC Occult Blood Stool    Specimen: Per Rectum; Stool   Result Value Ref Range    Fecal Occult Blood Positive (A)     Lot Number [id number]     Expiration Date 10-26     DEVELOPER LOT NUMBER [id number]     DEVELOPER EXPIRATION DATE 2,027-2     Positive Control Positive     Negative Control Negative    POC Glucose Once    Specimen: Blood   Result Value Ref Range    Glucose 117 70 - 130 mg/dL   POC Glucose Once    Specimen: Blood   Result Value Ref Range    Glucose 188 (H) 70 - 130 mg/dL   POC Glucose Once    Specimen: Blood   Result Value Ref Range    Glucose 128 70 - 130 mg/dL   POC Glucose Once    Specimen: Blood   Result Value Ref Range    Glucose 116 70 - 130 mg/dL   POC Glucose Once    Specimen: Blood   Result Value Ref Range    Glucose 113 70 - 130 mg/dL   ECG 12 Lead ED Triage Standing Order; SOA   Result Value Ref Range    QT Interval 372 ms    QTC Interval 424 ms   Adult Transthoracic Echo Complete W/ Cont if Necessary Per Protocol   Result Value Ref Range    LVIDd 4.6 cm    LVIDs 3.3 cm    IVSd 0.90 cm    LVPWd 0.90 cm    FS 28.3 %    IVS/LVPW 1.00 cm    ESV(cubed) 35.9 ml    EDV(cubed) 97.3 ml    LV mass(C)d 137.7 grams    LVOT area 2.8 cm2    LVOT diam 1.90 cm    MV E max gurvinder 90.7 cm/sec    MV A max gurvinder 113.0 cm/sec    MV dec time 0.16 sec    MV E/A 0.80     Med Peak E' Gurvinder 11.8 cm/sec    Lat Peak E' Gurvinder 11.8 cm/sec     TR max ella 160.0 cm/sec    Avg E/e' ratio 7.69     SV(LVOT) 57.0 ml    RV Base 3.2 cm    RV Mid 2.6 cm    RV Length 6.4 cm    TAPSE (>1.6) 2.38 cm    RV S' 26.1 cm/sec    LA dimension (2D)  3.4 cm    LV V1 max 126.0 cm/sec    LV V1 max PG 6.4 mmHg    LV V1 mean PG 3.0 mmHg    LV V1 VTI 20.1 cm    Ao pk ella 124.7 cm/sec    Ao max PG 6.2 mmHg    Ao mean PG 3.7 mmHg    Ao V2 VTI 22.8 cm    EMMA(I,D) 2.5 cm2    MV max PG 3.1 mmHg    MV mean PG 2.00 mmHg    MV V2 VTI 19.1 cm    MV P1/2t 19.6 msec    MVA(P1/2t) 11.2 cm2    MVA(VTI) 3.0 cm2    MV dec slope 1,288 cm/sec2    TR max PG 10.2 mmHg    PA V2 max 123.5 cm/sec    PA acc time 0.07 sec    Ao root diam 3.3 cm    LA ESV Index (BP) 9.5 ml/m2    RVSP(TR) 18 mmHg    RAP systole 8 mmHg    Ascending aorta 3.2 cm    BH CV VAS BP LEFT /74 mmHg   Type & Screen    Specimen: Arm, Left; Blood   Result Value Ref Range    ABO Type O     RH type Positive     Antibody Screen Negative     T&S Expiration Date 6/22/2024 11:59:59 PM    Prepare RBC, 2 Units   Result Value Ref Range    Product Code H5494Q92     Unit Number [id number]     UNIT  ABO O     UNIT  RH POS     Crossmatch Interpretation Compatible     Dispense Status PT     Blood Expiration Date 202407162359     Blood Type Barcode 5100     Product Code O0981D69     Unit Number [id number]     UNIT  ABO O     UNIT  RH POS     Crossmatch Interpretation Compatible     Dispense Status PT     Blood Expiration Date 202407162359     Blood Type Barcode 5100    ABO RH Specimen Verification    Specimen: Blood   Result Value Ref Range    ABO Type O     RH type Positive    Prepare RBC, 2 Units   Result Value Ref Range    Product Code A6501P10     Unit Number [id number]     UNIT  ABO O     UNIT  RH POS     Crossmatch Interpretation Compatible     Dispense Status PT     Blood Expiration Date 202407072359     Blood Type Barcode 5100     Product Code Y2447B46     Unit Number [id number]     UNIT  ABO O     UNIT  RH NEG      Crossmatch Interpretation Compatible     Dispense Status RE     Blood Expiration Date 649793161713     Blood Type Barcode 9500    Prepare RBC, 2 Units   Result Value Ref Range    Product Code I4682I91     Unit Number [id number]     UNIT  ABO O     UNIT  RH POS     Crossmatch Interpretation Compatible     Dispense Status PT     Blood Expiration Date 391953913392     Blood Type Barcode 5100    Type & Screen    Specimen: Blood   Result Value Ref Range    ABO Type O     RH type Positive     Antibody Screen Negative     T&S Expiration Date 6/27/2024 11:59:59 PM    Prepare RBC, 1 Units   Result Value Ref Range    Product Code W2451D01     Unit Number [id number]     UNIT  ABO O     UNIT  RH POS     Crossmatch Interpretation Compatible     Dispense Status PT     Blood Expiration Date 814949171252     Blood Type Barcode 5100    Type & Screen    Specimen: Blood   Result Value Ref Range    ABO Type O     RH type Positive     Antibody Screen Negative     T&S Expiration Date 7/1/2024 11:59:59 PM    Prepare RBC, 1 Units   Result Value Ref Range    Product Code J2514K63     Unit Number [id number]     UNIT  ABO O     UNIT  RH POS     Crossmatch Interpretation Compatible     Dispense Status PT     Blood Expiration Date 136225812802     Blood Type Barcode 5100    Type & Screen    Specimen: Blood   Result Value Ref Range    ABO Type O     RH type Positive     Antibody Screen Negative     T&S Expiration Date 7/5/2024 11:59:59 PM    Tissue Pathology Exam    Specimen: Gastric, Antrum; Tissue   Result Value Ref Range    Case Report       Surgical Pathology Report                         Case: [id number]                                  Authorizing Provider:  Brunner, Mark I, MD        Collected:           06/28/2024 09:14 AM          Ordering Location:     [facility]   Received:            06/28/2024 12:52 PM                                 ENDO SUITES                                                             "      Pathologist:           Aracelis Frazier V, DO                                                       Specimen:    Gastric, Antrum, antrum bx for path                                                        Clinical Information       Gastritis      Final Diagnosis       Stomach, antrum, biopsy:  Gastric antral type mucosa with moderate chronic inactive gastritis  Immunohistochemical stain for H. pylori is negative (no organisms are identified)  Negative for intestinal metaplasia, dysplasia, or malignancy        Gross Description       1. Gastric, Antrum.  Received in formalin labeled \"antrum BX for path\" is a 0.5 x 0.4 x 0.2 cm single tan tissue fragment, submitted entirely in a single cassette.  LDP        Microscopic Description       The slides are reviewed and demonstrate histopathologic features supporting the above rendered diagnosis.       Green Top (Gel)   Result Value Ref Range    Extra Tube Hold for add-ons.    Lavender Top   Result Value Ref Range    Extra Tube hold for add-on    Gold Top - SST   Result Value Ref Range    Extra Tube Hold for add-ons.    Gray Top   Result Value Ref Range    Extra Tube Hold for add-ons.    Light Blue Top   Result Value Ref Range    Extra Tube Hold for add-ons.    Duplex Venous Lower Extremity - Bilateral CAR   Result Value Ref Range    Right Common Femoral Spont Y     Right Common Femoral Phasic Y     Right Common Femoral Compress C     Right Common Femoral Augment Y     Right Saphenofemoral Junction Compress C     Right Proximal Femoral Compress C     Right Mid Femoral Spont Y     Right Mid Femoral Phasic Y     Right Mid Femoral Compress C     Right Mid Femoral Augment Y     Right Distal Femoral Compress C     Right Popliteal Spont Y     Right Popliteal Phasic Y     Right Popliteal Compress C     Right Popliteal Augment Y     Right Posterior Tibial Compress C     Right Peroneal Compress C     Right Gastronemius Compress C     Right Greater Saph AK Compress C     Right " "Greater Saph BK Compress C     Right Lesser Saph Compress C     Left Common Femoral Spont Y     Left Common Femoral Phasic Y     Left Common Femoral Compress C     Left Common Femoral Augment Y     Left Saphenofemoral Junction Compress C     Left Proximal Femoral Compress C     Left Mid Femoral Spont Y     Left Mid Femoral Phasic Y     Left Mid Femoral Compress C     Left Mid Femoral Augment Y     Left Distal Femoral Compress C     Left Popliteal Spont Y     Left Popliteal Phasic Y     Left Popliteal Compress C     Left Popliteal Augment Y     Left Posterior Tibial Compress C     Left Peroneal Compress C     Left Gastronemius Compress C     Left Greater Saph AK Compress C     Left Greater Saph BK Compress C     Left Lesser Saph Compress C     BH CV LOWER VASCULAR RIGHT ANTERIOR SAPH COMPRESS C        Visit Vitals  /86   Pulse 89   Ht 152.4 cm (60\")   Wt 132 kg (290 lb)   SpO2 99%   BMI 56.64 kg/m²     Body mass index is 56.64 kg/m².    Objective   Physical Exam  Vitals reviewed.   Constitutional:       General: She is not in acute distress.     Appearance: Normal appearance. She is well-developed. She is morbidly obese. She is not ill-appearing or diaphoretic.      Comments: In wheelchair today at exam.  Attempted multiple positions to try and clean/view wound but patient has limited mobility and we were unable to find a position in which we could safely evaluate wound.   HENT:      Head: Normocephalic and atraumatic.   Eyes:      Extraocular Movements: Extraocular movements intact.      Conjunctiva/sclera: Conjunctivae normal.   Pulmonary:      Effort: No respiratory distress.   Musculoskeletal:      Cervical back: Normal range of motion.   Neurological:      General: No focal deficit present.      Mental Status: She is alert.   Psychiatric:         Attention and Perception: She is attentive.         Mood and Affect: Mood normal.         Speech: Speech normal.         Behavior: Behavior normal. Behavior is " cooperative.         Thought Content: Thought content normal.         Judgment: Judgment normal.         Assessment & Plan   Diagnoses and all orders for this visit:    1. Hospital discharge follow-up (Primary)    2. Renal failure, unspecified chronicity  -     Cancel: Comprehensive Metabolic Panel; Future  Reviewed labs from nephrology office.  Seen yesterday by nephrologist.  Creatinine has improved slightly, but remains elevated at 5.49.  Has follow-up with nephrology in 2 weeks for repeat labs.  Will message me on mychart with lab results.  Would defer to nephrology about travel clearance.    3. Acute blood loss anemia  -     Cancel: CBC Auto Differential; Future  Reviewed labs completed by nephrology.  Shows slight improvement with hemoglobin at 8.0, previously 7.9.    4. Elevated INR  -     Protime-INR; Future  Recommend obtaining PT/INR at next appointment with nephrology given abnormal value at admission of hospitalization.  Lab order provided to patient and family.    5. Chronic cutaneous venous stasis ulcer  6. Wound of right lower extremity, sequela  Unable to safely get patient in a position in which I was able to view wound and change bandage.  Patient usually lies on the bed at home and daughter is able to access.  Daughter will change when she gets home today and call if she has any concerns.  Gave her supplies and explained how to care for wound.  Discussed signs of infection.  If she is unable to manage wound or if they prefer, I would recommend home health wound care.  They will call if they want a referral placed.    7. Chronic deep vein thrombosis (DVT) of proximal vein of left lower extremity  -     apixaban (ELIQUIS) 2.5 MG tablet tablet; Take 1 tablet by mouth Every 12 (Twelve) Hours. Indications: history of DVT/PE  Dispense: 180 tablet; Refill: 0  Has 30 day supply.  Plans on obtaining from provider in [place].  Gave 30 day hardcopy prescription.             Dictated Utilizing Dragon  Dictation     Please note that portions of this note were completed with a voice recognition program.     Part of this note may be an electronic transcription/translation of spoken language to printed text using the Dragon Dictation System.

## 2024-07-11 ENCOUNTER — PATIENT MESSAGE (OUTPATIENT)
Dept: FAMILY MEDICINE CLINIC | Facility: CLINIC | Age: 69
End: 2024-07-11

## 2024-07-11 ENCOUNTER — TELEPHONE (OUTPATIENT)
Dept: FAMILY MEDICINE CLINIC | Facility: CLINIC | Age: 69
End: 2024-07-11
Payer: COMMERCIAL

## 2024-07-11 NOTE — TELEPHONE ENCOUNTER
Spoke with patient's daughter today.  She was able to passively change the wound on her mother's upper right anterior thigh.  She states there was no bleeding and it appears that the skin is healed over.  She did not feel there was any signs of infection.  She did take a picture and will message me with this later today.

## 2024-07-26 ENCOUNTER — TRANSCRIBE ORDERS (OUTPATIENT)
Dept: LAB | Facility: HOSPITAL | Age: 69
End: 2024-07-26
Payer: COMMERCIAL

## 2024-07-26 ENCOUNTER — LAB (OUTPATIENT)
Dept: LAB | Facility: HOSPITAL | Age: 69
End: 2024-07-26
Payer: COMMERCIAL

## 2024-07-26 DIAGNOSIS — N17.9 ACUTE RENAL FAILURE, UNSPECIFIED ACUTE RENAL FAILURE TYPE: Primary | ICD-10-CM

## 2024-07-26 DIAGNOSIS — R79.1 ELEVATED INR: ICD-10-CM

## 2024-07-26 DIAGNOSIS — N17.9 ACUTE RENAL FAILURE, UNSPECIFIED ACUTE RENAL FAILURE TYPE: ICD-10-CM

## 2024-07-26 DIAGNOSIS — K92.1 BLACK STOOLS: ICD-10-CM

## 2024-07-26 DIAGNOSIS — R06.02 SHORTNESS OF BREATH: ICD-10-CM

## 2024-07-26 LAB
ALBUMIN SERPL-MCNC: 3.4 G/DL (ref 3.5–5.2)
ALBUMIN/GLOB SERPL: 0.8 G/DL
ALP SERPL-CCNC: 55 U/L (ref 39–117)
ALT SERPL W P-5'-P-CCNC: 17 U/L (ref 1–33)
ANION GAP SERPL CALCULATED.3IONS-SCNC: 13.8 MMOL/L (ref 5–15)
AST SERPL-CCNC: 24 U/L (ref 1–32)
BACTERIA UR QL AUTO: ABNORMAL /HPF
BASOPHILS # BLD AUTO: 0.03 10*3/MM3 (ref 0–0.2)
BASOPHILS NFR BLD AUTO: 0.4 % (ref 0–1.5)
BILIRUB SERPL-MCNC: 0.3 MG/DL (ref 0–1.2)
BILIRUB UR QL STRIP: NEGATIVE
BUN SERPL-MCNC: 27 MG/DL (ref 8–23)
BUN/CREAT SERPL: 8.1 (ref 7–25)
CALCIUM SPEC-SCNC: 9 MG/DL (ref 8.6–10.5)
CHLORIDE SERPL-SCNC: 107 MMOL/L (ref 98–107)
CLARITY UR: ABNORMAL
CO2 SERPL-SCNC: 18.2 MMOL/L (ref 22–29)
COLOR UR: YELLOW
CREAT SERPL-MCNC: 3.33 MG/DL (ref 0.57–1)
DEPRECATED RDW RBC AUTO: 60 FL (ref 37–54)
EGFRCR SERPLBLD CKD-EPI 2021: 14.5 ML/MIN/1.73
EOSINOPHIL # BLD AUTO: 0.27 10*3/MM3 (ref 0–0.4)
EOSINOPHIL NFR BLD AUTO: 3.7 % (ref 0.3–6.2)
ERYTHROCYTE [DISTWIDTH] IN BLOOD BY AUTOMATED COUNT: 17.1 % (ref 12.3–15.4)
GLOBULIN UR ELPH-MCNC: 4.5 GM/DL
GLUCOSE SERPL-MCNC: 125 MG/DL (ref 65–99)
GLUCOSE UR STRIP-MCNC: NEGATIVE MG/DL
HCT VFR BLD AUTO: 25.3 % (ref 34–46.6)
HGB BLD-MCNC: 7.9 G/DL (ref 12–15.9)
HGB UR QL STRIP.AUTO: ABNORMAL
HYALINE CASTS UR QL AUTO: ABNORMAL /LPF
IMM GRANULOCYTES # BLD AUTO: 0.02 10*3/MM3 (ref 0–0.05)
IMM GRANULOCYTES NFR BLD AUTO: 0.3 % (ref 0–0.5)
INR PPP: 1.3 (ref 0.89–1.12)
KETONES UR QL STRIP: NEGATIVE
LEUKOCYTE ESTERASE UR QL STRIP.AUTO: ABNORMAL
LYMPHOCYTES # BLD AUTO: 1.79 10*3/MM3 (ref 0.7–3.1)
LYMPHOCYTES NFR BLD AUTO: 24.4 % (ref 19.6–45.3)
MCH RBC QN AUTO: 30 PG (ref 26.6–33)
MCHC RBC AUTO-ENTMCNC: 31.2 G/DL (ref 31.5–35.7)
MCV RBC AUTO: 96.2 FL (ref 79–97)
MONOCYTES # BLD AUTO: 0.63 10*3/MM3 (ref 0.1–0.9)
MONOCYTES NFR BLD AUTO: 8.6 % (ref 5–12)
NEUTROPHILS NFR BLD AUTO: 4.6 10*3/MM3 (ref 1.7–7)
NEUTROPHILS NFR BLD AUTO: 62.6 % (ref 42.7–76)
NITRITE UR QL STRIP: POSITIVE
NRBC BLD AUTO-RTO: 0 /100 WBC (ref 0–0.2)
PH UR STRIP.AUTO: 6 [PH] (ref 5–8)
PHOSPHATE SERPL-MCNC: 3.7 MG/DL (ref 2.5–4.5)
PLATELET # BLD AUTO: 168 10*3/MM3 (ref 140–450)
PMV BLD AUTO: 11.2 FL (ref 6–12)
POTASSIUM SERPL-SCNC: 4.2 MMOL/L (ref 3.5–5.2)
PROT ?TM UR-MCNC: 20.6 MG/DL
PROT SERPL-MCNC: 7.9 G/DL (ref 6–8.5)
PROT UR QL STRIP: ABNORMAL
PROTHROMBIN TIME: 16.4 SECONDS (ref 12.2–14.5)
RBC # BLD AUTO: 2.63 10*6/MM3 (ref 3.77–5.28)
RBC # UR STRIP: ABNORMAL /HPF
REF LAB TEST METHOD: ABNORMAL
SODIUM SERPL-SCNC: 139 MMOL/L (ref 136–145)
SP GR UR STRIP: 1.01 (ref 1–1.03)
SQUAMOUS #/AREA URNS HPF: ABNORMAL /HPF
UROBILINOGEN UR QL STRIP: ABNORMAL
WBC # UR STRIP: ABNORMAL /HPF
WBC NRBC COR # BLD AUTO: 7.34 10*3/MM3 (ref 3.4–10.8)

## 2024-07-26 PROCEDURE — 85610 PROTHROMBIN TIME: CPT

## 2024-07-26 PROCEDURE — 36415 COLL VENOUS BLD VENIPUNCTURE: CPT

## 2024-07-26 PROCEDURE — 81001 URINALYSIS AUTO W/SCOPE: CPT

## 2024-07-26 PROCEDURE — 80053 COMPREHEN METABOLIC PANEL: CPT

## 2024-07-26 PROCEDURE — 84156 ASSAY OF PROTEIN URINE: CPT

## 2024-07-26 PROCEDURE — 84100 ASSAY OF PHOSPHORUS: CPT

## 2024-07-26 PROCEDURE — 85025 COMPLETE CBC W/AUTO DIFF WBC: CPT

## 2024-07-30 ENCOUNTER — PATIENT MESSAGE (OUTPATIENT)
Dept: FAMILY MEDICINE CLINIC | Facility: CLINIC | Age: 69
End: 2024-07-30
Payer: COMMERCIAL

## 2024-07-30 DIAGNOSIS — I82.5Y2 CHRONIC DEEP VEIN THROMBOSIS (DVT) OF PROXIMAL VEIN OF LEFT LOWER EXTREMITY: ICD-10-CM

## 2024-08-01 DIAGNOSIS — I82.5Y2 CHRONIC DEEP VEIN THROMBOSIS (DVT) OF PROXIMAL VEIN OF LEFT LOWER EXTREMITY: ICD-10-CM

## 2024-08-01 NOTE — TELEPHONE ENCOUNTER
Caller: SHOLA ONTIVEROS    Relationship: Emergency Contact    Best call back number: [phone]     What is the best time to reach you: ASAP THIS MORNING    Who are you requesting to speak with (clinical staff, provider,  specific staff member): PCP/MA    Do you know the name of the person who called: SHOLA    What was the call regarding: PLEASE READ BELOW AND ALSO THE Cels[facility] MESSAGE FROM TODAY. PATIENT WILL BE OUT OF MEDICATION TODAY AND DAUGHTER NEEDS A CALLBACK ASAP THIS MORNING    Is it okay if the provider responds through MyChart: CALLBACK TODAY ASAP      
Is patient requesting this refill?  They previously wanted a printed prescription to send to [place].  Please call and discuss with daughter.  
Rx Refill Note  Requested Prescriptions     Pending Prescriptions Disp Refills    apixaban (ELIQUIS) 2.5 MG tablet tablet 180 tablet 0     Sig: Take 1 tablet by mouth Every 12 (Twelve) Hours. Indications: history of DVT/PE      Last office visit with prescribing clinician: 7/10/2024   Last telemedicine visit with prescribing clinician: Visit date not found   Next office visit with prescribing clinician: Visit date not found                         Would you like a call back once the refill request has been completed: [] Yes [] No    If the office needs to give you a call back, can they leave a voicemail: [] Yes [] No    Rosana Stratton MA  07/31/24, 10:05 EDT  
Leon Tierney
Admission

## 2024-08-02 ENCOUNTER — TELEPHONE (OUTPATIENT)
Dept: FAMILY MEDICINE CLINIC | Facility: CLINIC | Age: 69
End: 2024-08-02
Payer: COMMERCIAL

## 2024-08-02 DIAGNOSIS — I82.5Y2 CHRONIC DEEP VEIN THROMBOSIS (DVT) OF PROXIMAL VEIN OF LEFT LOWER EXTREMITY: ICD-10-CM

## 2024-08-02 NOTE — TELEPHONE ENCOUNTER
I spoke with Tiffanie Singh. She did not feel comfortable with the patient breaking 5mg samples in half. She suggested that patient call her Nephrologist to see if they had 2.5mg samples. If they do not, she has already sent a prescription for a 30 day supply of Eliquis 2.5mg into [facility].    I called the patient's daughter to advise her to call the Nephrologist to see if they have 2.5mg samples of Eliquis. She understands an will do that, and If they do not, she will just  the prescription from [facility] for the 30 day supply today.

## 2024-08-02 NOTE — TELEPHONE ENCOUNTER
It is not okay to use Eliquis 5 mg daily instead of the 2.5 mg every 12 hours.      If we have Eliquis 2.5 mg samples, okay to give to patient.  Do not substitute with 5 mg tablets.    I will send in prescription to [facility] as well for 30 day supply of medicine.

## 2024-08-02 NOTE — TELEPHONE ENCOUNTER
The patient could not use the [place] Card for Eliquis because it can only be used once, and it was used when she was an inpatient at [facility].  Wants to know if we have any samples here to get her through until going back to [facility] at the end of August 30-31, 2024.    Eliquis 2.5 mg. x (2) daily. She said she could use 5mg and take once daily if we have 5mg samples instead.    Would this be okay?    Been trying to get this figured out, so she took her last dose this morning. Please respond asap.     One month supply of Eliquis would cost approx $1,000.00 because she does not have prescription insurance.    [facility] closes at 5:30pm today. If we do not have any samples here.  She will figure out how to get the money if prescription sent to [facility]. But really hoping we would have the samples, either 2.5mg or 5mg.    [place] said $2,0000 without insurance. That is why they are requesting [facility] if needed.    Please call to advise.

## 2024-08-02 NOTE — TELEPHONE ENCOUNTER
From: Moon Lerma  To: Carmen Singh  Sent: 7/30/2024 10:46 PM EDT  Subject: Eliquis refill     Atif Singh, we are unable to get eliquis shipped and delivered from [place] at this time. Could this please be refilled here.

## 2024-08-02 NOTE — TELEPHONE ENCOUNTER
Caller: SHOLA ONTIVEROS    Relationship: Emergency Contact    Best call back number: [phone]     What is the best time to reach you: ANY ASAP    What was the call regarding: SHE IS CALLING BACK ASKING FOR A CALL ASAP TO LET HER KNOW. THE PATIENT IS OUT OF MEDICATION. PLEASE CALL ASAP.     PLEASE NOTE SHOLA IS NOT ON [facility].

## 2024-08-09 ENCOUNTER — LAB (OUTPATIENT)
Dept: LAB | Facility: HOSPITAL | Age: 69
End: 2024-08-09
Payer: COMMERCIAL

## 2024-08-09 ENCOUNTER — TRANSCRIBE ORDERS (OUTPATIENT)
Dept: LAB | Facility: HOSPITAL | Age: 69
End: 2024-08-09
Payer: COMMERCIAL

## 2024-08-09 DIAGNOSIS — N17.9 ACUTE RENAL FAILURE, UNSPECIFIED ACUTE RENAL FAILURE TYPE: Primary | ICD-10-CM

## 2024-08-09 DIAGNOSIS — N17.9 ACUTE RENAL FAILURE, UNSPECIFIED ACUTE RENAL FAILURE TYPE: ICD-10-CM

## 2024-08-09 LAB
ALBUMIN SERPL-MCNC: 3.5 G/DL (ref 3.5–5.2)
ANION GAP SERPL CALCULATED.3IONS-SCNC: 12 MMOL/L (ref 5–15)
BACTERIA UR QL AUTO: ABNORMAL /HPF
BASOPHILS # BLD AUTO: 0.03 10*3/MM3 (ref 0–0.2)
BASOPHILS NFR BLD AUTO: 0.4 % (ref 0–1.5)
BILIRUB UR QL STRIP: NEGATIVE
BUN SERPL-MCNC: 30 MG/DL (ref 8–23)
BUN/CREAT SERPL: 10.3 (ref 7–25)
CALCIUM SPEC-SCNC: 9.8 MG/DL (ref 8.6–10.5)
CHLORIDE SERPL-SCNC: 103 MMOL/L (ref 98–107)
CLARITY UR: CLEAR
CO2 SERPL-SCNC: 20 MMOL/L (ref 22–29)
COLOR UR: YELLOW
CREAT SERPL-MCNC: 2.91 MG/DL (ref 0.57–1)
CREAT UR-MCNC: 49.6 MG/DL
DEPRECATED RDW RBC AUTO: 59.3 FL (ref 37–54)
EGFRCR SERPLBLD CKD-EPI 2021: 17.1 ML/MIN/1.73
EOSINOPHIL # BLD AUTO: 0.21 10*3/MM3 (ref 0–0.4)
EOSINOPHIL NFR BLD AUTO: 2.8 % (ref 0.3–6.2)
ERYTHROCYTE [DISTWIDTH] IN BLOOD BY AUTOMATED COUNT: 16.3 % (ref 12.3–15.4)
GLUCOSE SERPL-MCNC: 155 MG/DL (ref 65–99)
GLUCOSE UR STRIP-MCNC: NEGATIVE MG/DL
HCT VFR BLD AUTO: 28.3 % (ref 34–46.6)
HGB BLD-MCNC: 8.7 G/DL (ref 12–15.9)
HGB UR QL STRIP.AUTO: ABNORMAL
HYALINE CASTS UR QL AUTO: ABNORMAL /LPF
IMM GRANULOCYTES # BLD AUTO: 0.04 10*3/MM3 (ref 0–0.05)
IMM GRANULOCYTES NFR BLD AUTO: 0.5 % (ref 0–0.5)
KETONES UR QL STRIP: NEGATIVE
LEUKOCYTE ESTERASE UR QL STRIP.AUTO: ABNORMAL
LYMPHOCYTES # BLD AUTO: 1.54 10*3/MM3 (ref 0.7–3.1)
LYMPHOCYTES NFR BLD AUTO: 20.3 % (ref 19.6–45.3)
MCH RBC QN AUTO: 30.4 PG (ref 26.6–33)
MCHC RBC AUTO-ENTMCNC: 30.7 G/DL (ref 31.5–35.7)
MCV RBC AUTO: 99 FL (ref 79–97)
MONOCYTES # BLD AUTO: 0.59 10*3/MM3 (ref 0.1–0.9)
MONOCYTES NFR BLD AUTO: 7.8 % (ref 5–12)
NEUTROPHILS NFR BLD AUTO: 5.18 10*3/MM3 (ref 1.7–7)
NEUTROPHILS NFR BLD AUTO: 68.2 % (ref 42.7–76)
NITRITE UR QL STRIP: NEGATIVE
NRBC BLD AUTO-RTO: 0 /100 WBC (ref 0–0.2)
PH UR STRIP.AUTO: 5.5 [PH] (ref 5–8)
PHOSPHATE SERPL-MCNC: 4.2 MG/DL (ref 2.5–4.5)
PLATELET # BLD AUTO: 129 10*3/MM3 (ref 140–450)
PMV BLD AUTO: 12 FL (ref 6–12)
POTASSIUM SERPL-SCNC: 4.7 MMOL/L (ref 3.5–5.2)
PROT ?TM UR-MCNC: 16.5 MG/DL
PROT UR QL STRIP: ABNORMAL
RBC # BLD AUTO: 2.86 10*6/MM3 (ref 3.77–5.28)
RBC # UR STRIP: ABNORMAL /HPF
REF LAB TEST METHOD: ABNORMAL
SODIUM SERPL-SCNC: 135 MMOL/L (ref 136–145)
SP GR UR STRIP: 1.01 (ref 1–1.03)
SQUAMOUS #/AREA URNS HPF: ABNORMAL /HPF
UROBILINOGEN UR QL STRIP: ABNORMAL
WBC # UR STRIP: ABNORMAL /HPF
WBC NRBC COR # BLD AUTO: 7.59 10*3/MM3 (ref 3.4–10.8)

## 2024-08-09 PROCEDURE — 36415 COLL VENOUS BLD VENIPUNCTURE: CPT

## 2024-08-09 PROCEDURE — 84156 ASSAY OF PROTEIN URINE: CPT

## 2024-08-09 PROCEDURE — 80069 RENAL FUNCTION PANEL: CPT

## 2024-08-09 PROCEDURE — 81001 URINALYSIS AUTO W/SCOPE: CPT

## 2024-08-09 PROCEDURE — 82570 ASSAY OF URINE CREATININE: CPT

## 2024-08-09 PROCEDURE — 85025 COMPLETE CBC W/AUTO DIFF WBC: CPT

## (undated) DEVICE — SAFELINER SUCTION CANISTER 1000CC: Brand: DEROYAL

## (undated) DEVICE — ST LINER SAFECAP GRN RED CP STRL

## (undated) DEVICE — ADAPT CLN LUM OLYMP AIR/H20

## (undated) DEVICE — SOLIDIFIER LIQ PREMISORB 1500CC

## (undated) DEVICE — TUBING, SUCTION, 1/4" X 10', STRAIGHT: Brand: MEDLINE

## (undated) DEVICE — SOL IRR H2O BTL 1000ML STRL

## (undated) DEVICE — CONTN GRAD MEAS TRIANG 32OZ BLK

## (undated) DEVICE — FIRST STEP BEDSIDE ADD WATER KIT - RESEALABLE STAND-UP POUCH, ENDOSCOPIC CLEANING PAD - 1 POUCH: Brand: FIRST STEP BEDSIDE ADD WATER KIT - RESEALABLE STAND-UP POUCH, ENDOSCOPIC CLEANIN

## (undated) DEVICE — KT ORCA ORCAPOD DISP STRL

## (undated) DEVICE — LUBE JELLY FOIL PACK 1.4 OZ: Brand: MEDLINE INDUSTRIES, INC.

## (undated) DEVICE — THE BITE BLOCK MAXI, LATEX FREE STRAP IS USED TO PROTECT THE ENDOSCOPE INSERTION TUBE FROM BEING BITTEN BY THE PATIENT.

## (undated) DEVICE — GRADUATE CONTN 1000ML

## (undated) DEVICE — INTRO ACCSR BLNT TP

## (undated) DEVICE — SYR LUERLOK 50ML

## (undated) DEVICE — HYBRID CO2 TUBING/CAP SET FOR OLYMPUS® SCOPES & CO2 SOURCE: Brand: ERBE